# Patient Record
Sex: FEMALE | Race: WHITE | NOT HISPANIC OR LATINO | Employment: OTHER | URBAN - METROPOLITAN AREA
[De-identification: names, ages, dates, MRNs, and addresses within clinical notes are randomized per-mention and may not be internally consistent; named-entity substitution may affect disease eponyms.]

---

## 2017-06-14 ENCOUNTER — CONVERSION ENCOUNTER (OUTPATIENT)
Dept: MAMMOGRAPHY | Facility: HOSPITAL | Age: 72
End: 2017-06-14

## 2017-09-05 ENCOUNTER — CONVERSION ENCOUNTER (OUTPATIENT)
Dept: MAMMOGRAPHY | Facility: HOSPITAL | Age: 72
End: 2017-09-05

## 2018-01-10 ENCOUNTER — OFFICE VISIT CONVERTED (OUTPATIENT)
Dept: ONCOLOGY | Facility: HOSPITAL | Age: 73
End: 2018-01-10
Attending: INTERNAL MEDICINE

## 2018-03-05 ENCOUNTER — OFFICE VISIT CONVERTED (OUTPATIENT)
Dept: SURGERY | Facility: CLINIC | Age: 73
End: 2018-03-05
Attending: SURGERY

## 2018-03-16 ENCOUNTER — TELEPHONE CONVERTED (OUTPATIENT)
Dept: ONCOLOGY | Facility: HOSPITAL | Age: 73
End: 2018-03-16

## 2018-04-10 ENCOUNTER — OFFICE VISIT CONVERTED (OUTPATIENT)
Dept: ONCOLOGY | Facility: HOSPITAL | Age: 73
End: 2018-04-10
Attending: NURSE PRACTITIONER

## 2018-05-23 ENCOUNTER — OFFICE VISIT CONVERTED (OUTPATIENT)
Dept: SURGERY | Facility: CLINIC | Age: 73
End: 2018-05-23
Attending: SURGERY

## 2018-07-10 ENCOUNTER — OFFICE VISIT CONVERTED (OUTPATIENT)
Dept: ONCOLOGY | Facility: HOSPITAL | Age: 73
End: 2018-07-10
Attending: NURSE PRACTITIONER

## 2018-08-17 ENCOUNTER — OFFICE VISIT CONVERTED (OUTPATIENT)
Dept: GASTROENTEROLOGY | Facility: CLINIC | Age: 73
End: 2018-08-17
Attending: PHYSICIAN ASSISTANT

## 2018-09-07 ENCOUNTER — CONVERSION ENCOUNTER (OUTPATIENT)
Dept: OTHER | Facility: HOSPITAL | Age: 73
End: 2018-09-07

## 2018-10-22 ENCOUNTER — OFFICE VISIT CONVERTED (OUTPATIENT)
Dept: SURGERY | Facility: CLINIC | Age: 73
End: 2018-10-22
Attending: SURGERY

## 2018-10-30 ENCOUNTER — OFFICE VISIT CONVERTED (OUTPATIENT)
Dept: ONCOLOGY | Facility: HOSPITAL | Age: 73
End: 2018-10-30
Attending: INTERNAL MEDICINE

## 2018-11-28 ENCOUNTER — OFFICE VISIT CONVERTED (OUTPATIENT)
Dept: SURGERY | Facility: CLINIC | Age: 73
End: 2018-11-28
Attending: SURGERY

## 2019-01-22 ENCOUNTER — OFFICE VISIT CONVERTED (OUTPATIENT)
Dept: ONCOLOGY | Facility: HOSPITAL | Age: 74
End: 2019-01-22
Attending: INTERNAL MEDICINE

## 2019-01-22 ENCOUNTER — HOSPITAL ENCOUNTER (OUTPATIENT)
Dept: ONCOLOGY | Facility: HOSPITAL | Age: 74
Discharge: HOME | End: 2019-01-22
Attending: INTERNAL MEDICINE

## 2019-02-25 ENCOUNTER — OFFICE VISIT CONVERTED (OUTPATIENT)
Dept: ONCOLOGY | Facility: HOSPITAL | Age: 74
End: 2019-02-25
Attending: INTERNAL MEDICINE

## 2019-02-25 ENCOUNTER — HOSPITAL ENCOUNTER (OUTPATIENT)
Dept: ONCOLOGY | Facility: HOSPITAL | Age: 74
Discharge: HOME | End: 2019-02-25
Attending: INTERNAL MEDICINE

## 2019-04-04 ENCOUNTER — TRANSCRIBE ORDERS (OUTPATIENT)
Dept: ADMINISTRATIVE | Facility: HOSPITAL | Age: 74
End: 2019-04-04

## 2019-04-04 ENCOUNTER — HOSPITAL ENCOUNTER (OUTPATIENT)
Dept: CT IMAGING | Facility: HOSPITAL | Age: 74
Discharge: HOME | End: 2019-04-04
Admitting: ORTHOPAEDIC SURGERY

## 2019-04-04 DIAGNOSIS — S82.892A CLOSED LEFT ANKLE FRACTURE, INITIAL ENCOUNTER: Primary | ICD-10-CM

## 2019-04-04 DIAGNOSIS — S82.892A CLOSED LEFT ANKLE FRACTURE, INITIAL ENCOUNTER: ICD-10-CM

## 2019-04-04 PROCEDURE — 73700 CT LOWER EXTREMITY W/O DYE: CPT

## 2019-04-05 RX ORDER — LORATADINE 10 MG/1
10 CAPSULE, LIQUID FILLED ORAL DAILY
COMMUNITY

## 2019-04-05 RX ORDER — SIMVASTATIN 20 MG
20 TABLET ORAL EVERY MORNING
COMMUNITY
End: 2022-04-05

## 2019-04-05 RX ORDER — OMEPRAZOLE 20 MG/1
20 CAPSULE, DELAYED RELEASE ORAL DAILY
COMMUNITY

## 2019-04-05 RX ORDER — OXYBUTYNIN CHLORIDE 5 MG/1
5 TABLET ORAL 3 TIMES DAILY PRN
COMMUNITY

## 2019-04-05 RX ORDER — HYDROCHLOROTHIAZIDE 12.5 MG/1
12.5 TABLET ORAL DAILY
COMMUNITY

## 2019-04-05 RX ORDER — MELATONIN
1000 DAILY
COMMUNITY

## 2019-04-05 RX ORDER — METOPROLOL TARTRATE 50 MG/1
50 TABLET, FILM COATED ORAL EVERY MORNING
COMMUNITY
End: 2022-04-05

## 2019-04-05 RX ORDER — PERPHENAZINE 16 MG
1200 TABLET ORAL NIGHTLY
COMMUNITY

## 2019-04-05 RX ORDER — PHENOL 1.4 %
10 AEROSOL, SPRAY (ML) MUCOUS MEMBRANE NIGHTLY
COMMUNITY

## 2019-04-05 RX ORDER — ACETAMINOPHEN 500 MG
500 TABLET ORAL EVERY 6 HOURS PRN
COMMUNITY

## 2019-04-05 RX ORDER — ANASTROZOLE 1 MG/1
1 TABLET ORAL DAILY
COMMUNITY
End: 2022-04-05

## 2019-04-08 ENCOUNTER — APPOINTMENT (OUTPATIENT)
Dept: GENERAL RADIOLOGY | Facility: HOSPITAL | Age: 74
End: 2019-04-08

## 2019-04-08 ENCOUNTER — ANESTHESIA (OUTPATIENT)
Dept: PERIOP | Facility: HOSPITAL | Age: 74
End: 2019-04-08

## 2019-04-08 ENCOUNTER — HOSPITAL ENCOUNTER (OUTPATIENT)
Facility: HOSPITAL | Age: 74
Discharge: HOME | End: 2019-04-09
Attending: ORTHOPAEDIC SURGERY | Admitting: ORTHOPAEDIC SURGERY

## 2019-04-08 ENCOUNTER — ANESTHESIA EVENT (OUTPATIENT)
Dept: PERIOP | Facility: HOSPITAL | Age: 74
End: 2019-04-08

## 2019-04-08 DIAGNOSIS — Z74.09 IMPAIRED FUNCTIONAL MOBILITY AND ACTIVITY TOLERANCE: Primary | ICD-10-CM

## 2019-04-08 PROBLEM — S82.872A PILON FRACTURE OF LEFT TIBIA: Status: ACTIVE | Noted: 2019-04-08

## 2019-04-08 LAB
ALBUMIN SERPL-MCNC: 3.9 G/DL (ref 3.5–5.2)
ALBUMIN/GLOB SERPL: 1.3 G/DL
ALP SERPL-CCNC: 85 U/L (ref 39–117)
ALT SERPL W P-5'-P-CCNC: 15 U/L (ref 1–33)
ANION GAP SERPL CALCULATED.3IONS-SCNC: 17.1 MMOL/L
AST SERPL-CCNC: 19 U/L (ref 1–32)
BILIRUB SERPL-MCNC: 1 MG/DL (ref 0.2–1.2)
BUN BLD-MCNC: 17 MG/DL (ref 8–23)
BUN/CREAT SERPL: 15 (ref 7–25)
CALCIUM SPEC-SCNC: 10.1 MG/DL (ref 8.6–10.5)
CHLORIDE SERPL-SCNC: 93 MMOL/L (ref 98–107)
CO2 SERPL-SCNC: 24.9 MMOL/L (ref 22–29)
CREAT BLD-MCNC: 1.13 MG/DL (ref 0.57–1)
DEPRECATED RDW RBC AUTO: 43.5 FL (ref 37–54)
ERYTHROCYTE [DISTWIDTH] IN BLOOD BY AUTOMATED COUNT: 14.1 % (ref 12.3–15.4)
GFR SERPL CREATININE-BSD FRML MDRD: 47 ML/MIN/1.73
GLOBULIN UR ELPH-MCNC: 3.1 GM/DL
GLUCOSE BLD-MCNC: 95 MG/DL (ref 65–99)
HCT VFR BLD AUTO: 33.1 % (ref 34–46.6)
HGB BLD-MCNC: 11.4 G/DL (ref 12–15.9)
MCH RBC QN AUTO: 29.8 PG (ref 26.6–33)
MCHC RBC AUTO-ENTMCNC: 34.4 G/DL (ref 31.5–35.7)
MCV RBC AUTO: 86.6 FL (ref 79–97)
PLATELET # BLD AUTO: 291 10*3/MM3 (ref 140–450)
PMV BLD AUTO: 9.5 FL (ref 6–12)
POTASSIUM BLD-SCNC: 3.5 MMOL/L (ref 3.5–5.2)
PROT SERPL-MCNC: 7 G/DL (ref 6–8.5)
RBC # BLD AUTO: 3.82 10*6/MM3 (ref 3.77–5.28)
SODIUM BLD-SCNC: 135 MMOL/L (ref 136–145)
WBC NRBC COR # BLD: 8.34 10*3/MM3 (ref 3.4–10.8)

## 2019-04-08 PROCEDURE — 85027 COMPLETE CBC AUTOMATED: CPT | Performed by: ORTHOPAEDIC SURGERY

## 2019-04-08 PROCEDURE — C1713 ANCHOR/SCREW BN/BN,TIS/BN: HCPCS | Performed by: ORTHOPAEDIC SURGERY

## 2019-04-08 PROCEDURE — 63710000001 HYDROMORPHONE 2 MG TABLET: Performed by: ORTHOPAEDIC SURGERY

## 2019-04-08 PROCEDURE — 25010000002 ONDANSETRON PER 1 MG: Performed by: ANESTHESIOLOGY

## 2019-04-08 PROCEDURE — 93005 ELECTROCARDIOGRAM TRACING: CPT | Performed by: ORTHOPAEDIC SURGERY

## 2019-04-08 PROCEDURE — 80053 COMPREHEN METABOLIC PANEL: CPT | Performed by: ORTHOPAEDIC SURGERY

## 2019-04-08 PROCEDURE — 76000 FLUOROSCOPY <1 HR PHYS/QHP: CPT

## 2019-04-08 PROCEDURE — 25010000003 CEFAZOLIN IN DEXTROSE 2-4 GM/100ML-% SOLUTION: Performed by: ORTHOPAEDIC SURGERY

## 2019-04-08 PROCEDURE — 73610 X-RAY EXAM OF ANKLE: CPT

## 2019-04-08 PROCEDURE — 25010000002 PROPOFOL 10 MG/ML EMULSION: Performed by: ANESTHESIOLOGY

## 2019-04-08 PROCEDURE — 25010000003 MEPIVACAINE PER 10 ML: Performed by: ANESTHESIOLOGY

## 2019-04-08 PROCEDURE — 25010000002 MIDAZOLAM PER 1 MG: Performed by: ANESTHESIOLOGY

## 2019-04-08 PROCEDURE — 25010000002 VANCOMYCIN 1 G RECONSTITUTED SOLUTION: Performed by: ORTHOPAEDIC SURGERY

## 2019-04-08 PROCEDURE — 93010 ELECTROCARDIOGRAM REPORT: CPT | Performed by: INTERNAL MEDICINE

## 2019-04-08 PROCEDURE — 25010000002 ROPIVACAINE PER 1 MG: Performed by: ANESTHESIOLOGY

## 2019-04-08 PROCEDURE — A9270 NON-COVERED ITEM OR SERVICE: HCPCS | Performed by: ORTHOPAEDIC SURGERY

## 2019-04-08 PROCEDURE — 25010000002 FENTANYL CITRATE (PF) 100 MCG/2ML SOLUTION: Performed by: ANESTHESIOLOGY

## 2019-04-08 DEVICE — SCRW CORT S/TAP LP 3.5X28MM: Type: IMPLANTABLE DEVICE | Status: FUNCTIONAL

## 2019-04-08 DEVICE — SCRW LK ST STRDRV 2.7X14MM: Type: IMPLANTABLE DEVICE | Site: ANKLE | Status: FUNCTIONAL

## 2019-04-08 DEVICE — SCRW LK ST STRDRV 2.7X16MM: Type: IMPLANTABLE DEVICE | Site: ANKLE | Status: FUNCTIONAL

## 2019-04-08 DEVICE — SCRW CORT S/TAP 3.5X14MM: Type: IMPLANTABLE DEVICE | Site: ANKLE | Status: FUNCTIONAL

## 2019-04-08 DEVICE — SCRW LK VA/LCP S/TAP STRDRV 3.5X26MM: Type: IMPLANTABLE DEVICE | Status: FUNCTIONAL

## 2019-04-08 DEVICE — SCRW LK S/TAP T8STRDRV 2.7X38MM: Type: IMPLANTABLE DEVICE | Status: FUNCTIONAL

## 2019-04-08 DEVICE — SCRW LK S/TAP T8STRDRV 2.7X34MM: Type: IMPLANTABLE DEVICE | Status: FUNCTIONAL

## 2019-04-08 DEVICE — SCRW LK S/TAP T8STRDRV 2.7X42MM: Type: IMPLANTABLE DEVICE | Status: FUNCTIONAL

## 2019-04-08 DEVICE — SCRW CORT S/TAP 3.5X40MM: Type: IMPLANTABLE DEVICE | Site: ANKLE | Status: FUNCTIONAL

## 2019-04-08 DEVICE — IMPLANTABLE DEVICE: Type: IMPLANTABLE DEVICE | Status: FUNCTIONAL

## 2019-04-08 DEVICE — SCRW CORT S/TAP LP 3.5X26MM: Type: IMPLANTABLE DEVICE | Status: FUNCTIONAL

## 2019-04-08 DEVICE — ALLOGRFT BONE VIVIGEN CELLUAR MATRX FORMABLE 5CC: Type: IMPLANTABLE DEVICE | Status: FUNCTIONAL

## 2019-04-08 DEVICE — PLT FIB LCP L/D 5H 2.7/3.5X99LT: Type: IMPLANTABLE DEVICE | Site: ANKLE | Status: FUNCTIONAL

## 2019-04-08 DEVICE — SCRW LK S/TAP T8STRDRV 2.7X40MM: Type: IMPLANTABLE DEVICE | Status: FUNCTIONAL

## 2019-04-08 DEVICE — SCRW CORT S/TAP 3.5X42MM: Type: IMPLANTABLE DEVICE | Site: ANKLE | Status: FUNCTIONAL

## 2019-04-08 DEVICE — SCRW CORT S/TAP LP 3.5X30MM: Type: IMPLANTABLE DEVICE | Status: FUNCTIONAL

## 2019-04-08 RX ORDER — VANCOMYCIN HYDROCHLORIDE 1 G/20ML
INJECTION, POWDER, LYOPHILIZED, FOR SOLUTION INTRAVENOUS AS NEEDED
Status: DISCONTINUED | OUTPATIENT
Start: 2019-04-08 | End: 2019-04-08 | Stop reason: HOSPADM

## 2019-04-08 RX ORDER — PROPOFOL 10 MG/ML
VIAL (ML) INTRAVENOUS AS NEEDED
Status: DISCONTINUED | OUTPATIENT
Start: 2019-04-08 | End: 2019-04-08 | Stop reason: SURG

## 2019-04-08 RX ORDER — LIDOCAINE HYDROCHLORIDE 10 MG/ML
0.5 INJECTION, SOLUTION EPIDURAL; INFILTRATION; INTRACAUDAL; PERINEURAL ONCE AS NEEDED
Status: DISCONTINUED | OUTPATIENT
Start: 2019-04-08 | End: 2019-04-08 | Stop reason: HOSPADM

## 2019-04-08 RX ORDER — HYDRALAZINE HYDROCHLORIDE 20 MG/ML
5 INJECTION INTRAMUSCULAR; INTRAVENOUS
Status: DISCONTINUED | OUTPATIENT
Start: 2019-04-08 | End: 2019-04-08 | Stop reason: HOSPADM

## 2019-04-08 RX ORDER — PROMETHAZINE HYDROCHLORIDE 25 MG/1
25 SUPPOSITORY RECTAL ONCE AS NEEDED
Status: DISCONTINUED | OUTPATIENT
Start: 2019-04-08 | End: 2019-04-08 | Stop reason: HOSPADM

## 2019-04-08 RX ORDER — FAMOTIDINE 10 MG/ML
20 INJECTION, SOLUTION INTRAVENOUS ONCE
Status: COMPLETED | OUTPATIENT
Start: 2019-04-08 | End: 2019-04-08

## 2019-04-08 RX ORDER — SODIUM CHLORIDE 0.9 % (FLUSH) 0.9 %
3 SYRINGE (ML) INJECTION EVERY 12 HOURS SCHEDULED
Status: DISCONTINUED | OUTPATIENT
Start: 2019-04-08 | End: 2019-04-09 | Stop reason: HOSPADM

## 2019-04-08 RX ORDER — HYDROMORPHONE HYDROCHLORIDE 1 MG/ML
1 INJECTION, SOLUTION INTRAMUSCULAR; INTRAVENOUS; SUBCUTANEOUS EVERY 4 HOURS PRN
Status: DISCONTINUED | OUTPATIENT
Start: 2019-04-08 | End: 2019-04-09 | Stop reason: HOSPADM

## 2019-04-08 RX ORDER — PROMETHAZINE HYDROCHLORIDE 25 MG/ML
12.5 INJECTION, SOLUTION INTRAMUSCULAR; INTRAVENOUS ONCE AS NEEDED
Status: DISCONTINUED | OUTPATIENT
Start: 2019-04-08 | End: 2019-04-08 | Stop reason: HOSPADM

## 2019-04-08 RX ORDER — EPHEDRINE SULFATE 50 MG/ML
5 INJECTION, SOLUTION INTRAVENOUS ONCE AS NEEDED
Status: DISCONTINUED | OUTPATIENT
Start: 2019-04-08 | End: 2019-04-08 | Stop reason: HOSPADM

## 2019-04-08 RX ORDER — HYDROMORPHONE HYDROCHLORIDE 2 MG/1
4 TABLET ORAL EVERY 4 HOURS PRN
Status: DISCONTINUED | OUTPATIENT
Start: 2019-04-08 | End: 2019-04-09 | Stop reason: HOSPADM

## 2019-04-08 RX ORDER — SODIUM CHLORIDE, SODIUM LACTATE, POTASSIUM CHLORIDE, CALCIUM CHLORIDE 600; 310; 30; 20 MG/100ML; MG/100ML; MG/100ML; MG/100ML
9 INJECTION, SOLUTION INTRAVENOUS CONTINUOUS
Status: DISCONTINUED | OUTPATIENT
Start: 2019-04-08 | End: 2019-04-09 | Stop reason: HOSPADM

## 2019-04-08 RX ORDER — DIPHENHYDRAMINE HYDROCHLORIDE 50 MG/ML
12.5 INJECTION INTRAMUSCULAR; INTRAVENOUS
Status: DISCONTINUED | OUTPATIENT
Start: 2019-04-08 | End: 2019-04-08 | Stop reason: HOSPADM

## 2019-04-08 RX ORDER — PROMETHAZINE HYDROCHLORIDE 25 MG/1
25 TABLET ORAL ONCE AS NEEDED
Status: DISCONTINUED | OUTPATIENT
Start: 2019-04-08 | End: 2019-04-08 | Stop reason: HOSPADM

## 2019-04-08 RX ORDER — SENNA AND DOCUSATE SODIUM 50; 8.6 MG/1; MG/1
2 TABLET, FILM COATED ORAL 2 TIMES DAILY PRN
Status: DISCONTINUED | OUTPATIENT
Start: 2019-04-08 | End: 2019-04-09 | Stop reason: HOSPADM

## 2019-04-08 RX ORDER — ANASTROZOLE 1 MG/1
1 TABLET ORAL DAILY
Status: DISCONTINUED | OUTPATIENT
Start: 2019-04-09 | End: 2019-04-09 | Stop reason: HOSPADM

## 2019-04-08 RX ORDER — LIDOCAINE HYDROCHLORIDE 20 MG/ML
INJECTION, SOLUTION INFILTRATION; PERINEURAL AS NEEDED
Status: DISCONTINUED | OUTPATIENT
Start: 2019-04-08 | End: 2019-04-08 | Stop reason: SURG

## 2019-04-08 RX ORDER — MIDAZOLAM HYDROCHLORIDE 1 MG/ML
2 INJECTION INTRAMUSCULAR; INTRAVENOUS
Status: DISCONTINUED | OUTPATIENT
Start: 2019-04-08 | End: 2019-04-08 | Stop reason: HOSPADM

## 2019-04-08 RX ORDER — SODIUM CHLORIDE 0.9 % (FLUSH) 0.9 %
3-10 SYRINGE (ML) INJECTION AS NEEDED
Status: DISCONTINUED | OUTPATIENT
Start: 2019-04-08 | End: 2019-04-09 | Stop reason: HOSPADM

## 2019-04-08 RX ORDER — HYDROCHLOROTHIAZIDE 25 MG/1
12.5 TABLET ORAL DAILY
Status: DISCONTINUED | OUTPATIENT
Start: 2019-04-09 | End: 2019-04-09 | Stop reason: HOSPADM

## 2019-04-08 RX ORDER — ROPIVACAINE HYDROCHLORIDE 5 MG/ML
INJECTION, SOLUTION EPIDURAL; INFILTRATION; PERINEURAL
Status: COMPLETED | OUTPATIENT
Start: 2019-04-08 | End: 2019-04-08

## 2019-04-08 RX ORDER — FENTANYL CITRATE 50 UG/ML
50 INJECTION, SOLUTION INTRAMUSCULAR; INTRAVENOUS
Status: DISCONTINUED | OUTPATIENT
Start: 2019-04-08 | End: 2019-04-08 | Stop reason: HOSPADM

## 2019-04-08 RX ORDER — NALOXONE HCL 0.4 MG/ML
0.2 VIAL (ML) INJECTION AS NEEDED
Status: DISCONTINUED | OUTPATIENT
Start: 2019-04-08 | End: 2019-04-08 | Stop reason: HOSPADM

## 2019-04-08 RX ORDER — OXYBUTYNIN CHLORIDE 5 MG/1
5 TABLET ORAL 3 TIMES DAILY PRN
Status: DISCONTINUED | OUTPATIENT
Start: 2019-04-08 | End: 2019-04-09 | Stop reason: HOSPADM

## 2019-04-08 RX ORDER — FLUMAZENIL 0.1 MG/ML
0.2 INJECTION INTRAVENOUS AS NEEDED
Status: DISCONTINUED | OUTPATIENT
Start: 2019-04-08 | End: 2019-04-08 | Stop reason: HOSPADM

## 2019-04-08 RX ORDER — ROPIVACAINE HYDROCHLORIDE 2 MG/ML
INJECTION, SOLUTION EPIDURAL; INFILTRATION; PERINEURAL
Status: COMPLETED | OUTPATIENT
Start: 2019-04-08 | End: 2019-04-08

## 2019-04-08 RX ORDER — ONDANSETRON 2 MG/ML
4 INJECTION INTRAMUSCULAR; INTRAVENOUS ONCE AS NEEDED
Status: COMPLETED | OUTPATIENT
Start: 2019-04-08 | End: 2019-04-08

## 2019-04-08 RX ORDER — ONDANSETRON 2 MG/ML
INJECTION INTRAMUSCULAR; INTRAVENOUS AS NEEDED
Status: DISCONTINUED | OUTPATIENT
Start: 2019-04-08 | End: 2019-04-08 | Stop reason: SURG

## 2019-04-08 RX ORDER — HYDROCODONE BITARTRATE AND ACETAMINOPHEN 7.5; 325 MG/1; MG/1
1 TABLET ORAL ONCE AS NEEDED
Status: DISCONTINUED | OUTPATIENT
Start: 2019-04-08 | End: 2019-04-08 | Stop reason: HOSPADM

## 2019-04-08 RX ORDER — CEFAZOLIN SODIUM 2 G/100ML
2 INJECTION, SOLUTION INTRAVENOUS ONCE
Status: DISCONTINUED | OUTPATIENT
Start: 2019-04-08 | End: 2019-04-08 | Stop reason: HOSPADM

## 2019-04-08 RX ORDER — CETIRIZINE HYDROCHLORIDE 10 MG/1
10 TABLET ORAL DAILY
Status: DISCONTINUED | OUTPATIENT
Start: 2019-04-09 | End: 2019-04-09 | Stop reason: HOSPADM

## 2019-04-08 RX ORDER — DIPHENHYDRAMINE HCL 25 MG
25 CAPSULE ORAL
Status: DISCONTINUED | OUTPATIENT
Start: 2019-04-08 | End: 2019-04-08 | Stop reason: HOSPADM

## 2019-04-08 RX ORDER — HYDROMORPHONE HYDROCHLORIDE 1 MG/ML
0.5 INJECTION, SOLUTION INTRAMUSCULAR; INTRAVENOUS; SUBCUTANEOUS
Status: DISCONTINUED | OUTPATIENT
Start: 2019-04-08 | End: 2019-04-08 | Stop reason: HOSPADM

## 2019-04-08 RX ORDER — MIDAZOLAM HYDROCHLORIDE 1 MG/ML
1 INJECTION INTRAMUSCULAR; INTRAVENOUS
Status: DISCONTINUED | OUTPATIENT
Start: 2019-04-08 | End: 2019-04-08 | Stop reason: HOSPADM

## 2019-04-08 RX ORDER — CEFAZOLIN SODIUM 2 G/100ML
2 INJECTION, SOLUTION INTRAVENOUS EVERY 8 HOURS
Status: DISCONTINUED | OUTPATIENT
Start: 2019-04-08 | End: 2019-04-09 | Stop reason: HOSPADM

## 2019-04-08 RX ORDER — HYDROMORPHONE HYDROCHLORIDE 2 MG/1
2 TABLET ORAL EVERY 4 HOURS PRN
COMMUNITY
End: 2019-04-09 | Stop reason: HOSPADM

## 2019-04-08 RX ORDER — CHOLECALCIFEROL (VITAMIN D3) 125 MCG
10 CAPSULE ORAL NIGHTLY
Status: DISCONTINUED | OUTPATIENT
Start: 2019-04-08 | End: 2019-04-09 | Stop reason: HOSPADM

## 2019-04-08 RX ORDER — LABETALOL HYDROCHLORIDE 5 MG/ML
5 INJECTION, SOLUTION INTRAVENOUS
Status: DISCONTINUED | OUTPATIENT
Start: 2019-04-08 | End: 2019-04-08 | Stop reason: HOSPADM

## 2019-04-08 RX ORDER — SODIUM CHLORIDE 0.9 % (FLUSH) 0.9 %
1-10 SYRINGE (ML) INJECTION AS NEEDED
Status: DISCONTINUED | OUTPATIENT
Start: 2019-04-08 | End: 2019-04-08 | Stop reason: HOSPADM

## 2019-04-08 RX ORDER — METOPROLOL TARTRATE 50 MG/1
50 TABLET, FILM COATED ORAL EVERY MORNING
Status: DISCONTINUED | OUTPATIENT
Start: 2019-04-09 | End: 2019-04-09 | Stop reason: HOSPADM

## 2019-04-08 RX ORDER — HYDROMORPHONE HYDROCHLORIDE 2 MG/1
2 TABLET ORAL EVERY 4 HOURS PRN
Status: DISCONTINUED | OUTPATIENT
Start: 2019-04-08 | End: 2019-04-09 | Stop reason: HOSPADM

## 2019-04-08 RX ORDER — ACETAMINOPHEN 325 MG/1
650 TABLET ORAL ONCE AS NEEDED
Status: DISCONTINUED | OUTPATIENT
Start: 2019-04-08 | End: 2019-04-08 | Stop reason: HOSPADM

## 2019-04-08 RX ORDER — LOPERAMIDE HYDROCHLORIDE 2 MG/1
4 CAPSULE ORAL 4 TIMES DAILY PRN
Status: DISCONTINUED | OUTPATIENT
Start: 2019-04-08 | End: 2019-04-09 | Stop reason: HOSPADM

## 2019-04-08 RX ORDER — MAGNESIUM HYDROXIDE 1200 MG/15ML
LIQUID ORAL AS NEEDED
Status: DISCONTINUED | OUTPATIENT
Start: 2019-04-08 | End: 2019-04-08 | Stop reason: HOSPADM

## 2019-04-08 RX ORDER — OXYCODONE AND ACETAMINOPHEN 7.5; 325 MG/1; MG/1
1 TABLET ORAL ONCE AS NEEDED
Status: DISCONTINUED | OUTPATIENT
Start: 2019-04-08 | End: 2019-04-08 | Stop reason: HOSPADM

## 2019-04-08 RX ORDER — ONDANSETRON 2 MG/ML
4 INJECTION INTRAMUSCULAR; INTRAVENOUS EVERY 6 HOURS PRN
Status: DISCONTINUED | OUTPATIENT
Start: 2019-04-08 | End: 2019-04-09 | Stop reason: HOSPADM

## 2019-04-08 RX ORDER — NALOXONE HCL 0.4 MG/ML
0.4 VIAL (ML) INJECTION
Status: DISCONTINUED | OUTPATIENT
Start: 2019-04-08 | End: 2019-04-09 | Stop reason: HOSPADM

## 2019-04-08 RX ORDER — PANTOPRAZOLE SODIUM 40 MG/1
40 TABLET, DELAYED RELEASE ORAL EVERY MORNING
Status: DISCONTINUED | OUTPATIENT
Start: 2019-04-09 | End: 2019-04-09 | Stop reason: HOSPADM

## 2019-04-08 RX ADMIN — LIDOCAINE HYDROCHLORIDE 60 MG: 20 INJECTION, SOLUTION INFILTRATION; PERINEURAL at 16:02

## 2019-04-08 RX ADMIN — FENTANYL CITRATE 100 MCG: 50 INJECTION, SOLUTION INTRAMUSCULAR; INTRAVENOUS at 15:11

## 2019-04-08 RX ADMIN — MIDAZOLAM 2 MG: 1 INJECTION INTRAMUSCULAR; INTRAVENOUS at 15:10

## 2019-04-08 RX ADMIN — HYDROMORPHONE HYDROCHLORIDE 2 MG: 2 TABLET ORAL at 23:26

## 2019-04-08 RX ADMIN — ONDANSETRON 4 MG: 2 INJECTION INTRAMUSCULAR; INTRAVENOUS at 18:11

## 2019-04-08 RX ADMIN — SODIUM CHLORIDE, PRESERVATIVE FREE 3 ML: 5 INJECTION INTRAVENOUS at 23:21

## 2019-04-08 RX ADMIN — CEFAZOLIN SODIUM 2 G: 2 INJECTION, SOLUTION INTRAVENOUS at 23:21

## 2019-04-08 RX ADMIN — FAMOTIDINE 20 MG: 10 INJECTION INTRAVENOUS at 14:08

## 2019-04-08 RX ADMIN — MEPIVACAINE HYDROCHLORIDE 10 ML: 15 INJECTION, SOLUTION EPIDURAL; INFILTRATION at 15:10

## 2019-04-08 RX ADMIN — PROPOFOL 200 MG: 10 INJECTION, EMULSION INTRAVENOUS at 16:02

## 2019-04-08 RX ADMIN — ROPIVACAINE HYDROCHLORIDE 20 ML: 2 INJECTION, SOLUTION EPIDURAL; INFILTRATION at 15:10

## 2019-04-08 RX ADMIN — SODIUM CHLORIDE, POTASSIUM CHLORIDE, SODIUM LACTATE AND CALCIUM CHLORIDE 9 ML/HR: 600; 310; 30; 20 INJECTION, SOLUTION INTRAVENOUS at 14:09

## 2019-04-08 RX ADMIN — MIDAZOLAM 1 MG: 1 INJECTION INTRAMUSCULAR; INTRAVENOUS at 14:08

## 2019-04-08 RX ADMIN — ONDANSETRON 4 MG: 2 INJECTION INTRAMUSCULAR; INTRAVENOUS at 20:10

## 2019-04-08 RX ADMIN — ROPIVACAINE HYDROCHLORIDE 25 ML: 5 INJECTION, SOLUTION EPIDURAL; INFILTRATION; PERINEURAL at 15:10

## 2019-04-08 NOTE — ANESTHESIA PROCEDURE NOTES
Peripheral Block    Pre-sedation assessment completed: 4/8/2019 3:10 PM    Patient reassessed immediately prior to procedure    Patient location during procedure: holding area  Start time: 4/8/2019 3:10 PM  Stop time: 4/8/2019 3:25 PM  Reason for block: at surgeon's request and post-op pain management  Performed by  Anesthesiologist: Faustino Curran MD  Preanesthetic Checklist  Completed: patient identified, site marked, surgical consent, pre-op evaluation, timeout performed, IV checked, risks and benefits discussed and monitors and equipment checked  Prep:  Sterile barriers:cap, gloves, gown, mask and sterile barriers  Prep: ChloraPrep  Patient monitoring: blood pressure monitoring, continuous pulse oximetry and EKG  Procedure  Sedation:yes    Guidance:ultrasound guided  ULTRASOUND INTERPRETATION.  Using ultrasound guidance a 21 G gauge needle was placed in close proximity to the femoral and sciatic nerve, at which point, under ultrasound guidance anesthetic was injected in the area of the nerve and spread of the anesthesia was seen on ultrasound in close proximity thereto.  There were no abnormalities seen on ultrasound; a digital image was taken; and the patient tolerated the procedure with no complications. Images:still images obtained    Laterality:left  Block Type:femoral and popliteal  Injection Technique:single-shot  Needle Type:echogenic  Needle Gauge:21 G    Medications Used: ropivacaine (NAROPIN) 0.5 % injection, 25 mL  ropivacaine (NAROPIN) 0.2 % injection, 20 mL  mepivacaine (CARBOCAINE) 1.5 % injection, 10 mL  Post Assessment  Injection Assessment: negative aspiration for heme, no paresthesia on injection and incremental injection  Patient Tolerance:comfortable throughout block  Complications:no

## 2019-04-08 NOTE — ANESTHESIA PROCEDURE NOTES
Airway  Urgency: elective    Airway not difficult    General Information and Staff    Patient location during procedure: OR  Anesthesiologist: Sean Villeda MD    Indications and Patient Condition  Indications for airway management: airway protection    Preoxygenated: yes  Mask difficulty assessment: 1 - vent by mask    Final Airway Details  Final airway type: supraglottic airway      Successful airway: classic  Size 4    Number of attempts at approach: 1

## 2019-04-08 NOTE — ANESTHESIA PREPROCEDURE EVALUATION
Anesthesia Evaluation     Patient summary reviewed and Nursing notes reviewed   NPO Solid Status: > 8 hours  NPO Liquid Status: > 2 hours           Airway   Mallampati: II  TM distance: >3 FB  Neck ROM: full  no difficulty expected  Dental - normal exam     Pulmonary - normal exam   (+) shortness of breath,   Cardiovascular - normal exam    (+) hypertension, hyperlipidemia,       Neuro/Psych  GI/Hepatic/Renal/Endo      Musculoskeletal     Abdominal  - normal exam   Substance History      OB/GYN          Other                        Anesthesia Plan    ASA 3     general and regional   (Popliteal Femerol)  intravenous induction   Anesthetic plan, all risks, benefits, and alternatives have been provided, discussed and informed consent has been obtained with: patient.

## 2019-04-08 NOTE — OP NOTE
Procedure Note    Cindy Rosa  4/8/2019    Pre-op Diagnosis:   1.  Closed displaced left distal tibia pilon fracture, subacute  2.  Closed displaced left fibular fracture, subacute       Post-Op Diagnosis Codes:  Same    Procedure:  1.  Open reduction internal fixation, left distal tibia pilon fracture  2.  Open reduction internal fixation, left distal fibular fracture  3.  Open reduction internal fixation, left ankle syndesmosis    Surgeon(s):  Skinny Dupree Jr., MD    Anesthesia: Regional plus LMA    Estimated Blood Loss: minimal    Specimens:                None       Drains:    None    Complications:   None apparent    Disposition:  Stable to PACU for recovery    Note:  This case was significantly more technically difficult than a standard distal tibia pilon fracture and distal fibular fracture.  Each fracture was over 1 month old in a neuropathic patient with poor neuropathic bone quality.  Both fractures had severe deformity and required an osteotomy to recreate the fracture lines, augmented fixation techniques, bone grafting, and salvage reduction techniques; fixation of each fracture required twice as much operative time, respectively, compared to the time it routinely takes to treat acute distal tibia and acute distal fibular fractures.    Indications for procedure:  The patient is a pleasant 73-year-old female with severe peripheral neuropathy who presented with closed displaced distal tibia and fibular fractures.   At the time of presentation, these fractures were over one month old.  Operative stabilization of her unstable displaced fractures were recommended. The risks/benefits of surgery, including pain, infection, wound healing problems, need for future procedures, mal/nonunion, DVT/PE, cardiac event, and/or death were discussed, and the patient elected to proceed with surgery.    Procedure in detail:  The correct patient was identified in preoperative holding.  All risks and benefits of surgery  were again discussed in detail, and the patient agreed to proceed with surgery.  The operative extremity was confirmed and marked by myself.  Operative consent reviewed and confirmed to be signed by the patient and myself.    At this time, the patient was wheeled to the operative theatre and placed supine on the OR table.  PEG/SCD placed on nonoperative leg. Anesthesia was induced smoothly by our anesthesia colleagues.   Well padded nonsterile tourniquet placed on the upper thigh. The left lower extremity was prepped and draped in standard sterile fashion.  Appropriate presurgical timeout was performed, confirming correct patient, correct extremity, correct procedure, availability of sterile instruments/implants, and the administration of intravenous antibiotics in the form of Ancef within one hour of skin incision.    At this time, the extremity exsanguinated with an Esmarch and the tourniquet insufflated to 250mmHg.    A longitudinal incision was made over the anterior ankle joint.  Dissection carried down to the extensor retinaculum, taking care to avoid and protect any branches of the superficial peroneal nerve.  The extensor retinaculum was opened along the lateral border of the tibialis anterior tendon.  The neurovascular bundle under EHL was identified and protected/retracted for the remainder of the case.  Longitudinal arthrotomy was made over the ankle joint.  Subperiosteal dissection performed exposing the distal tibia.    The metaphyseal fracture line was identified.  Callus was sharply removed using a rongeur and a knife.  There was significant early bone formation at the fracture site.  Using fluoroscopy, an osteotome was used to carefully create an osteotomy, re-creating the fracture line.  Once the fracture line was re-established, I carefully used a ball spike to reduce the impacted valgus malaligment.  This was provisionally pinned in place with 2.0 K wires.     The bone quality was noted to be  exceedingly poor. Given the subacute nature of the fracture, there was some bone loss laterally as the fracture had partially healed in an impacted position.   Depuy Synthes Vivigen cellular allograft was carefully packed in the lateral metadiaphyseal defect.    The multiple fracture lines extending to the plafond were carefully reduced and provisionally pinned.  Anatomic restoration of the plafond/distal tibia articular surface was confirmed by direct inspection and fluoroscopy.      A Synthes 8 hole anterolateral distal tibia plate was selected and provisionally pinned in place.  Ideal placement confirmed on fluoroscopy.  This was then secured distally using 2.7mm locking screws and proximally using a combination of 3.5mm nonlocking cortical screws and locking screws.  The proximal two screws were placed through a small stab incision over the anterolateral leg using a perfect circles technique.    At this time, fluoroscopy confirmed anatomic reduction of the distal tibia mechanical axis as well as the articular surface of the plafond. The wounds were irrigated and closed with 00 Vicryl in the capsule/retinacular layer, 000 Vicryl in the subcutaneous layer, and 000 nylon and staples on the skin.    The distal fibula is palpated at the lateral ankle and a longitudinal incision is made centered over it with a #15 blade.  Careful subcutaneous dissection is carried down to the fibula, taking great care to look for and protect any branches of the superficial peroneal nerve.  A #15 blade is then used to gain appropriate subperiosteal dissection at the identified distal fibula fracture. There was significant callus formation/early bone healing.  The bone quality was again noted to be exceedingly poor.  I used a knife and a rongeur to remove early bone healing/callus.  An osteotome was used to carefully perform an osteotomy, carefully recreating the fracture line.    The fracture remained comminuted and very short.  I  could not gain appropriate length using standard reduction techniques/small reduction clamps.      An appropriately-sized Synthes distal fibular locking plate is then fashioned on the lateral portion of the distal fibula in appropriate position and provisionally held in place with two olive wires.   The plate was secured to the distal fibula using 2.7mm locking screws in unicortical fashion.  I then placed a 3.5mm cortical screw proximal to the proximal edge of the plate in the fibular shaft.  I then used a lamina  against the screw and the proximal aspect of the plate to carefully distract at the fracture site until fibular length was restored. Given the bone quality and distraction technique no lag screw was possible.  Vivigen allograft was placed in the fracture site.  The plate was then secured proximally to the fibula using 3.5mm cortical screws in bicortical fashion.  The screw in the fibular shaft that had been used for the distraction technique was removed.      At this time, direct inspection and manual stress tests (Cotton test on mortise/lateral views and ER stress test) were used along with intraoperative fluoroscopy to assess the stability of the syndesmosis and deltoid ligament.  Syndesmotic instability was evident radiographically, but also through direct inspection anterolaterally at the ankle.  The syndesmosis is reduced, with fibular position assessed under direct visualization, as well as by intraoperative fluoroscopy.  The ankle and distal syndesmosis remained well-reduced.  Using fluoroscopy as a guide, tetracortical drill holes for two 3.5 mm cortical screws were made in appropriate position and screws placed.      Final fluoroscopic AP, mortise, and lateral views of the distal tibia and ankle confirmed excellent reduction of the distal tibia, ankle mortise and syndesmosis.  All hardware was in appropriate position and of appropriate length.  Stress fluoroscopy confirms excellent  stability to the ankle.    The lateral wound was then closed in meticulous, layered fashion with 00 Vicryl, 000 Vicryl, and finally staples on the skin.  The skin was cleaned and dried and a sterile dressing applied, followed by a short leg splint (posterior slab and stirrup) with the ankle in neutral position.  The tourniquet was released and brisk capillary refill returns to all toes.                    Skinny Dupree Jr, MD     Date: 4/8/2019  Time: 7:33 PM

## 2019-04-09 VITALS
DIASTOLIC BLOOD PRESSURE: 52 MMHG | HEART RATE: 67 BPM | TEMPERATURE: 100.4 F | WEIGHT: 221.5 LBS | BODY MASS INDEX: 35.6 KG/M2 | OXYGEN SATURATION: 95 % | HEIGHT: 66 IN | SYSTOLIC BLOOD PRESSURE: 111 MMHG | RESPIRATION RATE: 20 BRPM

## 2019-04-09 PROCEDURE — A9270 NON-COVERED ITEM OR SERVICE: HCPCS | Performed by: ORTHOPAEDIC SURGERY

## 2019-04-09 PROCEDURE — 25010000002 ENOXAPARIN PER 10 MG: Performed by: ORTHOPAEDIC SURGERY

## 2019-04-09 PROCEDURE — 97110 THERAPEUTIC EXERCISES: CPT

## 2019-04-09 PROCEDURE — 63710000001 PANTOPRAZOLE 40 MG TABLET DELAYED-RELEASE: Performed by: ORTHOPAEDIC SURGERY

## 2019-04-09 PROCEDURE — 63710000001 HYDROMORPHONE 2 MG TABLET: Performed by: ORTHOPAEDIC SURGERY

## 2019-04-09 PROCEDURE — 25010000003 CEFAZOLIN IN DEXTROSE 2-4 GM/100ML-% SOLUTION: Performed by: ORTHOPAEDIC SURGERY

## 2019-04-09 PROCEDURE — 97162 PT EVAL MOD COMPLEX 30 MIN: CPT

## 2019-04-09 PROCEDURE — 63710000001 METOPROLOL TARTRATE 50 MG TABLET: Performed by: ORTHOPAEDIC SURGERY

## 2019-04-09 PROCEDURE — 63710000001 ANASTROZOLE 1 MG TABLET: Performed by: ORTHOPAEDIC SURGERY

## 2019-04-09 PROCEDURE — 63710000001 CETIRIZINE 10 MG TABLET: Performed by: ORTHOPAEDIC SURGERY

## 2019-04-09 PROCEDURE — 63710000001 HYDROCHLOROTHIAZIDE 25 MG TABLET: Performed by: ORTHOPAEDIC SURGERY

## 2019-04-09 RX ORDER — HYDROMORPHONE HYDROCHLORIDE 2 MG/1
2 TABLET ORAL EVERY 4 HOURS PRN
Qty: 60 TABLET | Refills: 0 | Status: SHIPPED | OUTPATIENT
Start: 2019-04-09 | End: 2020-04-08

## 2019-04-09 RX ADMIN — CEFAZOLIN SODIUM 2 G: 2 INJECTION, SOLUTION INTRAVENOUS at 06:16

## 2019-04-09 RX ADMIN — HYDROMORPHONE HYDROCHLORIDE 2 MG: 2 TABLET ORAL at 07:41

## 2019-04-09 RX ADMIN — HYDROMORPHONE HYDROCHLORIDE 2 MG: 2 TABLET ORAL at 03:39

## 2019-04-09 RX ADMIN — HYDROMORPHONE HYDROCHLORIDE 2 MG: 2 TABLET ORAL at 11:35

## 2019-04-09 RX ADMIN — HYDROCHLOROTHIAZIDE 12.5 MG: 25 TABLET ORAL at 11:35

## 2019-04-09 RX ADMIN — ANASTROZOLE 1 MG: 1 TABLET ORAL at 11:36

## 2019-04-09 RX ADMIN — SODIUM CHLORIDE, PRESERVATIVE FREE 3 ML: 5 INJECTION INTRAVENOUS at 11:36

## 2019-04-09 RX ADMIN — ENOXAPARIN SODIUM 40 MG: 40 INJECTION SUBCUTANEOUS at 11:36

## 2019-04-09 RX ADMIN — CETIRIZINE HYDROCHLORIDE 10 MG: 10 TABLET, FILM COATED ORAL at 11:36

## 2019-04-09 RX ADMIN — PANTOPRAZOLE SODIUM 40 MG: 40 TABLET, DELAYED RELEASE ORAL at 06:16

## 2019-04-09 NOTE — PROGRESS NOTES
"Orthopaedic Foot and Ankle Surgery  Daily Progress Note    /52 (BP Location: Right arm, Patient Position: Lying)   Pulse 67   Temp 100.4 °F (38 °C) (Oral)   Resp 20   Ht 166.4 cm (65.5\")   Wt 100 kg (221 lb 8 oz)   SpO2 95%   BMI 36.30 kg/m²       Imaging Results (last 24 hours)     Procedure Component Value Units Date/Time    XR Ankle 3+ View Left [[id number]] Collected:  04/08/19 1926     Updated:  04/08/19 1943    Narrative:       XR ANKLE 3+ VW LEFT-, FL C ARM DURING SURGERY-     INDICATIONS: ORIF left ankle     TECHNIQUE: FLUOROSCOPIC ASSISTANCE IN THE OPERATING ROOM.     FINDINGS:     8 intraoperative fluoroscopic spot views were obtained and recorded the  PACS for review, revealing plate and screws surgical hardware  transfixing the left distal tibia and fibula fractures.. Please see  operative report for full details.     Fluoroscopy time: 6 minutes, 11 seconds       Impression:          As described.     This report was finalized on 4/8/2019 7:27 PM by Dr. Carl Reid M.D.       FL C Arm During Surgery [[id number]] Collected:  04/08/19 1926     Updated:  04/08/19 1943    Narrative:       XR ANKLE 3+ VW LEFT-, FL C ARM DURING SURGERY-     INDICATIONS: ORIF left ankle     TECHNIQUE: FLUOROSCOPIC ASSISTANCE IN THE OPERATING ROOM.     FINDINGS:     8 intraoperative fluoroscopic spot views were obtained and recorded the  PACS for review, revealing plate and screws surgical hardware  transfixing the left distal tibia and fibula fractures.. Please see  operative report for full details.     Fluoroscopy time: 6 minutes, 11 seconds       Impression:          As described.     This report was finalized on 4/8/2019 7:27 PM by Dr. Carl Reid M.D.             Patient Care Team:  Gina Roy APRN as PCP - General (Family Medicine)    SUBJECTIVE  Pain controlled    PHYSICAL EXAM  Resting in NAD  Splint clean, dry, intact  Toes warm, perfused, brisk capillary refill  Minimal motor/sensory " 2/2 block  No pain with passive stretch       Pilon fracture of left tibia      PLAN / DISPOSITION:  POD 1 s/o ORIF left pilon/fibula, doing well  1. Pain control: Orals  2.  Antibiotics: Periop to complete today  3.  PT: NWB LLE  4. DVT: Lovenox 40mg daily plus mobilization  5. Dispo: home today, follow up 2-3 weeks with me at [facility] ([phone] to make appointment)    Skinny Dupree Jr, MD  04/09/19  7:37 AM

## 2019-04-09 NOTE — PLAN OF CARE
Problem: Patient Care Overview  Goal: Plan of Care Review   04/09/19 0946   Coping/Psychosocial   Plan of Care Reviewed With patient   OTHER   Outcome Summary pt presents with impaired functional mobility with NWB status after ORIF L LE, she was able to transfer bed to BSC. bed to chair using a squat pivot technique and maintain NWB status, she shows good judgement and understanding of NWB status, pt to go home today, has walker, needs BSC, pt going to rent a rolling knee scooter, and she is borrowing a w/c. plans are for pt to go home today

## 2019-04-09 NOTE — ANESTHESIA POSTPROCEDURE EVALUATION
"Patient: Cindy Rosa    Procedure Summary     Date:  04/08/19 Room / Location:  [facility] OR 06 / [facility] MAIN OR    Anesthesia Start:  1557 Anesthesia Stop:  1931    Procedure:  LEFT PILON AND FIBULA FRACTURE OPEN REDUCTION INTERNAL FIXATION (Left Ankle) Diagnosis:      Surgeon:  Skinny Dupree Jr., MD Provider:  Cyndee Mclean MD    Anesthesia Type:  general, regional ASA Status:  3          Anesthesia Type: general, regional  Last vitals  BP   174/70 (04/08/19 2005)   Temp   36.4 °C (97.6 °F) (04/08/19 1926)   Pulse   57 (04/08/19 2005)   Resp   18 (04/08/19 2005)     SpO2   98 % (04/08/19 2005)     Post Anesthesia Care and Evaluation    Patient location during evaluation: bedside  Patient participation: complete - patient participated  Level of consciousness: awake  Pain management: adequate  Airway patency: patent  Anesthetic complications: No anesthetic complications    Cardiovascular status: acceptable  Respiratory status: acceptable  Hydration status: acceptable    Comments: */70   Pulse 57   Temp 36.4 °C (97.6 °F) (Oral)   Resp 18   Ht 166.4 cm (65.5\")   Wt 100 kg (221 lb 8 oz)   SpO2 98%   BMI 36.30 kg/m²         "

## 2019-04-09 NOTE — DISCHARGE PLACEMENT REQUEST
"Cindy Rosa (73 y.o. Female)     Date of Birth Social Security Number Address [address] Phone MRN    1945  [id number] [address] [phone] [id number]    [place] Marital Status          Faith        Admission Date Admission Type Admitting Provider Attending Provider Department, Room/Bed    4/8/19 Elective Skinny Dupree Jr., MD Lewis, John S Jr., MD [address], [address]    Discharge Date Discharge Disposition Discharge Destination         Home or Self Care              Attending Provider:  Skinny Dupree Jr., MD    Allergies:  No Known Allergies    Isolation:  None   Infection:  None   Code Status:  CPR    Ht:  166.4 cm (65.5\")   Wt:  100 kg (221 lb 8 oz)    Admission Cmt:  None   Principal Problem:  None                Active Insurance as of 4/8/2019     Primary Coverage     Payor Plan Insurance Group Employer/Plan Group    MEDICARE MEDICARE A & B      Payor Plan Address Payor Plan Phone Number Payor Plan Fax Number Effective Dates    [address] [phone]  9/1/2012 - None Entered    [facility] [address]       Subscriber Name Subscriber Birth Date Member ID       CINDY ROSA 1945 [id number]           Secondary Coverage     Payor Plan Insurance Group Employer/Plan Group    AARP MED SUPP AARP HEALTH CARE OPTIONS      Payor Plan Address Payor Plan Phone Number Payor Plan Fax Number Effective Dates    [facility] [phone]  1/1/2019 - None Entered    [address]       [facility] [address]       Subscriber Name Subscriber Birth Date Member ID       CINDY ROSA 1945 [id number]                 Emergency Contacts      (Rel.) Home Phone Work Phone Mobile Phone    STEVE CALERO (Daughter) -- -- [phone]        "

## 2019-04-09 NOTE — PLAN OF CARE
Problem: Patient Care Overview  Goal: Plan of Care Review  Outcome: Ongoing (interventions implemented as appropriate)   04/09/19 0321   Coping/Psychosocial   Plan of Care Reviewed With patient   Plan of Care Review   Progress improving   OTHER   Outcome Summary Came in from PACU, post ORIF of left pilon and fibula fracture. W/ low grade fever, CPAP with O2 2L on going,  pain well controlled with tab Dilaudid, up with assist to BSC, Non weight bearing on  LLE maintained. Regular diet tolerated. Voided adequately. BM small in amount. Slept on and off.       Goal: Individualization and Mutuality  Outcome: Ongoing (interventions implemented as appropriate)    Goal: Discharge Needs Assessment  Outcome: Ongoing (interventions implemented as appropriate)    Goal: Interprofessional Rounds/Family Conf  Outcome: Ongoing (interventions implemented as appropriate)      Problem: Fall Risk (Adult)  Goal: Identify Related Risk Factors and Signs and Symptoms  Outcome: Ongoing (interventions implemented as appropriate)    Goal: Absence of Fall  Outcome: Ongoing (interventions implemented as appropriate)

## 2019-04-09 NOTE — PROGRESS NOTES
Discharge Planning Assessment  [facility]     Patient Name: Cindy Rosa  MRN: [id number]  Today's Date: 4/9/2019    Admit Date: 4/8/2019    Discharge Needs Assessment     Row Name 04/09/19 1221       Living Environment    Lives With  alone    Current Living Arrangements  home/apartment/condo    Primary Care Provided by  self    Provides Primary Care For  no one    Family Caregiver if Needed  grandchild(kenan), adult    Quality of Family Relationships  supportive    Able to Return to Prior Arrangements  yes       Resource/Environmental Concerns    Home Accessibility Concerns  stairs to enter home    Transportation Concerns  car, none       Transition Planning    Patient/Family Anticipates Transition to  home    Patient/Family Anticipated Services at Transition  home health care    Transportation Anticipated  family or friend will provide       Discharge Needs Assessment    Readmission Within the Last 30 Days  no previous admission in last 30 days    Concerns to be Addressed  adjustment to diagnosis/illness;home safety    Equipment Currently Used at Home  cane, quad;wheelchair;walker, rolling    Anticipated Changes Related to Illness  none    Equipment Needed After Discharge  commode;other (see comments) Knee walker / scooter    Discharge Facility/Level of Care Needs  home with home health        Discharge Plan     Row Name 04/09/19 1213       Plan    Plan  Home with [facility] and DME from [facility]    Plan Comments  I was updated by Brie PT that pt has all the DME at home she will need except for a BSC and pt is wanting to rent a Knee Walker from [facility] [facility], she recommended pt have physical therapy at home.  I met with pt, she confirmed the address< PCP and pharmacy are correct. pt said her dtr Katja Garcia is her POA, I request a copy of the paperwork be provided to the hospital.  Pt said she lives alone, only DME she uses is a CPAP, she said she has never had home health and selected [facility]  from the list (referral called to Dannielle's voice mail and she left me a return voice mail that she will follow an be by the hospital this afternoon).  I called DME referral to Darlene, she confirmed the DME can be delivered to pts room as pt requested but it may be later this afternoon.  I updated pt re DME, she said her grandson Ruslan will be picking her up and she will decide at that time if they want it delivered to her home or if they will pick it up on the way home ([facility] updated).    Niya Felton RN    Final Discharge Disposition Code  06 - home with home health care        Destination      No service coordination in this encounter.      Durable Medical Equipment      Service Provider Request Status Selected Services Address Phone Number Fax Number    [facility] Pending - Request Sent N/A [id number], [place] [id number] [phone]      Dialysis/Infusion      No service coordination in this encounter.      Home Medical Care      Service Provider Request Status Selected Services Address Phone Number Fax Number    [facility] Accepted N/A [id number], [place] [id number] [phone]        Expected Discharge Date and Time     Expected Discharge Date Expected Discharge Time    Apr 9, 2019         Demographic Summary     Row Name 04/09/19 1220       General Information    Admission Type  observation    Arrived From  home    Referral Source  admission list    Reason for Consult  discharge planning    Preferred Language  English     Used During This Interaction  no       Contact Information    Permission Granted to Share Info With  family/designee;facility         Functional Status     Row Name 04/09/19 1221       Functional Status, IADL    Medications  independent    Meal Preparation  independent    Housekeeping  independent    Laundry  independent    Shopping  independent       Patient Forms     Row  Name 04/09/19 1223       Patient Forms    Provider Choice List  Delivered    Delivered to  Patient    Method of delivery  In person            Niya Almonte RN

## 2019-04-09 NOTE — THERAPY EVALUATION
"Acute Care - Physical Therapy Initial Evaluation  [facility]     Patient Name: Cindy Rosa  : 1945  MRN: [id number]  Today's Date: 2019   Onset of Illness/Injury or Date of Surgery: 19  Date of Referral to PT: 19  Referring Physician: Joon      Admit Date: 2019    Visit Dx:     ICD-10-CM ICD-9-CM   1. Impaired functional mobility and activity tolerance Z74.09 V49.89     Patient Active Problem List   Diagnosis   • Pilon fracture of left tibia     Past Medical History:   Diagnosis Date   • Diarrhea    • Fibula fracture 3/209    LEFT   • History of breast cancer 2016   • History of chemotherapy    • History of recent fall 2019   • Hyperlipidemia    • Hypertension    • Neuropathy    • SOB (shortness of breath) on exertion    • Urinary frequency      Past Surgical History:   Procedure Laterality Date   • BREAST BIOPSY Left    • COLONOSCOPY     • DILATION AND CURETTAGE, DIAGNOSTIC / THERAPEUTIC     • MASTECTOMY Left 2016   • WISDOM TOOTH EXTRACTION     • WRIST FRACTURE SURGERY Right         PT ASSESSMENT (last 12 hours)      Physical Therapy Evaluation     Row Name 19 5245          PT Evaluation Time/Intention    Subjective Information  no complaints  -PC     Document Type  evaluation  -PC     Mode of Treatment  physical therapy  -PC     Patient Effort  good  -PC     Symptoms Noted During/After Treatment  none  -PC     Comment  pt c/o LLE is still \"heavy\" from block  -PC     Row Name 19 5462          General Information    Patient Profile Reviewed?  yes  -PC     Onset of Illness/Injury or Date of Surgery  19  -PC     Referring Physician  Joon  -PC     Patient Observations  alert;cooperative;agree to therapy  -PC     Patient/Family Observations  pt is in bed, no acute distress, LLE splinted  -PC     Prior Level of Function  independent:  -PC     Equipment Currently Used at Home  -- has rollator, going to borrow w/c, needs BSC  -PC     Pertinent History of Current " Functional Problem  L tibia pilon fx, s/p ORIF  -PC     Existing Precautions/Restrictions  fall;non-weight bearing  -PC     Row Name 04/09/19 0930          Relationship/Environment    Lives With  alone  -PC     Concerns About Impact on Relationships  pt states she has good support system  -     Row Name 04/09/19 0930          Resource/Environmental Concerns    Home Accessibility Concerns  -- pt with no stairs if she enters from back entrance  -PC     Row Name 04/09/19 0930          Cognitive Assessment/Intervention- PT/OT    Orientation Status (Cognition)  oriented x 4  -PC     Follows Commands (Cognition)  WNL  -PC     Cognitive Assessment/Intervention Comment  pt shows good judgement and good understanding of NWB, chele have assist to get into her house  -PC     Row Name 04/09/19 0930          Mobility Assessment/Treatment    Extremity Weight-bearing Status  left lower extremity  -PC     Left Lower Extremity (Weight-bearing Status)  non weight-bearing (NWB)  -PC     Row Name 04/09/19 0930          Bed Mobility Assessment/Treatment    Bed Mobility Assessment/Treatment  supine-sit;sit-supine  -PC     Supine-Sit [place] (Bed Mobility)  independent  -PC     Sit-Supine [place] (Bed Mobility)  independent  -PC     Row Name 04/09/19 0930          Transfer Assessment/Treatment    Transfer Assessment/Treatment  squat pivot transfer  -     Comment (Transfers)  [place] was able [place] [facility] [place] maintaining NWB status LLE safely, she transferred bed to BSC, back to bed, then bed to chair  -     Row Name 04/09/19 0930          Squat Pivot Transfer    Squat Pivot [place] (Transfers)  contact guard  -     Row Name 04/09/19 0930          General ROM    GENERAL ROM COMMENTS  WFL x L ankle  -PC     Row Name 04/09/19 0930          MMT (Manual Muscle Testing)    General MMT Comments  WFL x LLE, left leg is still a little weak from block, 3-/5  -PC     Row Name 04/09/19 0930          Pain Assessment     Additional Documentation  Pain Scale: Numbers Pre/Post-Treatment (Group);Pain Scale: Word Pre/Post-Treatment (Group)  -PC     Row Name 04/09/19 0930          Pain Scale: Numbers Pre/Post-Treatment    Pain Intervention(s)  Medication (See MAR);Repositioned  -PC     Row Name 04/09/19 0930          Pain Scale: Word Pre/Post-Treatment    Pain: Word Scale, Pretreatment  2 - mild pain  -PC     Pain: Word Scale, Post-Treatment  2 - mild pain  -PC     Row Name             Wound 04/08/19 1829 Left leg incision    Wound - Properties Group Date first assessed: 04/08/19  - Time first assessed: 1829  -AH Side: Left  -AH Location: leg  -AH Type: incision  -AH    Row Name 04/09/19 0930          Plan of Care Review    Plan of Care Reviewed With  patient  -PC     Row Name 04/09/19 0930          Physical Therapy Clinical Impression    Date of Referral to PT  04/09/19  -PC     Patient/Family Goals Statement (PT Clinical Impression)  go home  -PC     Criteria for Skilled Interventions Met (PT Clinical Impression)  yes;treatment indicated  -PC     Pathology/Pathophysiology Noted (Describe Specifically for Each System)  musculoskeletal  -PC     Impairments Found (describe specific impairments)  gait, locomotion, and balance  -PC     Rehab Potential (PT Clinical Summary)  good, to achieve stated therapy goals  -PC     Patient/Family Concerns, Anticipated Discharge Disposition (PT)  pt will have people checking on her, but lives alone  -PC     Row Name 04/09/19 0930          Positioning and Restraints    Pre-Treatment Position  in bed  -PC     Post Treatment Position  chair  -PC     In Chair  reclined;call light within reach;encouraged to call for assist;exit alarm on;with family/caregiver  -PC       User Key  (r) = Recorded By, (t) = Taken By, (c) = Cosigned By    Initials Name Provider Type    PC Brie Ching, PT Physical Therapist    Yesica Hendrix, RN Registered Nurse        Physical Therapy Education     Title: PT OT SLP Therapies  (Done)     Topic: Physical Therapy (Done)     Point: Mobility training (Done)     Learning Progress Summary           Patient Acceptance, E,D, DU by PC at 4/9/2019  9:46 AM                   Point: Precautions (Done)     Learning Progress Summary           Patient Acceptance, E,D, DU by PC at 4/9/2019  9:46 AM                               User Key     Initials Effective Dates Name Provider Type Discipline    PC 04/03/18 -  Brie Ching, PT Physical Therapist PT              PT Recommendation and Plan  Anticipated Discharge Disposition (PT): home with home health, home with assist  Planned Therapy Interventions (PT Eval): bed mobility training, transfer training  Therapy Frequency (PT Clinical Impression): daily  Outcome Summary/Treatment Plan (PT)  Anticipated Equipment Needs at Discharge (PT): bedside commode, rolling knee walker  Anticipated Discharge Disposition (PT): home with home health, home with assist  Patient/Family Concerns, Anticipated Discharge Disposition (PT): pt will have people checking on her, but lives alone  Plan of Care Reviewed With: patient  Outcome Summary: pt presents with impaired functional mobility with NWB status after ORIF L LE, she was able to transfer bed to BS. bed to chair using a squat pivot technique and maintain NWB status, she shows good judgement and understanding of NWB status, pt to go home today, has walker, needs BSC, pt going to rent a rolling knee scooter, and she is borrowing a w/c. plans are for pt to go home today  Outcome Measures     Row Name 04/09/19 0900             How much help from another person do you currently need...    Turning from your back to your side while in flat bed without using bedrails?  4  -PC      Moving from lying on back to sitting on the side of a flat bed without bedrails?  4  -PC      Moving to and from a bed to a chair (including a wheelchair)?  3  -PC      Standing up from a chair using your arms (e.g., wheelchair, bedside chair)?  3   -PC      Climbing 3-5 steps with a railing?  1  -PC      To walk in hospital room?  2  -PC      AM-PAC 6 Clicks Score  17  -PC         Functional Assessment    Outcome Measure Options  AM-PAC 6 Clicks Basic Mobility (PT)  -PC        User Key  (r) = Recorded By, (t) = Taken By, (c) = Cosigned By    Initials Name Provider Type    PC Brie Ching, PT Physical Therapist         Time Calculation:   PT Charges     Row Name 04/09/19 0950             Time Calculation    Start Time  0840  -PC      Stop Time  0911  -PC      Time Calculation (min)  31 min  -PC      PT Received On  04/09/19  -PC        User Key  (r) = Recorded By, (t) = Taken By, (c) = Cosigned By    Initials Name Provider Type    PC Brie Ching, PT Physical Therapist        Therapy Charges for Today     Code Description Service Date Service Provider Modifiers Qty    04966326542 HC PT EVAL MOD COMPLEXITY 2 4/9/2019 Brie Ching, PT GP 1    59329978966 HC PT THER PROC EA 15 MIN 4/9/2019 Brie Ching, PT GP 1    35642945043 HC PT THER SUPP EA 15 MIN 4/9/2019 Brie Ching, PT GP 1          PT G-Codes  Outcome Measure Options: AM-PAC 6 Clicks Basic Mobility (PT)  AM-PAC 6 Clicks Score: 17      Brie Ching, PT  4/9/2019

## 2019-04-10 NOTE — PROGRESS NOTES
Discharge Planning Assessment  [facility]     Patient Name: Cindy Rosa  MRN: [id number]  Today's Date: 4/10/2019    Admit Date: 4/8/2019    Discharge Needs Assessment    No documentation.       Discharge Plan     Row Name 04/10/19 1131       Plan    Plan Comments  Corrected Code: 01 Home w/o needs  Pt provided DME order for BSC, she said they would pick it up from [place] on their way home.  Dannielle with [facility] stopped in today to advise Dr Dupree declined to cosign order for home health, due to he did not feel pt needed it.  Dannielle said she called pt at home and pt said she feels okay w/o home health.    Niya Almonte RN    Final Discharge Disposition Code  01 - home or self-care        Destination      No service coordination in this encounter.      Durable Medical Equipment      Service Provider Request Status Selected Services Address Phone Number Fax Number    [facility] Pending - Request Sent N/A [address], [place] [id number] [phone]      Dialysis/Infusion      No service coordination in this encounter.      Home Medical Care      Service Provider Request Status Selected Services Address Phone Number Fax Number    [facility] Declined  per Dannielle with HH, attending did not want home health N/A [address], [place] [address] [phone]      Therapy      No service coordination in this encounter.      Community Resources      No service coordination in this encounter.        Expected Discharge Date and Time     Expected Discharge Date Expected Discharge Time    Apr 9, 2019          Niya Almonte, RN

## 2019-06-25 ENCOUNTER — HOSPITAL ENCOUNTER (OUTPATIENT)
Dept: OTHER | Facility: HOSPITAL | Age: 74
Discharge: HOME | End: 2019-06-25
Attending: NURSE PRACTITIONER

## 2019-08-27 ENCOUNTER — HOSPITAL ENCOUNTER (OUTPATIENT)
Dept: ONCOLOGY | Facility: HOSPITAL | Age: 74
Discharge: HOME | End: 2019-08-27
Attending: INTERNAL MEDICINE

## 2019-08-27 ENCOUNTER — OFFICE VISIT CONVERTED (OUTPATIENT)
Dept: ONCOLOGY | Facility: HOSPITAL | Age: 74
End: 2019-08-27
Attending: INTERNAL MEDICINE

## 2019-09-05 ENCOUNTER — HOSPITAL ENCOUNTER (OUTPATIENT)
Dept: SLEEP MEDICINE | Facility: HOSPITAL | Age: 74
Discharge: HOME | End: 2019-09-05
Attending: INTERNAL MEDICINE

## 2019-09-09 ENCOUNTER — HOSPITAL ENCOUNTER (OUTPATIENT)
Dept: OTHER | Facility: HOSPITAL | Age: 74
Discharge: HOME | End: 2019-09-09
Attending: NURSE PRACTITIONER

## 2020-02-25 ENCOUNTER — HOSPITAL ENCOUNTER (OUTPATIENT)
Dept: ONCOLOGY | Facility: HOSPITAL | Age: 75
Discharge: HOME | End: 2020-02-25
Attending: INTERNAL MEDICINE

## 2020-02-25 ENCOUNTER — OFFICE VISIT CONVERTED (OUTPATIENT)
Dept: ONCOLOGY | Facility: HOSPITAL | Age: 75
End: 2020-02-25
Attending: INTERNAL MEDICINE

## 2020-09-10 ENCOUNTER — HOSPITAL ENCOUNTER (OUTPATIENT)
Dept: OTHER | Facility: HOSPITAL | Age: 75
Discharge: HOME | End: 2020-09-10
Attending: INTERNAL MEDICINE

## 2020-09-14 ENCOUNTER — HOSPITAL ENCOUNTER (OUTPATIENT)
Dept: ONCOLOGY | Facility: HOSPITAL | Age: 75
Discharge: HOME | End: 2020-09-14
Attending: INTERNAL MEDICINE

## 2020-09-14 ENCOUNTER — OFFICE VISIT CONVERTED (OUTPATIENT)
Dept: ONCOLOGY | Facility: HOSPITAL | Age: 75
End: 2020-09-14
Attending: INTERNAL MEDICINE

## 2020-10-15 ENCOUNTER — HOSPITAL ENCOUNTER (OUTPATIENT)
Dept: SLEEP MEDICINE | Facility: HOSPITAL | Age: 75
Discharge: HOME | End: 2020-10-15
Attending: INTERNAL MEDICINE

## 2021-02-02 ENCOUNTER — HOSPITAL ENCOUNTER (OUTPATIENT)
Dept: VACCINE CLINIC | Facility: HOSPITAL | Age: 76
Discharge: HOME | End: 2021-02-02
Attending: INTERNAL MEDICINE

## 2021-02-26 ENCOUNTER — HOSPITAL ENCOUNTER (OUTPATIENT)
Dept: VACCINE CLINIC | Facility: HOSPITAL | Age: 76
Discharge: HOME | End: 2021-02-26
Attending: INTERNAL MEDICINE

## 2021-03-15 ENCOUNTER — OFFICE VISIT CONVERTED (OUTPATIENT)
Dept: ONCOLOGY | Facility: HOSPITAL | Age: 76
End: 2021-03-15
Attending: INTERNAL MEDICINE

## 2021-03-15 ENCOUNTER — HOSPITAL ENCOUNTER (OUTPATIENT)
Dept: ONCOLOGY | Facility: HOSPITAL | Age: 76
Discharge: HOME | End: 2021-03-15
Attending: INTERNAL MEDICINE

## 2021-05-16 VITALS — RESPIRATION RATE: 16 BRPM | BODY MASS INDEX: 39.27 KG/M2 | WEIGHT: 230 LBS | HEIGHT: 64 IN

## 2021-05-16 VITALS — RESPIRATION RATE: 16 BRPM | BODY MASS INDEX: 38.93 KG/M2 | HEIGHT: 64 IN | WEIGHT: 228 LBS

## 2021-05-16 VITALS — BODY MASS INDEX: 39.27 KG/M2 | WEIGHT: 230 LBS | HEIGHT: 64 IN | RESPIRATION RATE: 16 BRPM

## 2021-05-16 VITALS
HEIGHT: 64 IN | HEART RATE: 66 BPM | RESPIRATION RATE: 12 BRPM | OXYGEN SATURATION: 96 % | BODY MASS INDEX: 38.94 KG/M2 | WEIGHT: 228.06 LBS | DIASTOLIC BLOOD PRESSURE: 59 MMHG | SYSTOLIC BLOOD PRESSURE: 159 MMHG

## 2021-05-16 VITALS — RESPIRATION RATE: 16 BRPM | HEIGHT: 64 IN | BODY MASS INDEX: 38.93 KG/M2 | WEIGHT: 228 LBS

## 2021-05-22 ENCOUNTER — TRANSCRIBE ORDERS (OUTPATIENT)
Dept: MAMMOGRAPHY | Facility: HOSPITAL | Age: 76
End: 2021-05-22

## 2021-05-22 DIAGNOSIS — Z12.31 VISIT FOR SCREENING MAMMOGRAM: Primary | ICD-10-CM

## 2021-05-28 VITALS
OXYGEN SATURATION: 95 % | BODY MASS INDEX: 39.02 KG/M2 | HEART RATE: 68 BPM | RESPIRATION RATE: 18 BRPM | OXYGEN SATURATION: 97 % | HEIGHT: 65 IN | TEMPERATURE: 98.6 F | BODY MASS INDEX: 37.17 KG/M2 | SYSTOLIC BLOOD PRESSURE: 137 MMHG | RESPIRATION RATE: 18 BRPM | HEART RATE: 81 BPM | SYSTOLIC BLOOD PRESSURE: 152 MMHG | DIASTOLIC BLOOD PRESSURE: 74 MMHG | SYSTOLIC BLOOD PRESSURE: 157 MMHG | DIASTOLIC BLOOD PRESSURE: 75 MMHG | SYSTOLIC BLOOD PRESSURE: 149 MMHG | SYSTOLIC BLOOD PRESSURE: 156 MMHG | TEMPERATURE: 98.2 F | RESPIRATION RATE: 16 BRPM | BODY MASS INDEX: 38.24 KG/M2 | TEMPERATURE: 97.6 F | WEIGHT: 221.34 LBS | RESPIRATION RATE: 16 BRPM | WEIGHT: 229.5 LBS | RESPIRATION RATE: 16 BRPM | HEART RATE: 52 BPM | HEART RATE: 66 BPM | WEIGHT: 238.1 LBS | TEMPERATURE: 97.9 F | BODY MASS INDEX: 38.24 KG/M2 | OXYGEN SATURATION: 97 % | WEIGHT: 229.5 LBS | DIASTOLIC BLOOD PRESSURE: 68 MMHG | HEIGHT: 65 IN | TEMPERATURE: 97.7 F | OXYGEN SATURATION: 93 % | HEIGHT: 65 IN | HEART RATE: 71 BPM | OXYGEN SATURATION: 97 % | DIASTOLIC BLOOD PRESSURE: 60 MMHG | DIASTOLIC BLOOD PRESSURE: 67 MMHG | BODY MASS INDEX: 36.27 KG/M2 | WEIGHT: 223.11 LBS

## 2021-05-28 VITALS
WEIGHT: 227.96 LBS | BODY MASS INDEX: 38.6 KG/M2 | SYSTOLIC BLOOD PRESSURE: 154 MMHG | TEMPERATURE: 98 F | OXYGEN SATURATION: 97 % | DIASTOLIC BLOOD PRESSURE: 58 MMHG | OXYGEN SATURATION: 97 % | BODY MASS INDEX: 37.98 KG/M2 | DIASTOLIC BLOOD PRESSURE: 62 MMHG | HEIGHT: 65 IN | TEMPERATURE: 97.7 F | HEART RATE: 74 BPM | WEIGHT: 231.7 LBS | SYSTOLIC BLOOD PRESSURE: 143 MMHG | HEIGHT: 65 IN | HEART RATE: 71 BPM

## 2021-05-28 VITALS
DIASTOLIC BLOOD PRESSURE: 84 MMHG | SYSTOLIC BLOOD PRESSURE: 150 MMHG | WEIGHT: 227.29 LBS | OXYGEN SATURATION: 98 % | HEART RATE: 101 BPM | TEMPERATURE: 97.2 F | BODY MASS INDEX: 37.25 KG/M2 | RESPIRATION RATE: 18 BRPM

## 2021-05-28 VITALS
TEMPERATURE: 98.3 F | SYSTOLIC BLOOD PRESSURE: 167 MMHG | HEART RATE: 89 BPM | OXYGEN SATURATION: 98 % | DIASTOLIC BLOOD PRESSURE: 83 MMHG | WEIGHT: 233.47 LBS | HEIGHT: 65 IN | BODY MASS INDEX: 38.9 KG/M2

## 2021-05-28 VITALS
OXYGEN SATURATION: 95 % | WEIGHT: 237.66 LBS | SYSTOLIC BLOOD PRESSURE: 158 MMHG | BODY MASS INDEX: 38.95 KG/M2 | HEART RATE: 73 BPM | RESPIRATION RATE: 16 BRPM | DIASTOLIC BLOOD PRESSURE: 80 MMHG | TEMPERATURE: 98.7 F

## 2021-05-28 NOTE — PROGRESS NOTES
Patient: KIKA CHRISTIE     Acct: [id number]     Report: #[id number]  UNIT #: [id number]     : 1945    Encounter Date:2019  PRIMARY CARE: JHONATAN TAN  ***Signed***  --------------------------------------------------------------------------------------------------------------------  NURSE INTAKE      Visit Type      Established Patient Visit            Chief Complaint      CHECK UP      Intent of Therapy:  Curative            Referring Provider/Copies To      Referring Provider:  XU BURRIS      Primary Care Provider:  XU BURRIS            History and Present Illness      Past Oncology Illness History      Ms. Christie is a very pleasant 72-year-old  female with a recently     diagnosed left-sided breast cancer. Initial breast biopsy was performed on     10/10/2016 with biopsy of an area of concern of calcifications in the left     breast. 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 She then underwent a     needle lobe lumpectomy on 10/25/2016. The lumpectomy revealed an incidental     finding of grade 3 invasive lobular carcinoma the left breast. Estrogen receptor    +70%, progesterone receptor -0%, HER-2/young was equivocal 2+ IHC, Ki-67 10%. 5     sentinel lymph nodes were removed and one was found to contain isolated tumor     cells. Multiple margins were positive which required a reexcision. The     reexcision was performed on 2016 and was performed via total mastectomy.     [place] lymph node evaluation was also performed. The size of the largest     invasive carcinoma was 9 mm but multiple foci of invasive carcinoma were noted.     Overall grade was again grade 3. All margins were uninvolved. Total lymph nodes     removed were 16 and unfortunately one of these was involved with macro     metastatic disease. This was staged as a pT1b pN1a stage IIA breast cancer.            HPI - Oncology Interim      F/u left breast cancer--taking Arimidex w/o issues at this time.  She does      currently have a brace on her left ankle-she broke leg/ankle while on vacation.     Feeling better at this time.  She denies changes with either breast at this     time.  Denies nipple drng or skin changes.  No distress noted.            Cancer Details            Left breast with multifocal invasive lobular carcinoma intermediate grade      ER/MI+ HER2+ Ki 67 25%            Clinical Staging      Stage IIA (T1b N1aM0)            Treatments      Chemotherapy      12/28/16 thru 4/11/17 completed 6C TCHP chemo; 12/19/17 completed 1yr Herceptin     therapy;  5/23/17 Arimidex initiated;   3/1/18 Nerlynx 240mg prescribed      Radiation Therapy      5/2/17 declined radiation therapy            Clinical Trial Participant      No            ECOG Performance Status      0            Most Recent Imaging Findings      6/25/19            PROCEDURE:   BONE DENSITY SCAN GREATER THAN OR EQUAL TO ONE SITE             COMPARISON:   [facility], OS, BONE DENSITY SCAN => 1 SITE,     6/14/2017, 9:03.             TECHNIQUE:   Lumbar vertebral and proximal femoral dual-energy X-ray     absorptiometry (DXA) was       performed on a central Viewpoint Digital device.        INDICATIONS:   POSTMENOPAUSAL, AROMATASE INHIBITOR USE             FINDINGS:   The bone mineral density measured in the lumbar spine is 1.362     gm/cm2, corresponding to a       T-score of 1.4.                The bone mineral density measured at the hip is 0.907 gm/cm2, corresponding     to a T-score of -0.9.                   This patient is considered within normal limits according to World Health     Organization criteria.                         FRAX analysis suggested that the 10 year risk of a major osteoporotic     fracture to be 13% and a hip       fracture of 1.2%.                                                  CONCLUSION:         1. Lumbar Spine:   Within normal limits      2. Femoral Neck:    Within normal limits            PAST, FAMILY   Past Medical  History      Past Medical History:  Hepatitis, High Cholesterol, Hypertension      Other PMH:        NEUROPATHY      Hematology/Oncology (F):  Breast Cancer            Past Surgical History      Biopsy (left breast), Fracture Repair (wrist right/R ANKLE), Lumpectomy (left),     Mastectomy Left, VAD Placement (and removal)            Family History      Family History:  Leukemia (mother), Melanoma (mother), Neurologic Cancer     (father)            Social History      Marital Status:        Lives independently:  Yes      Number of Children:  4      Occupation:  Retired - Nurse            Tobacco Use      Tobacco status:  Former smoker      Smoking packs/day:  1/4      Quit status:  Quit date established            Alcohol Use      Alcohol intake:  0-2 drinks per day            Substance Use      Substance use:  Denies use            REVIEW OF SYSTEMS      General:  Admits: Weight Loss;          Denies: Fatigue      Eye:  Admits: Corrective Lenses      ENT:  Denies: Hearing Loss      Cardiovascular:  Admits: Edema Legs      Gastrointestinal:  Denies: Nausea      Musculoskeletal:  Admits: Muscle Pain      Integumentary:  Denies: Bruises Easily      Neurologic:  Admits: Dizziness      Psychiatric:  Denies: Anxiety      Endocrine:  Denies: Diabetes            VITAL SIGNS AND SCORES      Vitals      Weight 221 lbs 5.469 oz / 100.4 kg      Temperature 97.9 F / 36.61 C - Temporal      Pulse 52      Respirations 18      Blood Pressure 149/74 Sitting, Right Arm      Pulse Oximetry 93%, RM AIR            Pain Score      Pain Scale Used:  Numerical      Pain Intensity:  0            Fatigue Score      Fatigue (0-10 scale):  0 (none)            EXAM      General Appearance:  Positive for: Alert, Oriented x3, Cooperative;          Negative for: Acute Distress      Neck:  Positive for: Supple;          Negative for: JVD, Masses      Respiratory:  Positive for: CTAB, Normal Respiratory Effort      Abdomen/Gastro:  Positive  for: Normal Active Bowel Sounds, Soft;          Negative for: Distention, Hepatosplenomegaly, Tenderness      Cardiovascular:  Positive for: RRR;          Negative for: Gallop, Murmur, Peripheral Edema, Rub      Psychiatric:  Positive for: Appropriate Affect, Intact Judgement      Lymphatic:  Negative for: Cervical, Infraclavicular, Supraclavicular            PREVENTION      Hx Influenza Vaccination:  No      Influenza Vaccine Declined:  Yes      2 or More Falls Past Year?:  No      Fall Past Year with Injury?:  No      Encouraged to follow-up with:  PCP regarding preventative exams.      Chart initiated by      ANTONIO MAYORGA MA            ALLERGY/MEDS      Allergies      Coded Allergies:             NO KNOWN ALLERGIES (Unverified , 8/27/19)            Medications      Last Reconciled on 8/27/19 11:22 by BRYANT MENDOZA      Anastrozole (Anastrozole) 1 Mg Tablet      1 MG PO QDAY, #30 TAB 9 Refills         Prov: KIRSTIN GRIJALVA         7/31/19       Hydrocodone/Acetaminophen 5/325 MG (Norco 5/325 Mg) 1 Tab Tab      2 TAB PO Q4H PRN for pain, #30 TAB 0 Refills         Prov: JELENA CORTES MD         11/20/18       Cyanocobalamin (Vitamin B-12*) 2,500 Mcg Tab.subl      2500 MCG PO QDAY, #30 TAB.SL         Reported         11/15/18       Neratinib Maleate (Nerlynx) 40 Mg Tablet      240 MG PO QDAY for 30 Days, #180 TAB         Reported         4/10/18       Cholecalciferol (Vitamin D3) (Vitamin D3) 2,000 Unit Cap      2000 UNITS PO QDAY, #30 CAP 0 Refills         Reported         2/7/18       Calcium Carb/Vit D3 (CALCIUM 600-VIT D3 400 TABLET) 1 Each Tablet      1 EACH PO QDAY, #60 TAB 0 Refills         Reported         2/7/18       Metoprolol Succinate (Metoprolol Succinate) 50 Mg Tab.er.24h      50 MG PO QDAY, #30 TAB.SR.24H 0 Refills         Reported         2/7/18       Melatonin (Melatonin) 10 Mg Tablet      10 MG PO HS, TAB         Reported         9/5/17       Aspirin Chew (Aspirin Baby) 81 Mg  Tab.chew      81 MG PO QDAY, #30 TAB.CHEW 0 Refills         Reported         7/5/17       Multivitamins W-Minerals/Lut (Daily Multi-Vitamins with Minerals) 1 Each Tablet      1 TAB PO QDAY for 30 Days, #30 TAB         Reported         4/26/17       Omeprazole (Omeprazole*) 20 Mg Tablet.dr      20 MG PO QDAY, #30 CAP 0 Refills         Reported         4/26/17       Oxybutynin Chloride (Oxybutynin Chloride) 5 Mg Tablet      5 MG PO QDAY, TAB         Reported         4/26/17       Simvastatin (Simvastatin*) 20 Mg Tablet      20 MG PO QDAY, #30 TAB 0 Refills         Reported         4/26/17       Hydrochlorothiazide (Hydrochlorothiazide*) 12.5 Mg Capsule      12.5 MG PO QDAY, #30 CAP 0 Refills         Reported         4/26/17       Ondansetron Hcl (ONDANSETRON HCL) 8 Mg Tablet      8 MG PO Q6H PRN for NAUSEA, TAB 0 Refills         Reported         4/26/17       Diphenoxylate/Atropine (Lomotil) 1 Each Tablet      2.5 MG PO BID PRN for DIARRHEA, TAB         Reported         4/26/17       Loratadine (Claritin) 10 Mg Tablet      5 MG PO QDAY PRN for ALLERGIES, #15 TAB 0 Refills         Reported         4/26/17       Acetaminophen Es (Tylenol Extra Strength) 500 Mg Tablet      500 MG PO BID PRN for JOINT PAIN, #100 TAB 0 Refills         Reported         4/26/17      Medications Reviewed:  No Changes made to meds            IMPRESSION/PLAN      Diagnosis            Breast cancer metastasized to axillary lymph node         Carcinoma of left breast metastatic to axillary lymph node - C50.912, C77.3         Laterality: left      Status post treatment as outlined above.  Patient is currently on adjuvant     anastrozole 1 mg daily, now slightly more than 2 years into her regimen.      Tolerating well.  I see no evidence of disease recurrence by her history,     physical examination.  Recent DEXA scan shows normal bone density.  She is due     for mammogram which will be scheduled at her convenience.  I will see her back     in 6  months for ongoing treatment            Notes      New Diagnostics      * Screening Mammo, 2 Week         Dx: Breast cancer screening - Z12.39            Patient Education            Mammography      Patient Education Provided:  Yes            Topics Patient Counseled on      Possible s/sx r/t breast cancer recurrence//metastases                 Disclaimer: Converted document may not contain table formatting or lab diagrams. Please see [employer] for the authenticated document.

## 2021-05-28 NOTE — PROGRESS NOTES
Patient: KIKA ROSA     Acct: [id number]     Report: #[id number]  UNIT #: [id number]     : 1945    Encounter Date:2019  PRIMARY CARE: JHONATAN HURT  ***Signed***  --------------------------------------------------------------------------------------------------------------------  NURSE INTAKE      Visit Type      Established Patient Visit            Chief Complaint      Left breast ca      Intent of Therapy:  Curative            Referring Provider/Copies To      Referring Provider:  XU BURRIS      Provider Not Found in Lookup:  Dr. Jhonatan Hurt            History and Present Illness      Past Oncology Illness History      Ms. Rosa is a very pleasant 72-year-old  female with a recently     diagnosed left-sided breast cancer. Initial breast biopsy was performed on     10/10/2016 with biopsy of an area of concern of calcifications in the left     breast. 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 She then underwent a     needle lobe lumpectomy on 10/25/2016. The lumpectomy revealed an incidental     finding of grade 3 invasive lobular carcinoma the left breast. Estrogen receptor    +70%, progesterone receptor -0%, HER-2/young was equivocal 2+ IHC, Ki-67 10%. 5     sentinel lymph nodes were removed and one was found to contain isolated tumor     cells. Multiple margins were positive which required a reexcision. The     reexcision was performed on 2016 and was performed via total mastectomy.     [place] lymph node evaluation was also performed. The size of the largest     invasive carcinoma was 9 mm but multiple foci of invasive carcinoma were noted.     Overall grade was again grade 3. All margins were uninvolved. Total lymph nodes     removed were 16 and unfortunately one of these was involved with macro     metastatic disease. This was staged as a pT1b pN1a stage IIA breast cancer.            HPI - Oncology Interim      Pt presents to clinic for f/u left breast ca.  She is currently  "taking Nerlynx     (will complete in 3/2019) and Arimidex daily w/o issues.  Mammogram completed     9/7/18; however bone density will be due 6/2019.  She reports some discomfort at    left mastectomy/axillary area--she denies new \"lumps/bumps\" in the area.  The     area is without induration or inflammation and skin remains intact.  Pt will     traveling abroad in March to [facility] and is very excited.  No fever,     lymphadenopathy or distress noted.            Cancer Details            Left breast with multifocal invasive lobular carcinoma intermediate grade      ER/NE+ HER2+ Ki 67 25%            Clinical Staging      Stage IIA (T1b N1aM0)            Treatments      Chemotherapy      12/28/16 thru 4/11/17 completed 6C TCHP chemo; 12/19/17 completed 1yr Herceptin     therapy;  5/23/17 Arimidex initiated;   3/1/18 Nerlynx 240mg prescribed      Radiation Therapy      5/2/17 declined radiation therapy            Clinical Trial Participant      No            ECOG Performance Status      0            PAST, FAMILY   Past Medical History      Past Medical History:  High Cholesterol, Hypertension      Hematology/Oncology (F):  Breast Cancer            Past Surgical History      Biopsy (left breast), Fracture Repair (wrist right), Lumpectomy (left),     Mastectomy Left, VAD Placement (and removal)      dnc            Family History      Family History:  Leukemia (mother), Melanoma (mother), Neurologic Cancer     (father)            Social History      Marital Status:        Lives independently:  Yes      Number of Children:  4      Occupation:  Retired - Nurse            Tobacco Use      Tobacco status:  Former smoker      Smoking packs/day:  1/4      Quit status:  Quit date established            Alcohol Use      Alcohol intake:  0-2 drinks per day            Substance Use      Substance use:  Denies use            REVIEW OF SYSTEMS      General:  Denies: Appetite Change, Fatigue, Fever, Night Sweats, Weight Gain, "     Weight Loss      Eye:  Denies: Blurred Vision, Corrective Lenses, Diplopia, Eye Irritation, Eye     Pain, Eye Redness, Spots in Vision, Vision Loss      ENT:  Denies: Headache, Hearing Loss, Hoarseness, Seizures, Sinus Congestion,     Sore Throat      Cardiovascular:  Denies: Chest Pain, Edema Ankles, Edema Legs, Irregular     Heartbeat, Palpitations      Respiratory:  Denies: Coughing Blood, Productive Cough, Shortness of Air,     Wheezing      Gastrointestinal:  Denies: Bloody Stools, Constipation, Diarrhea, Frequent     Heartburn, Nausea, Problem Swallowing, Tarry Stools, Unable to Control Bowels,     Vomiting      Genitourinary (female):  Denies: Blood in Urine, Decrease Urine Stream, Frequent    Urination, Incontinence, Painful Urination      Musculoskeletal:  Denies: Back Pain, Leg Cramps, Muscle Pain, Muscle Weakness,     Painful Joints, Swollen Joints      Integumentary:  Denies: Bleeds Easily, Bruises Easily, Hair Changes, Jaundice,     Lesions, Mole Changes, Nail Changes, Pigment Changes, Rash, Skin Discoloration      Neurologic:  Denies: Dizziness, Fainting, Numbness\Tingling, Paralysis, Seizures      Psychiatric:  Denies: Anxiety, Confused, Depression, Disoriented, Memory Loss      Endocrine:  Denies: Cold Intolerance, Diabetes, Excessive Sweating, Excessive     Thirst, Excessive Urination, Heat Intolerance, Flushing, Hyperthyroidism,     Hypothyroidism      Hematologic/Lymphatic:  Denies: Bruising, Bleeding, Enlarged Lymph Nodes,     Recurrent Infections, Transfusions            VITAL SIGNS AND SCORES      Vitals      Height 5 ft 5.08 in / 165.3 cm      Weight 229 lbs 7.982 oz / 104.1 kg      BSA 2.10 m2      BMI 38.1 kg/m2      Temperature 98.6 F / 37 C - Temporal      Pulse 66      Respirations 16      Blood Pressure 152/60 Sitting, Right Arm      Pulse Oximetry 97%, rm air            Pain Score      Pain Scale Used:  Numerical      Pain Intensity:  5            Fatigue Score      Fatigue (0-10  scale):  0 (none)            EXAM      General Appearance:  Positive for: Alert, Oriented x3, Cooperative;          Negative for: Acute Distress      Neck:  Positive for: Supple;          Negative for: JVD, Masses      Respiratory:  Positive for: CTAB, Normal Respiratory Effort      Breast - Left:  Positive for: Appearance (Well-healed lumpectomy incision);          Negative for: Adenopathy, Discharge, Mass, Skin Changes      Breast - Right:  Positive for: Appearance (Normal-appearing female breast);          Negative for: Adenopathy, Discharge, Mass, Skin Changes      Abdomen/Gastro:  Positive for: Normal Active Bowel Sounds, Soft;          Negative for: Distention, Hepatosplenomegaly, Tenderness      Cardiovascular:  Positive for: RRR;          Negative for: Gallop, Murmur, Peripheral Edema, Rub      Psychiatric:  Positive for: Appropriate Affect, Intact Judgement      Lymphatic:  Negative for: Axillary, Cervical, Infraclavicular, Supraclavicular            PREVENTION      Hx Influenza Vaccination:  No      Influenza Vaccine Declined:  Yes      2 or More Falls Past Year?:  No      Fall Past Year with Injury?:  No      Hx Pneumococcal Vaccination:  No      Encouraged to follow-up with:  PCP regarding preventative exams.      Chart initiated by      walter cordova ma            ALLERGY/MEDS      Allergies      Coded Allergies:             NO KNOWN ALLERGIES (Unverified , 1/22/19)            Medications      Last Reconciled on 1/22/19 15:09 by BRYANT MENDOZA      Hydrocodone/Acetaminophen 5/325 MG (Norco 5/325 Mg) 1 Tab Tab      2 TAB PO Q4H PRN for pain, #30 TAB 0 Refills         Prov: JELENA CORTES MD         11/20/18       Cyanocobalamin (Vitamin B-12*) 2,500 Mcg Tab.subl      2500 MCG PO QDAY, #30 TAB.SL         Reported         11/15/18       Anastrozole (Arimidex) 1 Mg Tablet      1 MG PO QDAY, #30 TAB 5 Refills         Prov: KIRSTIN GRIJALVA         5/23/18       Neratinib Maleate (Nerlynx) 40 Mg  Tablet      240 MG PO QDAY for 30 Days, #180 TAB         Reported         4/10/18       Cholecalciferol (Vitamin D*) 2,000 Unit Cap      2000 UNITS PO QDAY, #30 CAP 0 Refills         Reported         2/7/18       Calcium Carb/Vit D3 (CALCIUM 600-VIT D3 400 TABLET) 1 Each Tablet      1 EACH PO QDAY, #60 TAB 0 Refills         Reported         2/7/18       Metoprolol Succinate (Metoprolol Succinate*) 50 Mg Tab.er.24h      50 MG PO QDAY, #30 TAB.SR.24H 0 Refills         Reported         2/7/18       Melatonin (Melatonin) 10 Mg Tablet      10 MG PO HS, TAB         Reported         9/5/17       Aspirin Chew (Aspirin Baby) 81 Mg Tab.chew      81 MG PO QDAY, #30 TAB.CHEW 0 Refills         Reported         7/5/17       Multivitamins W-Minerals/Lut (Daily Multi-Vitamins with Minerals) 1 Each Tablet      1 TAB PO QDAY for 30 Days, #30 TAB         Reported         4/26/17       Omeprazole (Omeprazole*) 20 Mg Tablet.dr      20 MG PO QDAY, #30 CAP 0 Refills         Reported         4/26/17       Oxybutynin Chloride (Oxybutynin Chloride) 5 Mg Tablet      5 MG PO QDAY, TAB         Reported         4/26/17       Simvastatin (Simvastatin*) 20 Mg Tablet      20 MG PO QDAY, #30 TAB 0 Refills         Reported         4/26/17       Hydrochlorothiazide (Hydrochlorothiazide*) 12.5 Mg Capsule      12.5 MG PO QDAY, #30 CAP 0 Refills         Reported         4/26/17       Ondansetron Hcl (Ondansetron*) 8 Mg Tablet      8 MG PO Q6H PRN for NAUSEA, TAB 0 Refills         Reported         4/26/17       Diphenoxylate/Atropine (Lomotil) 1 Each Tablet      2.5 MG PO BID PRN for DIARRHEA, TAB         Reported         4/26/17       Loratadine (Claritin) 10 Mg Tablet      5 MG PO QDAY PRN for ALLERGIES, #15 TAB 0 Refills         Reported         4/26/17       Acetaminophen Es (Tylenol Extra Strength) 500 Mg Tablet      500 MG PO BID PRN for JOINT PAIN, #100 TAB 0 Refills         Reported         4/26/17      Medications Reviewed:  No Changes made to  meds            IMPRESSION/PLAN      Impression      Left breast with multifocal invasive lobular carcinoma intermediate grade      ER/ID+ HER2+ Ki 67 25%            Diagnosis      Breast cancer metastasized to axillary lymph node         Carcinoma of left breast metastatic to axillary lymph node - C50.912, C77.3         Laterality: left      Cont arimidex and nerlynx as prescribed. RTC 1 mo for ov with labs prior      New Diagnostics      * CMP Comp Metabolic Panel, Month      * CBC With Auto Diff, Month            Patient Education      Patient Education Provided:  Yes                 Disclaimer: Converted document may not contain table formatting or lab diagrams. Please see [employer] for the authenticated document.

## 2021-05-28 NOTE — H&P
Patient: KIKA CHRISTIE     Acct: [id number]     Report: #[id number]  UNIT #: [id number]     : 1945    Encounter Date:01/10/2018  PRIMARY CARE: JHONATAN HURT  ***Signed***  --------------------------------------------------------------------------------------------------------------------  Chief Complaint      [place] 10, 2018      BREAST CANCER      BREAST CA , TRIPLE POSITIVE, LEFT            Vitals      Height 5 ft 5.08 in / 165.3 cm      Weight 233 lbs 7.474 oz / 105.9 kg      BSA 2.26 m2      BMI 38.8 kg/m2      Temperature 98.3 F / 36.83 C - Temporal      Pulse 89      Blood Pressure 167/83 Sitting, Right Arm      Pulse Oximetry 98%, ROOM AIR            General Information      Referring Provider:  XU BURRIS      Provider Not Found in Lookup:  Dr. Jhonatan Hurt      [facility] Provider 2:  Roxanne Keita      [facility] Provider 3:  Reji Wild            Allergies      Coded Allergies:             NO KNOWN DRUG ALLERGIES (Verified  Allergy, Unknown, 1/10/18)            Medications      Last Reconciled on 1/10/18 13:38 by BRYANT CURIEL      Neratinib Maleate (Nerlynx) 40 Mg Tablet      240 MG PO QDAY for 30 Days, #180 TAB 5 Refills         Prov: Elvis Cuevas         1/10/18       Neratinib Maleate (Nerlynx) 40 Mg Tablet      240 MG PO QDAY for 30 Days, #180 TAB 5 Refills         Reported         1/10/18       Melatonin (Melatonin) 10 Mg Tablet      10 MG PO HS, TAB         Reported         17       traZODone HCl (traZODone HCl*) 50 Mg Tablet      50 MG PO HS Y for SLEEP, #30 TAB 0 Refills         Reported         17       Metoprolol Succinate (Metoprolol Succinate *) 100 Mg Tab.er.24h      50 MG PO QDAY, #15 TAB.SR.24H         Reported         17       Aspirin (Aspirin Baby *) 81 Mg Tab.chew      81 MG PO QDAY, #30 TAB.CHEW 0 Refills         Reported         17       Anastrozole (Arimidex*) 1 Mg Tablet      1 MG PO QDAY, #30 TAB         Reported         17       (vitamin b12)   No Conflict Check                Reported         4/26/17       Multivitamins W-Minerals/Lut (Daily Multi-Vitamins with Minerals) 1 Each Tablet      1 TAB PO QDAY for 30 Days, #30 TAB         Reported         4/26/17       Omeprazole (Omeprazole*) 20 Mg Tablet.dr      20 MG PO QDAY, #30 CAP 0 Refills         Reported         4/26/17       Oxybutynin Chloride (Oxybutynin Chloride) 5 Mg Tablet      5 MG PO QDAY, TAB         Reported         4/26/17       Simvastatin (Simvastatin*) 20 Mg Tablet      20 MG PO HS, #30 TAB 0 Refills         Reported         4/26/17       Hydrochlorothiazide (Hydrochlorothiazide*) 12.5 Mg Capsule      12.5 MG PO QDAY, #30 CAP 0 Refills         Reported         4/26/17       Docusate Sodium (Colace) 100 Mg Capsule      100 MG PO BID Y for CONSTIPATION, #60 CAP 0 Refills         Reported         4/26/17       Prochlorperazine Maleate (Prochlorperazine Maleate) 10 Mg Tab      10 MG PO Q6H Y for NAUSEA AND/OR VOMITING, #60 TAB         Reported         4/26/17       Ondansetron Hcl (Ondansetron*) 8 Mg Tablet      8 MG PO Q6H Y for NAUSEA, TAB 0 Refills         Reported         4/26/17       Diphenoxylate/Atropine (Lomotil) 1 Each Tablet      2.5 MG PO BID Y for DIARRHEA, TAB         Reported         4/26/17       Loperamide (Loperamide) 2 Mg Capsule      2 MG PO ASDIR, TAB         Reported         4/26/17       Loratadine (Claritin) 10 Mg Tablet      5 MG PO QDAY Y for ALLERGIES, #15 TAB 0 Refills         Reported         4/26/17       Acetaminophen* (Tylenol Extra Strength*) 500 Mg Tablet      500 MG PO BID Y for JOINT PAIN, #100 TAB 0 Refills         Reported         4/26/17       Calcium Carb/Vit D3 (Calcium Carb 600 + D) 1 Each Tablet      2400 MG PO QDAY, #60 TAB 0 Refills         Reported         10/17/16      Current Medications      Current Medications Reviewed 1/10/18            Pain and Fatigue Scales      Pain Assessment:           Experiencing any pain?:  No      Fatigue:           Experiencing any  fatigue?:  No            Chemo Status      On Oral Chemotherapy?:  Yes      Comment:        ARIMIDEX X 5 YEARS 5/23/17      Neratinib x 1 year (1/2018)            Other      Clinical Trial Participant?:  No            Past Medical History      Yes Hypertension, Yes Sleep apnea      Hematology/oncology:  REPORTS HX OF: Breast cancer      Previous Blood Transfusion:  Prev Blood Transfusion,how many?            Past Surgical History      REPORTS HX OF: Mastectomy, left, Other Past Surgical Hx            Family History      REPORTS HX OF: Blood Cancer (MOTHER), Eye Cancer (MOTHER), Leukemia (MOTHER),     Other Cancer History (BRAIN TUMOR- FATHER)            Social History      Yes      Retired - Nurse            Tobacco Use      Tobacco status:  Former smoker      Smoking packs/day:  1/4      Quit status:  Quit date established            Alcohol Use      Alcohol intake:  0-2 drinks per day            Substance Use      Substance use:  Denies use      Counseling given:  None            Past Oncology Illness History      CHIEF COMPLAINT:      LEFT BR CA ER+ HER 2 YOUNG +            Ms. Cindy Rosa is a very pleasant 71-year-old  female with a recently     diagnosed left-sided breast cancer. Initial breast biopsy was performed on 10/10    /2016 with biopsy of an area of concern of calcifications in the left breast.     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 She then underwent a needle     lobe lumpectomy on 10/25/2016. The lumpectomy revealed an incidental finding of     grade 3 invasive lobular carcinoma the left breast. Estrogen receptor +70%,     progesterone receptor -0%, HER-2/young was equivocal 2+ IHC, Ki-67 10%. 5     sentinel lymph nodes were removed and one was found to contain isolated tumor     cells. Multiple margins were positive which required a reexcision. The     reexcision was performed on 11/16/2016 and was performed via total mastectomy.     [place] lymph node evaluation was also  "performed. The size of the largest     invasive carcinoma was 9 mm but multiple foci of invasive carcinoma were noted.     Overall grade was again grade 3. All margins were uninvolved. Total lymph nodes     removed were 16 and unfortunately one of these was involved with macro     metastatic disease. This was staged as a pT1b pN1a stage IIA breast cancer.            She was then referred to my clinic and I saw her in the multidisciplinary     breast clinic on 12/01/2016. At that time she stated she was at her normal     baseline state of health. She did report some erythema at the left chest wall     mastectomy site. She did report a low-grade fever due to possible infection in     this area. On the date of first first clinic appointment with me on 12/01/2016     she had her surgical drain removed.            INTERIM HISTORY:       DATE: 3/20/17      She presented for C6 TCH+P chemotherapy.             She states her superficial skin infection has almost completely resolved. She     denies any fever. No nausea. No vomiting. No rash. No diarrhea.      Echo completed 12/08/16 with normal EF.  Tolerated cycle 1 well.  Has lost her     hair.  No other complaints.             Returned 1/12/17 for toxicity evaluation.  No f/c, no nausea or vomiting.  Her     left postmastectomy wound is being packed.  According to patient wound bed is     clean, with no slough or purulent drainage.  Patient reports \"Dr. Keita told     me it is okay to get my next cycle\"  Will given Neulasta with next cycle. She     experienced diarrhea and a faint rash post cycle one.  Using immodium which is     working.  Rash is resolving.  Will monitor both.              Returns for toxicity evaluation.  Reports \"I think I am tolerating chemo well\".     Diarrhea has sl. worsened per patient, would like to try Lomotil. Has improved.                  Still with left breast wound with dry packings per Dr. Keita. These were     reported to be markedly " "improved by her surgeon per the patient.            Has seen PCP and noticed \"freckles in oral cavity\", had never been told this     before. She was sent to ENT (Dr. Yu or Dr. Bliss) for evaluation and they     felt this was benign.             Feels well, tolerating chemotherapy really well.  Is still active, maintaining     weight.  Has 2-3 episodes of diarrhea daily previously however with Lomotil     keeps it under control. No fever. No infection.  ECHO ordered. Refilled Lomotil     and Potassium.             Starting single agent Herceptin.  Has appointment with Dr. Wild today.             Presented for single agent Herceptin on 5/23/17.  Has decided not to proceed     with radiation.  Will start antiestrogen oral pill.             Presented for Herceptin on 7/5/17.  Taking Arimidex. No side effects.  Next     ECHO due in July, ordered.  Ordered right screening mammo.            Presented for Herceptin on 7/25/17.  More energy \"starting to feel like old self    \".   ECHO 7/5/17 60%.  Mammo scheduled for 9/5/17.  Osteopenia of hip noted on     bone densitometry.  This is her baseline since starting Arimidex.  Taking     Calcium and Vit D.             Presented for Herceptin on 9/5/17. Tolerating Arimidex without problems.  More     energy.  Has mammogram ordered for next month.  No headaches, dizziness,     hoarseness, cough, SOA or pain.             Presented for Herceptin on 9/28/17.  More energy.  Back to doing all of her on     ADLs.  No headaches, cough, pain.             Presented for Herceptin on 10/16/17.  No fever, chills, headaches, peripheral     edema, cough or pain.             Presented for Herceptin on 11/7/17.  ECHO done ejection fraction 55%.  No fever    , chills, peripheral edema, chest pain or cough.              Interim history: 11/28/17      Presents on 11/28/17 for Herceptin.  Energy level has returned.  Eating well.      No fever, chills, headaches, SOA, cough or pedal edema.     "         Interim history: 17      Presents on 17 for Herceptin.  This is her last dose.  No fever, SOA,     cough, pedal edema or orthopnea.              Interim history: 1/10/18      Presents on 1/10/18 for 2 week follow up after completion of Herceptin.  She is     back to pretreatment ADLs.  No fever, chills, SOA, pedal edema.             OTHER:            Patient:  KIKA CHRISTIE   Acct:  # [id number]      UNIT #:[id number]      Admit/Service Date: 16      LOCATION:   [facility]  05        ATTENDING PHYS:Roxanne Keita      SEX:F      :1945            ****  [id number]: ***                                                  S-16-    [id number]      PATIENT       KIKA CHRISTIE      NAME:      MRN:          [id number]            FINAL PATHOLOGIC DIAGNOSIS:      SURGICAL PATHOLOGY CANCER CASE SUMMARY       - COMBINED DATA (PREVIOUS STEREOTACTIC CORE-NEEDLE BIOPSY, S-- AND     PREVIOUS         NEEDLE LOCALIZED LUMPECTOMY, S- AND CURRENT MASTECTOMY SPECIMEN)      INVASIVE CARCINOMA OF THE BREAST:  MASTECTOMY (TOTAL, WITH AXILLARY CONTENTS;     MODIFIED      RADICAL)            PROCEDURE       - TOTAL MASTECTOMY (INCLUDING NIPPLE AND SKIN)            LYMPH NODE SAMPLING       - SENTINEL LYMPH NODES (S-)       - AXILLARY DISSECTION (PARTIAL DISSECTION)            SPECIMEN LATERALITY       - LEFT            TUMOR SIZE:  SIZE OF LARGEST INVASIVE CARCINOMA       - GREATEST DIMENSION OF LARGEST FOCUS OF INVASION GREATER THAN 1 MM:  9 MM        - INVASIVE LOBULAR CARCINOMA (S-16-)            HISTOLOGIC GRADE (KELLEY HISTOLOGIC SCORE)       - OVERALL GRADE:  GRADE 3, SCORE OF 8 (3, 3 AND 2), S-16-            TUMOR FOCALITY       - MULTIPLE FOCI OF INVASIVE CARCINOMA (S-16-)            DUCTAL CARCINOMA IN SITU (DCIS)       - DCIS IS PRESENT (S-16)            LOBULAR CARCINOMA IN SITU (LCIS)       - PRESENT, MULTIFOCAL            MARGINS       - INVASIVE CARCINOMA         - MARGINS UNINVOLVED BY INVASIVE CARCINOMA (MASTECTOMY SPECIMEN)         - DISTANCE FROM CLOSEST MARGIN:  APPROXIMATELY 16 MM          - SPECIFY MARGIN:  POSTERIOR (The mastectomy margin is difficult to     determinate due      to a defect            posteriorly at the previous biopsy site.  Please see the gross     description for      more information.)          - NOTE:  No invasive carcinoma is found within the mastectomy specimen     breast      tissue.       - DCIS        - DCIS NOT PRESENT (MASTECTOMY SPECIMEN)            LYMPH NODES       - TOTAL NUMBER OF NODES EXAMINED (SENTINEL AND NON-SENTINEL):  16       - NUMBER OF SENTINEL LYMPH NODES EXAMINED:  5 ([id number])       - LYMPH NODE INVOLVEMENT        - NUMBER OF LYMPH NODES WITH MACROMETASTASIS (GREATER THAN 2 MM):  1        - NUMBER OF LYMPH NODES WITH MICROMETASTASIS:  0        - NUMBER OF LYMPH NODES WITH ISOLATED TUMOR CELLS:  1            PATHOLOGIC STAGING       - PRIMARY TUMOR        - pT1b:  TUMOR GREATER THAN 5 MM BUT LESS THAN OR EQUAL TO 10 MM IN GREATEST     DIMENSION                ([id number])       - REGIONAL LYMPH NODES        - pN1a:  METASTASIS IN 1-3 AXILLARY LYMPH NODES, AT LEAST 1 METASTASIS     GREATER THAN 2      MM            ANCILLARY STUDIES       - PLEASE SEE THE PREVIOUS BIOPSY REPORTS FOR THE BREAST TUMOR     IMMUNOHISTOCHEMICAL         PROFILE RESULTS            ADDITIONAL PATHOLOGIC FINDINGS:      BREAST, LEFT, MODIFIED RADICAL MASTECTOMY       - LOBULAR CARCINOMA IN SITU, INVOLVING NIPPLE, CONSTANTINE PREVIOUS BIOPSY SITE     CAVITY AND      LOWER         OUTER QUADRANT       - ATYPICAL DUCTAL EPITHELIAL HYPERPLASIA       - FLORID DUCTAL EPITHELIAL HYPERPLASIA       - RADIAL SCAR WITH ASSOCIATED DUCTAL PAPILLOMATOSIS AND FLORID DUCTAL     EPITHELIAL         HYPERPLASIA       - DUCT ECTASIA       - APOCRINE METAPLASIA       - APOCRINE CYSTS       - MICROCALCIFICATIONS       - SCLEROSING ADENOSIS       - STROMAL FIBROSIS        - BIOPSY SITE CHANGES        - STATUS POST STEREOTACTIC CORE-NEEDLE BIOPSY, [id number] AND WIRE-GUIDED     LUMPECTOMY,          [id number]             - SKIN WITH        - SEBORRHEIC KERATOSES        - INTRADERMAL NEVUS            LYMPH NODES, AXILLARY, LEFT, MODIFIED RADIAL MASTECTOMY       - METASTATIC CARCINOMA (1/11)            SMV:LAD            MICROSCOPIC DESCRIPTION: Microscopic examination performed.            CLINICAL INFORMATION:      Pre-Op Dx:  Left breast cancer.            SPECIMEN(S) SUBMITTED:     MASTECTOMY, MODIFIED RADICAL, 16:00            GROSS DESCRIPTION:      Removed from body:  1527; Placed in Formalin:  1600      Formalin/Shelley, Cindy and LEFT BREAST/contents:  a 1,056 gram left modified     radical      mastectomy, 18.8 cm medial to lateral, 16.2 cm superior to inferior, and 7.5 cm     anterior      to posterior with a scant amount of pink muscle on the posterior aspect, a     short suture      designating superior and a long suture designating lateral.  Also present is an     attached      12.5 x 6.5 x 3.0 cm portion of axillary tail.  A 23.5 x 9.7 cm ellipse of tan-    pink skin      is present on the anterior aspect, with a 4.8 x 4.8 cm areola and a 1.6 x 1.6     cm everted      nipple.  The skin surface also displays a 4.0 x 0.1 cm moderately well healed     scar which      involves the inferior medial skin margin, and multiple tan-pink papules on the     medial      aspect, 0.3 x 0.2 cm to 0.5 x 0.4 cm, the nearest of which is 0.6 cm from the     inferior      medial skin margin.  The anterior aspect of the specimen is inked blue and the     posterior      aspect is inked black.  A 0.8 x 0.7 cm defect is present on the posterior aspect    ,      revealing the underlying lumpectomy cavity.  The specimen is serially sectioned     from      medial to lateral to reveal that the lumpectomy cavity is approximately 11.5 x     8.5 x 4.0      cm and involves the posterior margin.  The wall  is tan-pink, lobulated and     smooth to      hemorrhagic and shaggy with diffuse fat necrosis.  The remaining breast tissue     is 60%      tan-yellow, lobulated adipose tissue and 40% white fibrous tissue with focal     cystic      fibrous tissue and no obvious masses.  Multiple possible lymph nodes are found     in the      axillary tail, 0.3 x 0.3 x 0.2 cm to 1.8 x 1.2 x 1.0 cm.  Representative     sections are      submitted as follows:  1 - muscle, en face; 2 - nipple base, en face; 3 -     nipple apex,      quadrisected; 4 - nonmarginal skin, to include scar and papules; 5-7 - superior     margin      of lumpectomy cavity, two sections in each, submitted medial to lateral; 8-10 -     inferior      margin of previous lumpectomy cavity, two sections in each, submitted medial to     lateral;      11-12 - posterior medial margin of previous lumpectomy cavity; 13 - involved     posterior      margin with defect, three sections; 14-15 - anterior margin of previous     lumpectomy      cavity, two sections in each, submitted medial to lateral; 16 - far medial     margin of      lumpectomy bed, two sections; 17 - far lateral margin of previous lumpectomy bed    , two      sections; 18 - upper outer quadrant; 19 - lower outer quadrant; 20 - upper inner      quadrant; 21 - lower inner quadrant; 22-23 - multiple possible lymph nodes; 24     - one      possible lymph node, trisected; 25 - one possible lymph node, possibly     representing a      matted aggregate, bisected.     JLW:LAD                                                             <Sign Out Dr. Magaña>                                                       RICHARD MCLEOD M.D.,     PATHOLOGIST            FINAL SURGICAL PATHOLOGY REPORT           [facility]     Page 1     of 1            Accesioned Date and Time: 11/15/16 0655            OTHER:            Result Comment:            ERBB2 (HER2) by FISH                      See Note       ----------------------------------------------------------      ORIGINAL RESULT - FDA approved test:      Equivocal for HER2 (ERBB2) Amplification      FINAL RESULT - HER2/RAI1 Reflex test:      Positive for HER2 (ERBB2) Amplification Using the Reflex      FISH Assay      FDA-APPROVED PROBE SET:      HER2/CEP17 Ratio: 1.3      Average HER2 Signal Number per Cell: 4.4      Average CEP17 Signal Number per Cell: 3.4      Number of Cells Scored: 40      Number of Observers: 2      Scoring Method: Manual      REFLEX FISH PROBE SET:      HER2/RAI1 Ratio: 2.1      Average HER2 Signal Number per Cell: 4.3      Average RAI1 Signal Number per Cell: 2.0      Number of Cells Scored: 40      Number of Observers: 2      Scoring Method: Manual      ----------------------------------------------------------      COMMENT: The HER2/RAI1 reflex test result is provided in an      attempt to resolve HER2 status on this case. Clinical      treatment decisions can be based upon either the FDA      approved test or the reflex test as deemed appropriate.      Results obtained on a limited sampling of the tumor mass      (e.g. needle core biopsy) may be inadequate in some cases,      and ASCO/CAP updated (2013) guidelines recommend repeat      testing on the resection specimen for biopsies that are      equivocal by immunohistochemistry and FISH.      This result has been reviewed and approved by Giselle Roberts D.O. Controls were run and performed as      expected.      (electronic signature)      METHODOLOGY AND INTERPRETIVE DATA:      Fluorescence in situ hybridization (FISH) analysis for      ERBB2 (HER2) gene amplification was performed on a section      from a paraffin embedded tissue block using the [employer] HER2      IQFISH PharmDx DNA Probe kit. Based on the initial      equivocal result, additional FISH analysis was performed      using [employer] probes targeting ERBB2 and RAI1 (17p11.2).      Cells were evaluated from  regions of tumor identified on      histopathologic review of a matching hematoxylin and eosin      stained section. Controls performed appropriately.      The HER2 IQFISH test was interpreted according to the      American Society of Clinical Oncology / College of American      Pathologists (ASCO/CAP) 2013 Guideline Recommendations for      breast cancer fixed in formalin for 6-72 hours. According      to the 2013 ASCO/CAP Guidelines, a non-amplified result      indicates an ERBB2/CEP17 ratio less than 2.0 with an      average ERBB2 copy number less than 4.0; an amplified      result indicates either (1) an ERBB2/CEP17 ratio of 2.0 or      greater or (2) an average ERBB2 copy number of at least 6.0      with an ERBB2/CEP17 ratio less than 2.0; and an equivocal      result indicates an ERBB2/CEP17 ratio less than 2.0 with an      average ERBB2 copy number of at least 4.0 but less than      6.0. Genetic heterogeneity is reported when there is an      aggregate population of amplified cells comprising >10% of      the tumor cell population on a slide. Cases with      amplification of 50% or more of the tumor population are      considered non-heterogeneous.      The IQFISH PharmDx test is FDA approved for the evaluation      of ERBB2 (Her2) gene amplification in formalin fixed,      paraffin embedded breast and gastric tumors. This test has      been validated by [employer] for use in tumors other      than breast or gastric cancer as a laboratory developed      test utilizing analyte specific reagents (ASRs). Test      developed and characteristics determined by [employer].      Because the result of the initial FISH analysis with the      HER2 IQFISH test was equivocal, reflex testing was      performed using [employer] probes targeting HER2 (ERBB2) and a      different control locus on the short arm of chromosome 17,      RAI1 (17p11.2). The result of the reflex FISH assay is      interpreted using  the HER2/RAI1 ratio. According to the      preclinical validation, if the RAI1 average/cell is less      than 1.5, and 17 or more cells out of 40 have 1 green      signal, RAI1 is considered to be deleted and additional      testing is necessary to resolve the patients HER2 status.      Gain of RAI1 (RAI1 average/cell greater than 2.0, according      to the preclinical validation) may represent      co-amplification along with the chromosome 17 centromere      and the ERBB2 gene, but such gains encompassing regions of      17q, the centromere, and 17p are far more likely to      represent polysomy or gain of the majority of chromosome 17      rather than amplification of discrete genomic segments.      Therefore, no further testing is performed on cases with      gain of RAI1, and the resulting HER2/RAI1 ratio is reported      as the final result of reflex testing.      The [employer] HER2 (ERBB2) and RAI1 probe combination was      validated by [facility] as a laboratory developed      test utilizing analyte specific reagents (ASRs). Test      developed and characteristics determined by [facility]. See Compliance Statement A: [employer]/CS.      Performed by [facility],      [address] [place] [id number] [phone]      [email or web address], Sanjeev Mccurdy MD - Lab. Director            ROS      General:  Denies: Appetite change, Fatigue      Eyes:  Denies: Blurred vision, Corrective lenses      Ears, nose, mouth, throat:  Denies: Headache      Cardiovascular:  Denies: Chest pain      Respiratory:  Denies: Shortness of Air      Gastrointestinal:  Complains of: Diarrhea (OFF AND ON), Denies: Nausea,     Constipation      Kidney/Bladder:  Denies: Painful Urination, Change in urinary stream      Musculoskeletal:  Denies: New Back pain, Muscle weakness      Skin:  DENIES: Jaundice, Nail changes, Rash      Neurological:  Denies: Dizziness, Numbness\Tingling      Psychiatric:  Complains of: AAO X  3, Denies: Anxiety      Endocrine:  DiabetesThyroid Disorder      Hematologic/lymphatic:  Denies: Bruising, Bleeding, Enlarged Lymph Nodes      Reproductive:  Complains of Menopause, Denies Pregnant            Vitals            Vital Signs              Date Time  Temp Pulse Resp B/P (MAP) Pulse Ox O2 Delivery O2 Flow Rate FiO2             12/19/17 10:23 97.8 73 20 154/78 100 ROOM AIR              General Appearance:  Alert, Oriented X3, Cooperative, No acute distress      Eyes:  Anicteric Sclerae, Moist Conjunctiva, PERRLA      HEENT:  Orophraynx clear, No Erythema, No Exudates, No Pallor, No Thrush      Neck:  Supple, Full ROM, No Carotid Bruits      Respiratory:  CTAB, No Diminished Breath, No Crackels      Abdomen\Gastro:  Soft, No Masses, No Hepatosplenomegaly      Cardio:  RRR, No Murmur, No, Peripheral Edema, Normal PMI      Skin:  Normal Temperature, Normal Tone, No Rash, lesions, ulcers      Psychiatric:  Appropriate Affect, Intact Judgement, AAO x3      Neuro:  Cranial Nerve II-XII Inta, No Focal Sensory Deficit      Muscularskeletal:  Normal Gait and Station, Full ROM of extremeties      Extremities:  No Digital Cyanosis, No Digital Ischemia, Normal Gait and station    , No Weakness      Lymphatic:  No Cervical, No Supraclavicular, No Infraclavicular, No Axillary            Lab Results      Laboratory Tests      12/18/17 10:50            Radiology Results      n/a            Current Plan      I reviewed the results of her previous imaging studies which revealed a highly     concerning area of calcifications in the left breast. I reviewed her initial     lumpectomy and subsequent mastectomy pathology. I reviewed the natural history     of HER-2 positive breast cancer. I also reviewed the NCCN Invasive Breast     Cancer Version 2.2016 Guidelines.            I reviewed for invasive lobular carcinoma which had proven silvina involvement     the guideline recommendation is a category 1 recommendation for adjuvant      trastuzumab-based chemotherapy followed by endocrine therapy. I reviewed with     her that there are currently 2 guideline and recommended regimens for HER-2     positive disease. We discussed the various regimens and I recommended TCH plus     Pertuzumab in this setting. I reviewed the risks and potential complications of     chemotherapy including the increased risk of death during treatment and she     voiced understanding of this and was in agreement. In preparation for treatment     she will require an echocardiogram prior to Herceptin based treatment. She will     also require placement of a venous port cath for IV access.            As noted she underwent her mastectomy approximately 2 weeks prior to her     initial appointment on 11/16/2016. I gave her at least 4 weeks to heal     especially given the superficial skin infection. Healing.             She returns for C6 TCH+P chemotherapy and has had minimal side effects.      Toxicity monitoring. Labs and chemotherapy reviewed. Echocardiogram normal. She     will require endocrine therapy and possible radiation given her positive silvina     status after completion of 6 cycles.            She also was noted to have anemia and I performed an anemia workup. She states     this is due to bleeding and oozing post-op which is quite possible. Her anemia     appeared to have markedly improved. Stable.            The skin infection (postmastectomy left wound) was previously being packed.     Continues to follow with Dr. Keita who states this is much improved.             Diarrhea after cycle one controlled Lomotil. Will monitor. Improved.             Rash after cycle one. Resolved.            Noted with hypokalemia, will give 40 meq today and recheck at 7-10 day follow     up. Potassium improved today 3.3, will give 20 meq KCL. Potassium level 2.9 at     C4, will give 40 meq KCL today, and start daily 20 meq Kdur.   Repeat labs     pending.            C6 TCH-P  completed on 04/11/17.            Refer to ENT for evaluation of discolorations in oral palate. This was     performed and was benign per ENT.            Calcium low, albumin normal, will send ionized calcium.  Taking Calcium + D     daily.  Will monitor.             Will see her back in 2 weeks for toxicity evaluation.  Discussed will continue     Herceptin x 1 year.  ECHO 55%..  Will need endocrine therapy and possible     radiation.  Will refer to Dr. Wild.             She still has not seen Dr. Wild and we sent referral again.  I told her it     would be his discretion regarding radiation treatment.  She is doing well now,     said the last treatment was the hardest for her to recover.  If she does not     require radiation treatment she will be started on endocrine therapy. Otherwise     I will start endocrine therapy after XRT.            She is having quite a few dental issues after eating a lobster. I told her     after next week she can pursue any dental treatment she needs.            Potassium 2.9 increased Kdur 20 meq to twice daily.             Arimidex daily x 5 years (5/2022).  Side effects discussed.  Bone densitometry     ordered.  Herceptin x 1 full year.  Echo done 4/5/17.  Potassium 3.6 continue     KCL 20 meq daily defer further to PCP.             Herceptin today (8/15/17).  ECHO 7/5/17 60%.  Right screening mammo ordered for     9/5/17.  Osteopenia to hip on bone densitometry. Continue Vit D and Calcium.             Received Herceptin on 9/28/17.  Labs reviewed. Toxicity monitoring. Keep     appointment for mammogram.  Continue Arimidex.  Encouraged exercise.              Received Herceptin on 10/16/17.  Labs reviewed. Mammogram benign.  Toxicity     monitoring.  Repeat ECHO in 4 weeks.  Continue Arimidex.             Received Herceptin on 11/7/17.  Labs reviewed.  ECHO 55%.  No toxicities noted.             Current plan: 11/28/17       Proceed with Herceptin.  Will continue Arimidex.   Labs reviewed.  Toxicity     monitoring.              Current Plan: 12/19/17      Proceed with last dose of Herceptin.  Will discuss Neratinib at 3-4 week follow     up.  Will continue Arimidex for a total of 5 years.  Labs reviewed.              Current Plan: 1/10/18      Has returned to pretreatment ADLs.  No remaining toxicities noted. Discussed     Neratinib and rationale.  Nerlynx is an oral extended adjuvant therapy intended     for use after Herceptin.  It also targets Her2 cancer cells.  1 in 4 early     stage Her2+ breast cancer patients experience recurrence after Herceptin.      Nerlynx decrease this risk.  Side effects discussed.  Diarrhea seen in 30-40%     of patients that use Nerlynx.  Will prophylactically treat to prevent.  Imodium     works better for her and she would just rather use it versus Lomotil.  She will     take 2 imodium tablets three times per day and will increase or decrease     depending on side effect.  Referral for colonoscopy screening given.  Will     continue Arimidex.  Mammo and bone densitometry up to date. She will follow up     in one month to evaluate for toxicities.  She will follow up in 3 months for     her Survivorship appointment.               She was given time to ask questions and these questions were answered. She had     no additional questions or concerns and all questions were answered to her     satisfaction.            Available for immediate release. Please forward a copy of this dictation to Dr. Roxanne Keita and [facility] and Dr. Reji Wild.            TREATMENT SUMMARY:      1. DIAGNOSED       2. TCH+P chemotherapy; C6, COMPLETED 4/11/17      3. DECLINED RADIATION      4. ARIMIDEX 1 MG DAILY X 5 YEARS.       5. 9/5/17 - mammo right benig      6. 12/19/17 - Herceptin completed.      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            Loose stools - R19.5            Notes      New Medications      * Neratinib Maleate (Nerlynx) 40 MG TABLET: 240 MG PO QDAY 30 Days #180      New Referrals      * Gastroenterology, As Soon As Possible       Jeb Flores       COLONOSCOPY       Reason for Referral: COLONOSCOPY       Dx: Screening due      Follow-up:  1 Month      Next Visit Labs:  CBC, CMP      Intensive Monitor for Toxicity:  Labs Reviewed, Chemo Orders Reviewd, Meds\    Narcotics Reviewed      Labs Reviewed During Visit?:  Yes      Review/Other:  Received Lab Test, Reviewed Medicine Test, Ordered Medicine Test     (nerlyx), Other (referral for colonosocpy. )      ECOG Score:  0      Clinical Staging       pT1b pN1a cM0 Stage IIA      Attending      See the above note for details. I saw and evaluated the patient and agree with     the NP's findings and plan as documented. I reviewed the review of systems and     past histories. I reviewed the patient's case and plan with the NP as noted.     She presents after completion of one year of Herceptin chemotherapy. We had     discussed with her the possibility of adjuvant Nerlynx and she was agreeable.     This medication was written at this appointment and we discussed the risks and     potential side effects as well as benefits of this treatment. She voiced     understanding of this and was in agreement. We will see her back as noted above.            Hx Influenza Vaccination:  No      Influenza Vaccine Declined:  Yes      2 or More Falls Past Year?:  No      Fall Past Year with Injury?:  No      Hx Pneumococcal Vaccination:  No      Encouraged to follow-up with:  PCP regarding preventative exams.      Chart initiated by      ZAMZAM LEMONS                 Disclaimer:  Converted document may not contain table formatting or lab diagrams. Please see [employer] [facility] for the authenticated document.

## 2021-05-28 NOTE — PROGRESS NOTES
Patient: KIKA CHRISTIE     Acct: [id number]     Report: #[id number]  UNIT #: [id number]     : 1945    Encounter Date:2020  PRIMARY CARE: JHONATAN TAN  ***Signed***  --------------------------------------------------------------------------------------------------------------------  NURSE INTAKE      Visit Type      Established Patient Visit            Chief Complaint      MAMMO RESULTS            Referring Provider/Copies To      Referring Provider:  XU BURRIS      Primary Care Provider:  XU BURRIS      Copies To:   XU BURRIS            History and Present Illness      Past Oncology Illness History      Ms. Christie is a very pleasant 72-year-old  female with a recently     diagnosed left-sided breast cancer. Initial breast biopsy was performed on     10/10/2016 with biopsy of an area of concern of calcifications in the left     breast. 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 She then underwent a     needle lobe lumpectomy on 10/25/2016. The lumpectomy revealed an incidental     finding of grade 3 invasive lobular carcinoma the left breast. Estrogen receptor    +70%, progesterone receptor -0%, HER-2/young was equivocal 2+ IHC, Ki-67 10%. 5     sentinel lymph nodes were removed and one was found to contain isolated tumor     cells. Multiple margins were positive which required a reexcision. The     reexcision was performed on 2016 and was performed via total mastectomy.     [place] lymph node evaluation was also performed. The size of the largest     invasive carcinoma was 9 mm but multiple foci of invasive carcinoma were noted.     Overall grade was again grade 3. All margins were uninvolved. Total lymph nodes     removed were 16 and unfortunately one of these was involved with macro     metastatic disease. This was staged as a pT1b pN1a stage IIA breast cancer.            HPI - Oncology Interim      Patient returns for follow-up and ongoing treatment of her breast cancer.  She      is status post treatments as outlined including HER-2 directed therapy.  She is     now on adjuvant anastrozole for ER/NE positivity.  She began Arimidex 5/17.  She    is taking her medication every day.  She reports some occasional achiness in the    muscles and joints but nothing that is terribly bothersome to her.  She denies     any masses lymphadenopathy.  No unusual aches or pains.  She denies any issues     with her left mastectomy site or remaining right breast.  She is up-to-date on     mammogram.  She is eating drinking well, her weight is maintained.  She is not     exercising as much as she would like.            Cancer Details            Left breast with multifocal invasive lobular carcinoma intermediate grade      ER/NE+ HER2+ Ki 67 25%            Clinical Staging      Stage IIA (T1b N1aM0)            Treatments      Chemotherapy      12/28/16 thru 4/11/17 completed 6C TCHP chemo; 12/19/17 completed 1yr Herceptin     therapy;  5/23/17 Arimidex initiated;   3/1/18 Nerlynx 240mg prescribed      Radiation Therapy      5/2/17 declined radiation therapy      Surgeries      11/16/2016 Left Mastectomy            Clinical Trial Participant      No            ECOG Performance Status      0            PAST, FAMILY   Past Medical History      Past Medical History:  Hepatitis, High Cholesterol, Hypertension      Other PMH:        NEUROPATHY      Hematology/Oncology (F):  Breast Cancer            Past Surgical History      Biopsy (left breast), Fracture Repair (wrist right/R ANKLE), Lumpectomy (left),     Mastectomy Left, VAD Placement (and removal)            Family History      Family History:  Leukemia (mother), Melanoma (mother), Neurologic Cancer     (father)            Social History      Marital Status:        Lives independently:  Yes      Number of Children:  4      Occupation:  Retired - Nurse            Tobacco Use      Tobacco status:  Former smoker      Smoking packs/day:  1/4      Quit  status:  Quit date established            Alcohol Use      Alcohol intake:  0-2 drinks per day            Substance Use      Substance use:  Denies use            REVIEW OF SYSTEMS      General:  Admits: Fatigue, Weight Gain      Eye:  Admits Corrective Lenses; Denies Vision Changes      Respiratory:  Denies: Productive Cough, Shortness of Air      Musculoskeletal:  Denies Muscle Pain; Admits Painful Joints      Integumentary:  Denies Rash      Neurologic:  Denies Dizziness            VITAL SIGNS AND SCORES      Vitals      Weight 238 lbs 1.549 oz / 108 kg      Temperature 97.6 F / 36.44 C - Temporal      Pulse 81      Respirations 18      Blood Pressure 157/75 Sitting, Right Arm      Pulse Oximetry 95%, RM AIR            Pain Score      Pain Scale Used:  Numerical      Pain Intensity:  0            Fatigue Score      Fatigue (0-10 scale):  1            EXAM      General Appearance:  Positive for: Alert, Oriented x3, Cooperative;          Negative for: Acute Distress      Eye:  Positive for: Anicteric Sclerae, Moist Conjunctiva      Neck:  Positive for: Supple;          Negative for: JVD, Masses      Respiratory:  Positive for: CTAB, Normal Respiratory Effort      Breast - Left:  Positive for: Appearance (Well-healed mastectomy incision     without evidence of intra-scar recurrence, masses or axillary adenopathy);          Negative for: Adenopathy      Breast - Right:  Positive for: Appearance (Normal-appearing female breast);          Negative for: Adenopathy      Abdomen/Gastro:  Positive for: Normal Active Bowel Sounds, Soft;          Negative for: Distention, Hepatosplenomegaly, Tenderness      Cardiovascular:  Positive for: RRR;          Negative for: Gallop, Murmur, Peripheral Edema, Rub      Psychiatric:  Positive for: Appropriate Affect, Intact Judgement      Lymphatic:  Negative for: Axillary, Cervical, Infraclavicular, Supraclavicular            PREVENTION      Hx Influenza Vaccination:  No      Influenza  Vaccine Declined:  Yes      2 or More Falls Past Year?:  No      Fall Past Year with Injury?:  No      Encouraged to follow-up with:  PCP regarding preventative exams.      Chart initiated by      ANTONIO MAYORGA MA            ALLERGY/MEDS      Allergies      Coded Allergies:             NO KNOWN ALLERGIES (Unverified , 2/25/20)            Medications      Last Reconciled on 2/25/20 11:47 by ARIANA CHOWDARY      Hctz (hydroCHLOROthiazide) 12.5 Mg Capsule      25 MG PO QDAY, #60 CAP 0 Refills         Reported         2/25/20       Simvastatin (Simvastatin*) 20 Mg Tablet      40 MG PO HS, #60 TAB 0 Refills         Reported         2/25/20       Anastrozole (Anastrozole) 1 Mg Tablet      1 MG PO QDAY, #30 TAB 9 Refills         Prov: KIRSTIN GRIJALVA         7/31/19       HYDROcodone-Acetaminophen 5-325 Mg (Norco 5-325 Mg) 1 Tab Tab      2 TAB PO Q4H PRN for pain, #30 TAB 0 Refills         Prov: JELENA CORTES MD         11/20/18       Cyanocobalamin (Vitamin B-12*) 2,500 Mcg Tab.subl      2500 MCG PO QDAY, #30 TAB.SL         Reported         11/15/18       Neratinib Maleate (Nerlynx) 40 Mg Tablet      240 MG PO QDAY for 30 Days, #180 TAB         Reported         4/10/18       Cholecalciferol (Vitamin D3) (Vitamin D3) 2,000 Unit Cap      2000 UNITS PO QDAY, #30 CAP 0 Refills         Reported         2/7/18       Calcium Carb/Vit D3 (CALCIUM 600-VIT D3 400 TABLET) 1 Each Tablet      1 EACH PO QDAY, #60 TAB 0 Refills         Reported         2/7/18       Metoprolol Succinate (Metoprolol Succinate) 50 Mg Tab.er.24h      50 MG PO QDAY, #30 TAB.SR.24H 0 Refills         Reported         2/7/18       Melatonin (Melatonin) 10 Mg Tablet      10 MG PO HS, TAB         Reported         9/5/17       Aspirin Chew (Aspirin Baby) 81 Mg Tab.chew      81 MG PO QDAY, #30 TAB.CHEW 0 Refills         Reported         7/5/17       Multivitamins W-Minerals/Lut (Daily Multi-Vitamins with Minerals) 1 Each Tablet      1 TAB PO QDAY for 30 Days,  #30 TAB         Reported         4/26/17       Omeprazole (Omeprazole*) 20 Mg Tablet.dr      20 MG PO QDAY, #30 CAP 0 Refills         Reported         4/26/17       Oxybutynin Chloride (Oxybutynin Chloride) 5 Mg Tablet      5 MG PO QDAY, TAB         Reported         4/26/17       Hctz (hydroCHLOROthiazide) 12.5 Mg Capsule      12.5 MG PO QDAY, #30 CAP 0 Refills         Reported         4/26/17       Ondansetron Hcl (ONDANSETRON HCL) 8 Mg Tablet      8 MG PO Q6H PRN for NAUSEA, TAB 0 Refills         Reported         4/26/17       Loratadine (Claritin) 10 Mg Tablet      5 MG PO QDAY PRN for ALLERGIES, #15 TAB 0 Refills         Reported         4/26/17       Acetaminophen Es (Tylenol Extra Strength) 500 Mg Tablet      500 MG PO BID PRN for JOINT PAIN, #100 TAB 0 Refills         Reported         4/26/17      Medications Reviewed:  Changes made to meds            IMPRESSION/PLAN      Diagnosis      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  ER/MI positive, HER-2 positive.      Patient is now approaching 3 years into her adjuvant aromatase inhibitor therapy    with anastrozole 1 mg daily.  Tolerating well.  I see no evidence of disease     recurrence per history, physical examination.  She is up-to-date on mammogram     with a next being due in September of this year.  Order provided for her     mammogram.  We discussed low-fat low-cholesterol diet and routine aerobic     exercise as lifestyle modifications to decrease the risk of breast cancer     recurrence.  I will see her back in September with mammogram prior.            Notes      New Medications      * Simvastatin (Simvastatin*) 20 MG TABLET: 40 MG PO HS #60      * HCTZ (hydroCHLOROthiazide) 12.5 MG CAPSULE: 25 MG PO QDAY #60      New Diagnostics      * Screening Mammo, 09/10/20         Dx: HER2-positive carcinoma of left breast - C50.912            Patient Education            Aerobic Exercise       Breast Self-exam (BSE)      Patient Education Provided:  Yes            Electronically signed by ARIANA CHOWDARY  02/25/2020 11:48       Disclaimer: Converted document may not contain table formatting or lab diagrams. Please see [employer] [facility] for the authenticated document.

## 2021-05-28 NOTE — PROGRESS NOTES
Patient: KIKA CHRISTIE     Acct: [id number]     Report: #[id number]  UNIT #: [id number]     : 1945    Encounter Date:2019  PRIMARY CARE: JHONATAN TAN  ***Signed***  --------------------------------------------------------------------------------------------------------------------  NURSE INTAKE      Visit Type      Established Patient Visit            Chief Complaint      L BREAST CA      Intent of Therapy:  Curative            Referring Provider/Copies To      Referring Provider:  XU BURRIS      Primary Care Provider:  XU BURRIS            History and Present Illness      Past Oncology Illness History      Ms. Christie is a very pleasant 72-year-old  female with a recently     diagnosed left-sided breast cancer. Initial breast biopsy was performed on     10/10/2016 with biopsy of an area of concern of calcifications in the left     breast. 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 She then underwent a     needle lobe lumpectomy on 10/25/2016. The lumpectomy revealed an incidental     finding of grade 3 invasive lobular carcinoma the left breast. Estrogen receptor    +70%, progesterone receptor -0%, HER-2/young was equivocal 2+ IHC, Ki-67 10%. 5     sentinel lymph nodes were removed and one was found to contain isolated tumor     cells. Multiple margins were positive which required a reexcision. The     reexcision was performed on 2016 and was performed via total mastectomy.     Eunice lymph node evaluation was also performed. The size of the largest     invasive carcinoma was 9 mm but multiple foci of invasive carcinoma were noted.     Overall grade was again grade 3. All margins were uninvolved. Total lymph nodes     removed were 16 and unfortunately one of these was involved with macro     metastatic disease. This was staged as a pT1b pN1a stage IIA breast cancer.            HPI - Oncology Interim      F/u left breast cancer--reports has Nerlynx to last thru March--will finish      prescription. She is tolerating oral meds without issues.  She will be due for     bone density after 6/14/19--she is about to travel to [facility]--received Hep A     and Typhoid vaccinations. She denies chest skin changes or issues with right     breast/nipples.  Mammogram upd thru 9/2018.  Denies fever, lymphadenopathy or     distress at this time.            Cancer Details            Left breast with multifocal invasive lobular carcinoma intermediate grade      ER/MO+ HER2+ Ki 67 25%            Clinical Staging      Stage IIA (T1b N1aM0)            Treatments      Chemotherapy      12/28/16 thru 4/11/17 completed 6C TCHP chemo; 12/19/17 completed 1yr Herceptin     therapy;  5/23/17 Arimidex initiated;   3/1/18 Nerlynx 240mg prescribed      Radiation Therapy      5/2/17 declined radiation therapy            Clinical Trial Participant      No            ECOG Performance Status      0            PAST, FAMILY   Past Medical History      Past Medical History:  Hepatitis, High Cholesterol, Hypertension      Hematology/Oncology (F):  Breast Cancer            Past Surgical History      Biopsy (left breast), Fracture Repair (wrist right), Lumpectomy (left),     Mastectomy Left, VAD Placement (and removal)            Family History      Family History:  Leukemia (mother), Melanoma (mother), Neurologic Cancer     (father)            Social History      Marital Status:        Lives independently:  Yes      Number of Children:  4      Occupation:  Retired - Nurse            Tobacco Use      Tobacco status:  Former smoker      Smoking packs/day:  1/4      Quit status:  Quit date established            Alcohol Use      Alcohol intake:  0-2 drinks per day            Substance Use      Substance use:  Denies use            REVIEW OF SYSTEMS      General:  Denies: Appetite Change, Fatigue, Fever, Night Sweats, Weight Gain,     Weight Loss      Eye:  Denies: Blurred Vision, Corrective Lenses, Diplopia, Eye Irritation, Eye      Pain, Eye Redness, Spots in Vision, Vision Loss      ENT:  Denies: Headache, Hearing Loss, Hoarseness, Seizures, Sinus Congestion,     Sore Throat      Cardiovascular:  Denies: Chest Pain, Edema Ankles, Edema Legs, Irregular     Heartbeat, Palpitations      Respiratory:  Denies: Coughing Blood, Productive Cough, Shortness of Air,     Wheezing      Gastrointestinal:  Denies: Bloody Stools, Constipation, Diarrhea, Frequent     Heartburn, Nausea, Problem Swallowing, Tarry Stools, Unable to Control Bowels,     Vomiting      Genitourinary (female):  Denies: Blood in Urine, Decrease Urine Stream, Frequent    Urination, Incontinence, Painful Urination      Musculoskeletal:  Denies: Back Pain, Leg Cramps, Muscle Pain, Muscle Weakness,     Painful Joints, Swollen Joints      Integumentary:  Denies: Bleeds Easily, Bruises Easily, Hair Changes, Jaundice,     Lesions, Mole Changes, Nail Changes, Pigment Changes, Rash, Skin Discoloration      Neurologic:  Denies: Dizziness, Fainting, Numbness\Tingling, Paralysis, Seizures      Psychiatric:  Denies: Anxiety, Confused, Depression, Disoriented, Memory Loss      Endocrine:  Denies: Cold Intolerance, Diabetes, Excessive Sweating, Excessive     Thirst, Excessive Urination, Heat Intolerance, Flushing, Hyperthyroidism,     Hypothyroidism      Hematologic/Lymphatic:  Denies: Bruising, Bleeding, Enlarged Lymph Nodes,     Recurrent Infections, Transfusions            VITAL SIGNS AND SCORES      Vitals      Height 5 ft 5.08 in / 165.3 cm      Weight 229 lbs 7.982 oz / 104.1 kg      BSA 2.10 m2      BMI 38.1 kg/m2      Temperature 98.2 F / 36.78 C - Temporal      Pulse 68      Respirations 16      Blood Pressure 137/68 Sitting, Left Arm      Pulse Oximetry 97%, RM AIR            Pain Score      Pain Scale Used:  Numerical      Pain Intensity:  0            Fatigue Score      Fatigue (0-10 scale):  0 (none)            EXAM      General Appearance:  Positive for: Alert, Oriented x3,  Cooperative;          Negative for: Acute Distress      Neck:  Positive for: Supple;          Negative for: JVD, Masses      Respiratory:  Positive for: CTAB, Normal Respiratory Effort      Breast - Left:  Positive for: Appearance (Well-healed mastectomy incision);          Negative for: Adenopathy      Breast - Right:  Positive for: Appearance (Normal-appearing female breast);          Negative for: Adenopathy, Discharge, Mass, Skin Changes      Abdomen/Gastro:  Positive for: Normal Active Bowel Sounds, Soft;          Negative for: Distention, Hepatosplenomegaly, Tenderness      Cardiovascular:  Positive for: RRR;          Negative for: Gallop, Murmur, Peripheral Edema, Rub      Psychiatric:  Positive for: Appropriate Affect, Intact Judgement      Lymphatic:  Negative for: Axillary, Cervical, Infraclavicular, Supraclavicular            PREVENTION      Hx Influenza Vaccination:  No      Influenza Vaccine Declined:  Yes      2 or More Falls Past Year?:  No      Fall Past Year with Injury?:  No      Encouraged to follow-up with:  PCP regarding preventative exams.      Chart initiated by      ANTONIO MAYORGA MA            ALLERGY/MEDS      Allergies      Coded Allergies:             NO KNOWN ALLERGIES (Unverified , 2/25/19)            Medications      Last Reconciled on 2/25/19 15:56 by BRYANT MENDOZA      Hydrocodone/Acetaminophen 5/325 MG (Norco 5/325 Mg) 1 Tab Tab      2 TAB PO Q4H PRN for pain, #30 TAB 0 Refills         Prov: JELENA CORTES MD         11/20/18       Cyanocobalamin (Vitamin B-12*) 2,500 Mcg Tab.subl      2500 MCG PO QDAY, #30 TAB.SL         Reported         11/15/18       Anastrozole (Arimidex) 1 Mg Tablet      1 MG PO QDAY, #30 TAB 5 Refills         Prov: KIRSTIN GRIJALVA         5/23/18       Neratinib Maleate (Nerlynx) 40 Mg Tablet      240 MG PO QDAY for 30 Days, #180 TAB         Reported         4/10/18       Cholecalciferol (Vitamin D3*) 2,000 Unit Cap      2000 UNITS PO QDAY, #30 CAP  0 Refills         Reported         2/7/18       Calcium Carb/Vit D3 (CALCIUM 600-VIT D3 400 TABLET) 1 Each Tablet      1 EACH PO QDAY, #60 TAB 0 Refills         Reported         2/7/18       Metoprolol Succinate (Metoprolol Succinate*) 50 Mg Tab.er.24h      50 MG PO QDAY, #30 TAB.SR.24H 0 Refills         Reported         2/7/18       Melatonin (Melatonin) 10 Mg Tablet      10 MG PO HS, TAB         Reported         9/5/17       Aspirin Chew (Aspirin Baby) 81 Mg Tab.chew      81 MG PO QDAY, #30 TAB.CHEW 0 Refills         Reported         7/5/17       Multivitamins W-Minerals/Lut (Daily Multi-Vitamins with Minerals) 1 Each Tablet      1 TAB PO QDAY for 30 Days, #30 TAB         Reported         4/26/17       Omeprazole (Omeprazole*) 20 Mg Tablet.dr      20 MG PO QDAY, #30 CAP 0 Refills         Reported         4/26/17       Oxybutynin Chloride (Oxybutynin Chloride) 5 Mg Tablet      5 MG PO QDAY, TAB         Reported         4/26/17       Simvastatin (Simvastatin*) 20 Mg Tablet      20 MG PO QDAY, #30 TAB 0 Refills         Reported         4/26/17       Hydrochlorothiazide (Hydrochlorothiazide*) 12.5 Mg Capsule      12.5 MG PO QDAY, #30 CAP 0 Refills         Reported         4/26/17       Ondansetron Hcl (Ondansetron*) 8 Mg Tablet      8 MG PO Q6H PRN for NAUSEA, TAB 0 Refills         Reported         4/26/17       Diphenoxylate/Atropine (Lomotil) 1 Each Tablet      2.5 MG PO BID PRN for DIARRHEA, TAB         Reported         4/26/17       Loratadine (Claritin) 10 Mg Tablet      5 MG PO QDAY PRN for ALLERGIES, #15 TAB 0 Refills         Reported         4/26/17       Acetaminophen Es (Tylenol Extra Strength) 500 Mg Tablet      500 MG PO BID PRN for JOINT PAIN, #100 TAB 0 Refills         Reported         4/26/17      Medications Reviewed:  No Changes made to meds            IMPRESSION/PLAN      Impression      Breast cancer, triple positive.            Diagnosis      Hormone receptor positive cancer of left breast -  C50.912      Patient is on nerlynx and Arimidex.  Tolerating both well.  She is on her last     bottl of nerlynx which will complete her therapy.  She will continue Arimidex     for a minimum of 5 years.  I see no evidence of disease recurrence by her     history of physical examination.  She reports recent lab work at an outside     facility which will be requested.  I will plan to see her back in 4 months for     ongoing cancer surveillance with bone density prior.      New Diagnostics      * CBC With Auto Diff, Routine      * CMP Comp Metabolic Panel, Routine            Postmenopausal bone loss - M81.0      Bone density before next visit      New Diagnostics      * Bone Density, 4 Months            Patient Education            Practical Prevention -- Who Needs Bone Mineral Density Testing?      Patient Education Provided:  Yes                 Disclaimer: Converted document may not contain table formatting or lab diagrams. Please see [employer] for the authenticated document.

## 2021-05-28 NOTE — PROGRESS NOTES
Patient: KIKA ROSA     Acct: [id number]     Report: #[id number]  UNIT #: [id number]     : 1945    Encounter Date:10/30/2018  PRIMARY CARE: JHONATAN HURT  ***Signed***  --------------------------------------------------------------------------------------------------------------------  NURSE INTAKE      Visit Type      Established Patient Visit            Chief Complaint      breast ca      Intent of Therapy:  Curative            Referring Provider/Copies To      Referring Provider:  XU BURRIS      Provider Not Found in Lookup:  Dr. Jhonatan Hurt            History and Present Illness      Past Oncology Illness History      Ms. Rosa is a very pleasant 72-year-old  female with a recently di    agnosed left-sided breast cancer. Initial breast biopsy was performed on     10/10/2016 with biopsy of an area of concern of calcifications in the left     breast. 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 She then underwent a     needle lobe lumpectomy on 10/25/2016. The lumpectomy revealed an incidental     finding of grade 3 invasive lobular carcinoma the left breast. Estrogen receptor    +70%, progesterone receptor -0%, HER-2/young was equivocal 2+ IHC, Ki-67 10%. 5     sentinel lymph nodes were removed and one was found to contain isolated tumor     cells. Multiple margins were positive which required a reexcision. The     reexcision was performed on 2016 and was performed via total mastectomy.     Sebring lymph node evaluation was also performed. The size of the largest invas    lydia carcinoma was 9 mm but multiple foci of invasive carcinoma were noted.     Overall grade was again grade 3. All margins were uninvolved. Total lymph nodes     removed were 16 and unfortunately one of these was involved with macro     metastatic disease. This was staged as a pT1b pN1a stage IIA breast cancer.            HPI - Oncology Interim      Pt returns to clinic for 3mo f/u.  Doing well, reports experiencing  diarrhea     with Nerlynx however using Imodium and balancing.  Mammogram 9/2018 benign right    breast.  Left mastectomy site remains intact without changes or breakdown noted.     Last bone density 6/2017; currently utilizing Ca+/Vitamin D.  Exercising     routinely.  Occasionally experiences achiness; however, otherwise tolerating     Arimidex without issues.  Weight stable; appetite good.  Denies fever,     lymphadenopathy or SOA at this time.            Cancer Details            Left breast with multifocal invasive lobular carcinoma intermediate grade      ER/PA+ HER2+ Ki 67 25%            Clinical Staging      Stage IIA (T1b N1aM0)            Treatments      Chemotherapy      12/28/16 thru 4/11/17 completed 6C TCHP chemo; 12/19/17 completed 1yr Herceptin     therapy;  5/23/17 Arimidex initiated;   3/1/18 Nerlynx 240mg prescribed      Radiation Therapy      5/2/17 Declined radiation therapy            Clinical Trial Participant      No            ECOG Performance Status      0            PAST, FAMILY   Past Medical History      Hematology/Oncology (F):  Breast Cancer            Social History      Lives independently:  Yes      Occupation:  Retired - Nurse            Tobacco Use      Tobacco status:  Former smoker      Smoking packs/day:  1/4      Quit status:  Quit date established            Alcohol Use      Alcohol intake:  0-2 drinks per day            Substance Use      Substance use:  Denies use            REVIEW OF SYSTEMS      General:  Denies: Appetite Change, Fatigue, Fever, Night Sweats, Weight Gain,     Weight Loss      Eye:  Denies: Blurred Vision, Corrective Lenses, Diplopia, Eye Irritation, Eye     Pain, Eye Redness, Spots in Vision, Vision Loss      ENT:  Denies: Headache, Hearing Loss, Hoarseness, Seizures, Sinus Congestion,     Sore Throat      Cardiovascular:  Denies: Chest Pain, Edema Ankles, Edema Legs, Irregular     Heartbeat, Palpitations      Respiratory:  Denies: Coughing Blood,  Productive Cough, Shortness of Air,     Wheezing      Gastrointestinal:  Admits: Diarrhea;          Denies: Bloody Stools, Constipation, Frequent Heartburn, Nausea, Problem     Swallowing, Tarry Stools, Unable to Control Bowels, Vomiting      Genitourinary (female):  Denies: Blood in Urine, Decrease Urine Stream, Frequent    Urination, Incontinence, Painful Urination      Musculoskeletal:  Denies: Back Pain, Leg Cramps, Muscle Pain, Muscle Weakness,     Painful Joints, Swollen Joints      Integumentary:  Denies: Bleeds Easily, Bruises Easily, Hair Changes, Jaundice,     Lesions, Mole Changes, Nail Changes, Pigment Changes, Rash, Skin Discoloration      Neurologic:  Denies: Dizziness, Fainting, Numbness\Tingling, Paralysis, Seizures      Psychiatric:  Denies: Anxiety, Confused, Depression, Disoriented, Memory Loss      Endocrine:  Denies: Cold Intolerance, Diabetes, Excessive Sweating, Excessive     Thirst, Excessive Urination, Heat Intolerance, Flushing, Hyperthyroidism,     Hypothyroidism      Hematologic/Lymphatic:  Denies: Bruising, Bleeding, Enlarged Lymph Nodes,     Recurrent Infections, Transfusions      Reproductive:  Denies: Menopause, Heavy Periods, Pregnant, Still Menstruating            VITAL SIGNS AND SCORES      Vitals      Height 5 ft 5.08 in / 165.3 cm      Weight 223 lbs 1.688 oz / 101.2 kg      BSA 2.07 m2      BMI 37.0 kg/m2      Temperature 97.7 F / 36.5 C - Temporal      Pulse 71      Respirations 16      Blood Pressure 156/67 Sitting, Right Arm      Pulse Oximetry 97%, rm air            Pain Score      Pain Scale Used:  Numerical      Pain Intensity:  0            Fatigue Score      Fatigue (0-10 scale):  0 (none)            EXAM      General Appearance:  Positive for: Alert, Oriented x3, Cooperative;          Negative for: Acute Distress      Eye:  Positive for: Anicteric Sclerae, Moist Conjunctiva      Neck:  Positive for: Supple;          Negative for: JVD, Masses      Respiratory:  Positive  for: CTAB, Normal Respiratory Effort      Breast - Left:  Positive for: Appearance (Well-healed mastectomy site);          Negative for: Adenopathy, Discharge, Mass, Skin Changes      Breast - Right:  Positive for: Appearance (Normal-appearing female breast     without skin changes, masses, nipple retraction, discharge or axillary     adenopathy);          Negative for: Adenopathy, Discharge, Mass, Skin Changes      Chest:  Port in Place      Abdomen/Gastro:  Positive for: Normal Active Bowel Sounds, Soft;          Negative for: Distention, Hepatosplenomegaly, Tenderness      Cardiovascular:  Positive for: RRR;          Negative for: Gallop, Murmur, Peripheral Edema, Rub      Psychiatric:  Positive for: Appropriate Affect, Intact Judgement      Upper Extremities:  Negative for: Clubbing, Digital Cyanosis, Digital Ischemia      Lymphatic:  Negative for: Axillary, Cervical, Infraclavicular            PREVENTION      Hx Influenza Vaccination:  No      Influenza Vaccine Declined:  Yes      Hx Pneumococcal Vaccination:  No      Encouraged to follow-up with:  PCP regarding preventative exams.            ALLERGY/MEDS      Allergies      Coded Allergies:             NO KNOWN DRUG ALLERGIES (Verified  Allergy, Unknown, 10/22/18)            Medications      Last Reconciled on 7/11/18 15:44 by BRYANT CURIEL      Anastrozole (Arimidex) 1 Mg Tablet      1 MG PO QDAY, #30 TAB 5 Refills         Prov: KIRSTIN GRIJALVA         5/23/18       Neratinib Maleate (Nerlynx) 40 Mg Tablet      240 MG PO QDAY for 30 Days, #180 TAB         Reported         4/10/18       Cholecalciferol (Vitamin D*) 2,000 Unit Cap      2000 UNITS PO QDAY, #30 CAP 0 Refills         Reported         2/7/18       Ergocalciferol (Ergocalciferol*) 50,000 Unit Capsule      92904 UNITS PO Q7DAY, #4 CAP 0 Refills         Reported         2/7/18       Calcium Carb/Vit D3 (CALCIUM 600-VIT D3 400 TABLET) 1 Each Tablet      1 EACH PO QDAY, #60 TAB 0 Refills          Reported         2/7/18       Metoprolol Succinate (Metoprolol Succinate*) 50 Mg Tab.er.24h      50 MG PO QDAY, #30 TAB.SR.24H 0 Refills         Reported         2/7/18       Melatonin (Melatonin) 10 Mg Tablet      10 MG PO HS, TAB         Reported         9/5/17       traZODone HCl (traZODone HCl*) 50 Mg Tablet      50 MG PO HS PRN for SLEEP, #30 TAB 0 Refills         Reported         9/5/17       Aspirin Chew (Aspirin Baby) 81 Mg Tab.chew      81 MG PO QDAY, #30 TAB.CHEW 0 Refills         Reported         7/5/17       (vitamin b12)   No Conflict Check               Reported         4/26/17       Multivitamins W-Minerals/Lut (Daily Multi-Vitamins with Minerals) 1 Each Tablet      1 TAB PO QDAY for 30 Days, #30 TAB         Reported         4/26/17       Omeprazole (Omeprazole*) 20 Mg Tablet.dr      20 MG PO QDAY, #30 CAP 0 Refills         Reported         4/26/17       Oxybutynin Chloride (Oxybutynin Chloride) 5 Mg Tablet      5 MG PO QDAY, TAB         Reported         4/26/17       Simvastatin (Simvastatin*) 20 Mg Tablet      20 MG PO HS, #30 TAB 0 Refills         Reported         4/26/17       Hydrochlorothiazide (Hydrochlorothiazide*) 12.5 Mg Capsule      12.5 MG PO QDAY, #30 CAP 0 Refills         Reported         4/26/17       Ondansetron Hcl (Ondansetron*) 8 Mg Tablet      8 MG PO Q6H PRN for NAUSEA, TAB 0 Refills         Reported         4/26/17       Diphenoxylate/Atropine (Lomotil) 1 Each Tablet      2.5 MG PO BID PRN for DIARRHEA, TAB         Reported         4/26/17       Loratadine (Claritin) 10 Mg Tablet      5 MG PO QDAY PRN for ALLERGIES, #15 TAB 0 Refills         Reported         4/26/17       Acetaminophen Es (Tylenol Extra Strength) 500 Mg Tablet      500 MG PO BID PRN for JOINT PAIN, #100 TAB 0 Refills         Reported         4/26/17      Medications Reviewed:  No Changes made to meds            IMPRESSION/PLAN      Impression      Breast cancer, Stage IIA (T1b N1aM0) ER positive, AK positive,  HER-2/young     amplified            Diagnosis      Hormone receptor positive cancer of left breast - C50.912      Patient is doing well.  She is on adjuvant Arimidex which will be continued for     a minimum of 5 years, along with adjuvant nerlynx for the HER-2 positivity.      Tolerating both well.  She will have lab work today.  Continue the nerlynx for 1    year.  RTC 3 months for ongoing follow-up with lab work prior.      New Diagnostics      * CBC With Auto Diff, Routine      * CMP Comp Metabolic Panel, Routine      * CBC With Auto Diff, 3 Months      * CMP Comp Metabolic Panel, 3 Months                 Disclaimer: Converted document may not contain table formatting or lab diagrams. Please see [employer] for the authenticated document.

## 2021-05-28 NOTE — TELEPHONE ENCOUNTER
Patient: KIKA CHRISTIE     Acct: [id number]     Report: #[id number]  UNIT #: [id number]     : 1945    Encounter Date:01/10/2018  PRIMARY CARE: JHONATAN TAN  ***Signed***  --------------------------------------------------------------------------------------------------------------------  MD      Provider:  Dr. Cuevas            Allergies      Coded Allergies:             NO KNOWN DRUG ALLERGIES (Verified  Allergy, Unknown, 1/10/18)            Medications      Last Reconciled on 17 13:01 by BRYANT CURIEL      Melatonin (Melatonin) 10 Mg Tablet      10 MG PO HS, TAB         Reported         17       traZODone HCl (traZODone HCl*) 50 Mg Tablet      50 MG PO HS Y for SLEEP, #30 TAB 0 Refills         Reported         17       Metoprolol Succinate (Metoprolol Succinate *) 100 Mg Tab.er.24h      50 MG PO QDAY, #15 TAB.SR.24H         Reported         17       Aspirin (Aspirin Baby *) 81 Mg Tab.chew      81 MG PO QDAY, #30 TAB.CHEW 0 Refills         Reported         17       Anastrozole (Arimidex*) 1 Mg Tablet      1 MG PO QDAY, #30 TAB         Reported         17       (vitamin b12)   No Conflict Check               Reported         17       Multivitamins W-Minerals/Lut (Daily Multi-Vitamins with Minerals) 1 Each Tablet      1 TAB PO QDAY for 30 Days, #30 TAB         Reported         17       Omeprazole (Omeprazole*) 20 Mg Tablet.dr      20 MG PO QDAY, #30 CAP 0 Refills         Reported         17       Oxybutynin Chloride (Oxybutynin Chloride) 5 Mg Tablet      5 MG PO QDAY, TAB         Reported         17       Simvastatin (Simvastatin*) 20 Mg Tablet      20 MG PO HS, #30 TAB 0 Refills         Reported         17       Hydrochlorothiazide (Hydrochlorothiazide*) 12.5 Mg Capsule      12.5 MG PO QDAY, #30 CAP 0 Refills         Reported         17       Docusate Sodium (Colace) 100 Mg Capsule      100 MG PO BID Y for CONSTIPATION, #60 CAP 0 Refills          Reported         4/26/17       Prochlorperazine Maleate (Prochlorperazine Maleate) 10 Mg Tab      10 MG PO Q6H Y for NAUSEA AND/OR VOMITING, #60 TAB         Reported         4/26/17       Ondansetron Hcl (Ondansetron*) 8 Mg Tablet      8 MG PO Q6H Y for NAUSEA, TAB 0 Refills         Reported         4/26/17       Diphenoxylate/Atropine (Lomotil) 1 Each Tablet      2.5 MG PO BID Y for DIARRHEA, TAB         Reported         4/26/17       Loperamide (Loperamide) 2 Mg Capsule      2 MG PO ASDIR, TAB         Reported         4/26/17       Loratadine (Claritin) 10 Mg Tablet      5 MG PO QDAY Y for ALLERGIES, #15 TAB 0 Refills         Reported         4/26/17       Acetaminophen* (Tylenol Extra Strength*) 500 Mg Tablet      500 MG PO BID Y for JOINT PAIN, #100 TAB 0 Refills         Reported         4/26/17       Calcium Carb/Vit D3 (Calcium Carb 600 + D) 1 Each Tablet      2400 MG PO QDAY, #60 TAB 0 Refills         Reported         10/17/16      Current Medications      Current Medications Reviewed 1/10/18            Time of Call      12:14      Source of Call:  Patient            Reason for Call            Nerlynx 40 mg tab, take 6 tabs daily ordered on 1-10-18. Unknow length of      treatment. Pt to call office when medication arrives. Medication      information given to pt. All questions answered. Pt did not want lomotil      Rx, she wants to take imodium. Provider aware. JS            Guideline(s) Referenced      Yes            Verbalized Understanding      Caller verbalized, understanding of instructions given                 Disclaimer: Converted document may not contain table formatting or lab diagrams. Please see [employer] for the authenticated document.

## 2021-05-28 NOTE — PROGRESS NOTES
Patient: KIKA CHRISTIE     Acct: [id number]     Report: #[id number]  UNIT #: [id number]     : 1945    Encounter Date:2020  PRIMARY CARE: JHONATAN TAN  ***Signed***  --------------------------------------------------------------------------------------------------------------------  NURSE INTAKE      Visit Type      Established Patient Visit            Chief Complaint      BREAST CA      Intent of Therapy:  Curative            Referring Provider/Copies To      Primary Care Provider:  RENUKA ESTES      Copies To:   RENUKA ESTES            History and Present Illness      Past Oncology Illness History      Ms. Christie is a very pleasant 74-year-old  female with a recently     diagnosed left-sided breast cancer. Initial breast biopsy was performed on     10/10/2016 with biopsy of an area of concern of calcifications in the left     breast. 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 She then underwent a     needle lobe lumpectomy on 10/25/2016. The lumpectomy revealed an incidental     finding of grade 3 invasive lobular carcinoma the left breast. Estrogen receptor    +70%, progesterone receptor -0%, HER-2/young was equivocal 2+ IHC, Ki-67 10%. 5     sentinel lymph nodes were removed and one was found to contain isolated tumor     cells. Multiple margins were positive which required a reexcision. The     reexcision was performed on 2016 and was performed via total mastectomy.     Egnar lymph node evaluation was also performed. The size of the largest     invasive carcinoma was 9 mm but multiple foci of invasive carcinoma were noted.     Overall grade was again grade 3. All margins were uninvolved. Total lymph nodes     removed were 16 and unfortunately one of these was involved with macro     metastatic disease. This was staged as a pT1b pN1a stage IIA breast cancer.            HPI - Oncology Interim      Patient returns for ongoing surveillance of her left breast cancer.  She is      status post treatments as outlined.  She is currently on adjuvant hormone     therapy with anastrozole 1 mg daily started .  She is compliant with her     regimen, taking it every day.  She denies new masses lymphadenopathy.  No     unusual aches or pains.  She notes good energy level and is trying to exercise     with walking.  She notes good appetite and her weight is maintained.  She has no    other complaints today.            Cancer Details            Left breast with multifocal invasive lobular carcinoma intermediate grade      ER/IN+ HER2+ Ki 67 25%            Clinical Staging      Stage IIA (T1b N1aM0)            Treatments      Chemotherapy      16 thru 17 completed 6C TCHP chemo; 17 completed 1yr Herceptin     therapy;  17 Arimidex initiated;   3/1/18 Nerlynx 240mg prescribed      Radiation Therapy      17 declined radiation therapy      Surgeries      2016 Left Mastectomy            Clinical Trial Participant      No            ECOG Performance Status      0            Most Recent Imaging Findings      Patient: KIKA CHRISTIE   Acct: #[id number]   Report: #[id number]            UNIT #: [id number]    DOS: 09/10/20 1122      INSURANCE:MEDICARE PART A   LOCATION:[facility]     : 1945            PROVIDERS      ADMITTING:     ATTENDING: ARIANA CHOWDARY      FAMILY:  RENUKA ESTES   ORDERING:  ARIANA CHOWDARY         OTHER:    DICTATING:  VICKI DUMAS MD            REQ #:[id number]   EXAM:[id number] - DIG SCREEN w CAD RIGHT      REASON FOR EXAM:  SCREEN      REASON FOR VISIT:  SCREEN            *******Signed******         PROCEDURE:   RIGHT DIGITAL SCREENING MAMMOGRAM UTILIZING R2 CAD SOFTWARE             COMPARISON:   [facility], , DIGITAL SCREEN W/CAD RT,     2018, 10:22.        [facility], , DIGITAL SCREEN W/CAD RT, 2019, 11:19.             INDICATIONS:   SCREENING             FINDINGS:                RIGHT BREAST:   No significant suspicious finding.               IMPRESSION:      Stable right mammogram             RECOMMENDATION(S):          ROUTINE MAMMOGRAM AND CLINICAL EVALUATION IN 12 MONTHS.               BIRADS:          DIAGNOSTIC CATEGORY 1--NEGATIVE.               BREAST COMPOSITION:   There are scattered areas of fibroglandular density.             PLEASE NOTE:  A NORMAL MAMMOGRAM DOES NOT EXCLUDE THE POSSIBILITY OF BREAST     CANCER.       ANY CLINICALLY SUSPICIOUS PALPABLE LUMP SHOULD BE BIOPSIED.                VICKI DUMAS MD             Electronically Signed and Approved By: VICKI DUMAS MD on 9/10/2020 at 16:34                               Until signed, this is an unconfirmed preliminary report that may contain      errors and is subject to change.                                              TAWNYKE:      D:09/10/20 1634            PAST, FAMILY   Past Medical History      Past Medical History:  Hepatitis, High Cholesterol, Hypertension      Other PMH:        NEUROPATHY      Hematology/Oncology (F):  Breast Cancer            Past Surgical History      Biopsy, Fracture Repair, Lumpectomy, Mastectomy Left, VAD Placement            Family History      Family History:  Leukemia, Melanoma, Neurologic Cancer            Social History      Lives independently:  Yes      Number of Children:  4      Occupation:  Retired - Nurse            Tobacco Use      Tobacco status:  Former smoker      Smoking packs/day:  1/4            Substance Use      Substance use:  Denies use            REVIEW OF SYSTEMS      General:  Admits: Weight Loss;          Denies: Fatigue      Eye:  Admits Corrective Lenses      ENT:  Denies Headache      Cardiovascular:  Denies Chest Pain      Gastrointestinal:  Denies Diarrhea, Denies Nausea/Vomiting      Musculoskeletal:  Admits Muscle Pain; Denies Painful Joints      Neurologic:  Denies Dizziness, Denies Numbness\Tingling            VITAL SIGNS AND SCORES      Vitals      Weight 237 lbs 10.494 oz  / 107.8 kg      Temperature 98.7 F / 37.06 C - Temporal      Pulse 73      Respirations 16      Blood Pressure 158/80 Sitting, Right Arm      Pulse Oximetry 95%, RM AIR            Pain Score      Pain Scale Used:  Numerical      Pain Intensity:  2            Fatigue Score      Fatigue (0-10 scale):  0 (none)            EXAM      General Appearance:  Positive for: Alert, Cooperative;          Negative for: Acute Distress      Eye:  Positive for: Anicteric Sclerae, Moist Conjunctiva      Neck:  Positive for: Supple;          Negative for: JVD, Masses      Respiratory:  Positive for: CTAB, Normal Respiratory Effort      Breast - Left:  Positive for: Appearance (Well-healed mastectomy site without     evidence of intra-scar recurrence, masses or axillary adenopathy);          Negative for: Adenopathy      Breast - Right:  Positive for: Appearance (Normal-appearing female breast);          Negative for: Adenopathy      Abdomen/Gastro:  Positive for: Normal Active Bowel Sounds, Soft;          Negative for: Distention, Hepatosplenomegaly, Tenderness      Cardiovascular:  Positive for: RRR;          Negative for: Gallop, Murmur, Peripheral Edema, Rub      Psychiatric:  Positive for: Appropriate Affect, Intact Judgement      Lymphatic:  Negative for: Axillary, Cervical, Infraclavicular, Supraclavicular            PREVENTION      Influenza Vaccine Declined:  Yes      2 or More Falls in Past Year?:  No      Fall Past Year with Injury?:  No      Encouraged to follow-up with:  PCP regarding preventative exams.      Chart initiated by      ANTONIO MAYORGA MA            ALLERGY/MEDS      Allergies      Coded Allergies:             NO KNOWN ALLERGIES (Unverified , 9/14/20)            Medications      Last Reconciled on 9/14/20 11:54 by ARIANA CHOWDARY      Rosuvastatin Calcium (Crestor*) 10 Mg Tablet      20 MG PO HS, #60 TAB 0 Refills         Reported         9/14/20       amLODIPine (amLODIPine) 2.5 Mg Tablet      2.5 MG PO QDAY,  #30 TAB 0 Refills         Reported         9/14/20       Anastrozole (Anastrozole) 1 Mg Tablet      1 MG PO QDAY for 90 Days, #90 TAB 3 Refills         Prov: ARIANA CHOWDARY         6/4/20       Hctz (hydroCHLOROthiazide) 12.5 Mg Capsule      25 MG PO QDAY, #60 CAP 0 Refills         Reported         2/25/20       HYDROcodone-Acetaminophen 5-325 Mg (Norco 5-325 Mg) 1 Tab Tab      2 TAB PO Q4H PRN for pain, #30 TAB 0 Refills         Prov: JELENA CORTES MD         11/20/18       Cyanocobalamin (Vitamin B-12*) 2,500 Mcg Tab.subl      2500 MCG PO QDAY, #30 TAB.SL         Reported         11/15/18       Cholecalciferol (Vitamin D3) (Vitamin D3) 2,000 Unit Cap      2000 UNITS PO QDAY, #30 CAP 0 Refills         Reported         2/7/18       Calcium Carb/Vit D3 (CALCIUM 600-VIT D3 400 TABLET) 1 Each Tablet      1 EACH PO QDAY, #60 TAB 0 Refills         Reported         2/7/18       Metoprolol Succinate (Metoprolol Succinate) 50 Mg Tab.er.24h      50 MG PO QDAY, #30 TAB.SR.24H 0 Refills         Reported         2/7/18       Melatonin (Melatonin) 10 Mg Tablet      10 MG PO HS, TAB         Reported         9/5/17       Aspirin Chew (Aspirin Baby) 81 Mg Tab.chew      81 MG PO QDAY, #30 TAB.CHEW 0 Refills         Reported         7/5/17       Multivitamins W-Minerals/Lut (Daily Multi-Vitamins with Minerals) 1 Each Tablet      1 TAB PO QDAY for 30 Days, #30 TAB         Reported         4/26/17       Omeprazole (Omeprazole*) 20 Mg Tablet.dr      20 MG PO QDAY, #30 CAP 0 Refills         Reported         4/26/17       Oxybutynin Chloride (Oxybutynin Chloride) 5 Mg Tablet      5 MG PO QDAY, TAB         Reported         4/26/17       Hctz (hydroCHLOROthiazide) 12.5 Mg Capsule      12.5 MG PO QDAY, #30 CAP 0 Refills         Reported         4/26/17       Ondansetron Hcl (ONDANSETRON HCL) 8 Mg Tablet      8 MG PO Q6H PRN for NAUSEA, TAB 0 Refills         Reported         4/26/17       Loratadine (Claritin) 10 Mg Tablet      5 MG PO  QDAY PRN for ALLERGIES, #15 TAB 0 Refills         Reported         4/26/17       Acetaminophen Es (Tylenol Extra Strength) 500 Mg Tablet      500 MG PO BID PRN for JOINT PAIN, #100 TAB 0 Refills         Reported         4/26/17      Medications Reviewed:  Changes made to meds            IMPRESSION/PLAN      Diagnosis      Breast cancer metastasized to axillary lymph node         Carcinoma of left breast metastatic to axillary lymph node         Laterality: left      Triple positive.  Status post treatment as outlined.  She is currently on     adjuvant hormone therapy with anastrozole 1 mg daily.  I see no evidence of     disease recurrence by history, physical examination or recent mammogram.      Continue anastrozole for a minimum of 5 years.  We discussed low-fat low-    cholesterol diet and routine aerobic exercise as lifestyle modifications to     decrease the risk of breast cancer recurrence.  RTC 6 months for OV            Notes      New Medications      * amLODIPine 2.5 MG TABLET: 2.5 MG PO QDAY #30      * Rosuvastatin Calcium (Crestor*) 10 MG TABLET: 20 MG PO HS #60            Patient Education            Aerobic Exercise      Breast Self-exam (BSE)      Patient Education Provided:  Yes            Electronically signed by ARIANA CHOWDARY  09/14/2020 11:54       Disclaimer: Converted document may not contain table formatting or lab diagrams. Please see [employer] for the authenticated document.

## 2021-05-28 NOTE — PROGRESS NOTES
Patient: KIKA CHRISTIE     Acct: [id number]     Report: #[id number]  UNIT #: [id number]     : 1945    Encounter Date:03/15/2021  PRIMARY CARE: JHONATAN TAN  ***Signed***  --------------------------------------------------------------------------------------------------------------------  NURSE INTAKE      Visit Type      Established Patient Visit            Chief Complaint      BREAST CA F/U      Intent of Therapy:  Curative            Referring Provider/Copies To      Referring Provider:  RENUKA ESTES      Primary Care Provider:  RENUKA ESTES      Copies To:   RENUKA ESTES            History and Present Illness      Past Oncology Illness History      Ms. Christie is a very pleasant 74-year-old  female with a recently     diagnosed left-sided breast cancer. Initial breast biopsy was performed on     10/10/2016 with biopsy of an area of concern of calcifications in the left     breast. 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 She then underwent a     needle lobe lumpectomy on 10/25/2016. The lumpectomy revealed an incidental     finding of grade 3 invasive lobular carcinoma the left breast. Estrogen receptor    +70%, progesterone receptor -0%, HER-2/young was equivocal 2+ IHC, Ki-67 10%. 5     sentinel lymph nodes were removed and one was found to contain isolated tumor     cells. Multiple margins were positive which required a reexcision. The     reexcision was performed on 2016 and was performed via total mastectomy.     [place] lymph node evaluation was also performed. The size of the largest     invasive carcinoma was 9 mm but multiple foci of invasive carcinoma were noted.     Overall grade was again grade 3. All margins were uninvolved. Total lymph nodes     removed were 16 and unfortunately one of these was involved with macro     metastatic disease. This was staged as a pT1b pN1a stage IIA breast cancer.            HPI - Oncology Interim      This is a very pleasant  75-year-old white female who was diagnosed with left-    sided breast cancer in 2016, triple positive, stage IIA, [id number].  She     underwent left mastectomy, adjuvant chemotherapy, TCHP, Herceptin x 1y, Nerlynx     and has been on Arimidex since 5/2017.  She is tolerating anastrozole well.  She    is not aware of any significant side effects from that.  She does complain of     mild chronic feet paresthesias but is not interested in any treatment for that.     Today she also complains of recurrent episodes of dizzy spells since last year.     The last episode was about 2 weeks ago which happened after she went to a     restaurant and after coming out of the bathroom from the restaurant and after     having had dinner.  She described the episode as feeling that the room was     spinning around her, lasting for 2 minutes and spontaneously subsiding.  The new    other the other prior episodes were similar in nature but not as intense. I     discussed with her that the symptom that she is describing is consistent with     vertigo.  We discussed the possibility of being evaluated by neurology.  She was    not interested in that at this time. However, she agreed that if the symptoms     continue to progress, we may consider a brain MRI but at this point she might be    better off pursuing neurological evaluation for vertigo.            Cancer Details            Left breast with multifocal invasive lobular carcinoma intermediate grade      ER/NV+ HER2+ Ki 67 25%            Clinical Staging      Stage IIA (T1b N1aM0)            Treatments      Chemotherapy      12/28/16 thru 4/11/17 completed 6C TCHP chemo; 12/19/17 completed 1yr Herceptin     therapy;  5/23/17 Arimidex initiated;   3/1/18 Nerlynx 240mg prescribed      Radiation Therapy      5/2/17 declined radiation therapy      Surgeries      11/16/2016 Left Mastectomy            Clinical Trial Participant      No            ECOG Performance Status      0             PAST, FAMILY   Past Medical History      Past Medical History:  Hepatitis, High Cholesterol, Hypertension      Other PMH:        NEUROPATHY      Hematology/Oncology (F):  Breast Cancer            Past Surgical History      Biopsy, Fracture Repair, Lumpectomy, Mastectomy Left, VAD Placement            Family History      Family History:  Leukemia, Melanoma, Neurologic Cancer            Social History      Lives independently:  Yes      Number of Children:  4      Occupation:  Retired - Nurse            Tobacco Use      Tobacco status:  Former smoker      Smoking packs/day:  1/4            Substance Use      Substance use:  Denies use            REVIEW OF SYSTEMS      General:  Denies: Appetite Change, Fatigue, Fever, Night Sweats, Weight Gain, W    eight Loss      Eye:  Denies Blurred Vision, Denies Corrective Lenses, Denies Diplopia, Denies     Vision Changes      ENT:  Denies Headache, Denies Hearing Loss, Denies Hoarseness, Denies Sore     Throat      Cardiovascular:  Denies Chest Pain, Denies Palpitations      Respiratory:  Denies: Cough, Coughing Blood, Productive Cough, Shortness of Air,    Wheezing      Gastrointestinal:  Denies Bloody Stools, Denies Constipation, Denies Diarrhea,     Denies Nausea/Vomiting, Denies Problem Swallowing, Denies Unable to Control     Bowels      Genitourinary:  Denies Blood in Urine, Denies Incontinence, Denies Painful     Urination      Musculoskeletal:  Denies Back Pain, Denies Muscle Pain, Denies Painful Joints      Integumentary:  Denies Itching, Denies Lesions, Denies Rash      Neurologic:  Admits Dizziness (DIZZY SPELLS- A FEW TIMES A MONTH); Denies     Numbness\Tingling, Denies Seizures      Psychiatric:  Denies Anxiety, Denies Depression      Endocrine:  Denies Cold Intolerance, Denies Heat Intolerance      Hematologic/Lymphatic:  Denies Bruising, Denies Bleeding, Denies Enlarged Lymph     Nodes      Reproductive:  Denies: Menopause, Heavy Periods, Pregnant, Still  Menstruating            VITAL SIGNS AND SCORES      Vitals      Weight 227 lbs 4.708 oz / 103.1 kg      Temperature 97.2 F / 36.22 C - Temporal      Pulse 101      Respirations 18      Blood Pressure 150/84 Sitting, Right Arm      Pulse Oximetry 98%, RM AIR            Pain Score      Experiencing any pain?:  No      Pain Scale Used:  Numerical      Pain Intensity:  0            Fatigue Score      Experiencing any fatigue?:  No      Fatigue (0-10 scale):  0 (none)            EXAM      Other      Alert, oriented x3, cooperative, not in distress      HEENT: normocephalic, wearing a facemask      Neck: no lymphadenopathy      Lungs: clear bilaterally.      Heart: regular rate and rhythm.      Abdomen: soft, nontender, no palpable organomegaly      Extremities: no ankle edema      Neurologic: moves all extremities            PREVENTION      Influenza Vaccine Declined:  Yes      2 or More Falls in Past Year?:  No      Fall Past Year with Injury?:  No      Hx Pneumococcal Vaccination:  No      Encouraged to follow-up with:  PCP regarding preventative exams. (COVID VACCIN    ATION 2/26/21)      Chart initiated by      JOSE CARLOS CASTRO MA            ALLERGY/MEDS      Allergies      Coded Allergies:             NO KNOWN ALLERGIES (Unverified , 9/14/20)            Medications      Last Reconciled on 9/14/20 11:54 by ARIANA CHOWDARY      Alpha Lipoic Acid (Alpha Lipoic Acid) 200 Mg Tablet      200 MG PO TID, #90 TAB         Reported         3/15/21       Rosuvastatin Calcium (Crestor*) 10 Mg Tablet      20 MG PO HS, #60 TAB 0 Refills         Reported         9/14/20       amLODIPine (amLODIPine) 2.5 Mg Tablet      2.5 MG PO QDAY, #30 TAB 0 Refills         Reported         9/14/20       Anastrozole (Anastrozole) 1 Mg Tablet      1 MG PO QDAY for 90 Days, #90 TAB 3 Refills         Prov: ARIANA CHOWDARY         6/4/20       Hctz (hydroCHLOROthiazide) 12.5 Mg Capsule      25 MG PO QDAY, #60 CAP 0 Refills         Reported         2/25/20        Cyanocobalamin (Vitamin B-12*) 2,500 Mcg Tab.subl      2500 MCG PO QDAY, #30 TAB.SL         Reported         11/15/18       Cholecalciferol (Vitamin D3) (Vitamin D3) 2,000 Unit Cap      2000 UNITS PO QDAY, #30 CAP 0 Refills         Reported         2/7/18       Calcium Carbonate/Vitamin D3 (Calcium 600-Vit D3 400 Tablet) 1 Each Tablet      1 EACH PO QDAY, #60 TAB 0 Refills         Reported         2/7/18       Metoprolol Succinate (Metoprolol Succinate) 50 Mg Tab.er.24h      50 MG PO QDAY, #30 TAB.SR.24H 0 Refills         Reported         2/7/18       Melatonin (Melatonin) 10 Mg Tablet      10 MG PO HS, TAB         Reported         9/5/17       Aspirin Chew (Aspirin Baby) 81 Mg Tab.chew      81 MG PO QDAY, #30 TAB.CHEW 0 Refills         Reported         7/5/17       Multivitamins W-Minerals/Lut (Daily Multi-Vitamins with Minerals) 1 Each Tablet      1 TAB PO QDAY for 30 Days, #30 TAB         Reported         4/26/17       Omeprazole (Omeprazole*) 20 Mg Tablet.dr      20 MG PO QDAY, #30 CAP 0 Refills         Reported         4/26/17       Oxybutynin Chloride (Oxybutynin Chloride) 5 Mg Tablet      5 MG PO QDAY, TAB         Reported         4/26/17       Hctz (hydroCHLOROthiazide) 12.5 Mg Capsule      12.5 MG PO QDAY, #30 CAP 0 Refills         Reported         4/26/17       Ondansetron Hcl (ONDANSETRON HCL) 8 Mg Tablet      8 MG PO Q6H PRN for NAUSEA, TAB 0 Refills         Reported         4/26/17       Loratadine (Claritin) 10 Mg Tablet      5 MG PO QDAY PRN for ALLERGIES, #15 TAB 0 Refills         Reported         4/26/17       Acetaminophen Es (Tylenol Extra Strength) 500 Mg Tablet      500 MG PO BID PRN for JOINT PAIN, #100 TAB 0 Refills         Reported         4/26/17      Medications Reviewed:  Changes made to meds            IMPRESSION/PLAN      Impression      ASSESSMENT      History of stage IIA, triple positive left-sided breast cancer without evidence     of recurrence.  The latest mammogram in 9/2020  was negative.  She is tolerating     adjuvant anastrozole well.            Complains of dizziness consistent with vertigo, suspected benign positional     vertigo, symptoms of brain mets are less likely considering the chronicity and     the clinical description of the symptoms.            PLAN/RECOMMENDATIONS      Continue anastrozole.  She was anticipating discontinuing anastrozole in 5     years.  However, we should address in the next visits whether continuing the     adjuvant anastrozole beyond 5 years may be appropriate for her considering the     original stage of her malignancy.              She has labs every 6 months by primary care and was told all the results are nor    mal so I did not request additional labs.  Repeat mammogram in 9/2021 prior to     the next visit.            If the neurological symptoms progress, the patient was advised to call us to     consider brain MRI.            Diagnosis      Breast cancer screening - Z12.39            S/P mastectomy - Z90.10            Notes      New Medications      * ALPHA LIPOIC ACID (Alpha Lipoic Acid) 200 MG TABLET: 200 MG PO TID #90      Discontinued Medications      * HYDROcodone-Acetaminophen 5-325 Mg (Norco 5-325 Mg) 1 TAB TAB: 2 TAB PO Q4H       PRN pain #30      New Diagnostics      * DIG DIAG RIGHT DRE w 3D SUNSHINE, As Soon As Possible         Dx: Breast cancer screening - Z12.39      * Screening Mammo, 6 Months         Dx: S/P mastectomy - Z90.10            Patient Education      Patient Education Provided:  Yes            Electronically signed by Zak Schaffer  03/15/2021 16:24       Disclaimer: Converted document may not contain table formatting or lab diagrams. Please see [employer] for the authenticated document.

## 2021-05-28 NOTE — PROGRESS NOTES
Patient: KIKA CHRISTIE     Acct: [id number]     Report: #[id number]  UNIT #: [id number]     : 1945    Encounter Date:04/10/2018  PRIMARY CARE: JHONATAN HURT  ***Signed***  --------------------------------------------------------------------------------------------------------------------  Chief Complaint      Apr 10, 2018      LEFT BREAST CANCER      BREAST CA , TRIPLE POSITIVE, LEFT      Intent of Therapy:  Curative            Vitals      Height 5 ft 5.08 in / 165.3 cm      Weight 231 lbs 11.255 oz / 105.1 kg      BSA 2.25 m2      BMI 38.5 kg/m2      Temperature 98.0 F / 36.67 C - Temporal      Pulse 71      Blood Pressure 143/58 Sitting, Right Arm      Pulse Oximetry 97%, ROOM AIR            General Information      Referring Provider:  XU BURRIS      Provider Not Found in Lookup:  Dr. Jhonatan Hurt      [facility] Provider 2:  Roxanne Keita      [facility] Provider 3:  Reji Wild            Allergies      Coded Allergies:             NO KNOWN DRUG ALLERGIES (Verified  Allergy, Unknown, 4/10/18)            Medications      Last Reconciled on 4/10/18 17:15 by BRYANT CURIEL      Neratinib Maleate (Nerlynx) 40 Mg Tablet      240 MG PO QDAY for 30 Days, #180 TAB         Reported         4/10/18       Cholecalciferol (Vitamin D*) 2,000 Unit Cap      2000 UNITS PO QDAY, #30 CAP 0 Refills         Reported         18       Ergocalciferol (Ergocalciferol*) 50,000 Unit Capsule      48103 UNITS PO Q7DAY, #4 CAP 0 Refills         Reported         18       Calcium Carb/Vit D3 (Calcium Carb 600 + D) 1 Each Tablet      1 EACH PO QDAY, #60 TAB 0 Refills         Reported         18       Metoprolol Succinate (Metoprolol Succinate*) 50 Mg Tab.er.24h      50 MG PO QDAY, #30 TAB.SR.24H 0 Refills         Reported         18       Melatonin (Melatonin) 10 Mg Tablet      10 MG PO HS, TAB         Reported         17       traZODone HCl (traZODone HCl*) 50 Mg Tablet      50 MG PO HS Y for SLEEP, #30 TAB 0 Refills          Reported         9/5/17       Aspirin (Aspirin Baby *) 81 Mg Tab.chew      81 MG PO QDAY, #30 TAB.CHEW 0 Refills         Reported         7/5/17       Anastrozole (Arimidex*) 1 Mg Tablet      1 MG PO QDAY, #30 TAB         Reported         5/23/17       (vitamin b12)   No Conflict Check               Reported         4/26/17       Multivitamins W-Minerals/Lut (Daily Multi-Vitamins with Minerals) 1 Each Tablet      1 TAB PO QDAY for 30 Days, #30 TAB         Reported         4/26/17       Omeprazole (Omeprazole*) 20 Mg Tablet.dr      20 MG PO QDAY, #30 CAP 0 Refills         Reported         4/26/17       Oxybutynin Chloride (Oxybutynin Chloride) 5 Mg Tablet      5 MG PO QDAY, TAB         Reported         4/26/17       Simvastatin (Simvastatin*) 20 Mg Tablet      20 MG PO HS, #30 TAB 0 Refills         Reported         4/26/17       Hydrochlorothiazide (Hydrochlorothiazide*) 12.5 Mg Capsule      12.5 MG PO QDAY, #30 CAP 0 Refills         Reported         4/26/17       Ondansetron Hcl (Ondansetron*) 8 Mg Tablet      8 MG PO Q6H Y for NAUSEA, TAB 0 Refills         Reported         4/26/17       Diphenoxylate/Atropine (Lomotil) 1 Each Tablet      2.5 MG PO BID Y for DIARRHEA, TAB         Reported         4/26/17       Loratadine (Claritin) 10 Mg Tablet      5 MG PO QDAY Y for ALLERGIES, #15 TAB 0 Refills         Reported         4/26/17       Acetaminophen* (Tylenol Extra Strength*) 500 Mg Tablet      500 MG PO BID Y for JOINT PAIN, #100 TAB 0 Refills         Reported         4/26/17      Current Medications      Current Medications Reviewed 4/10/18            Pain and Fatigue Scales      Pain Assessment:           Experiencing any pain?:  No      Fatigue:           Experiencing any fatigue?:  Yes         Fatigue (0-10 scale):  2            Chemo Status      On Oral Chemotherapy?:  Yes      Comment:        ARIMIDEX X 5 YEARS 5/23/17      Neratinib x 1 year (1/2018)            Other      Clinical Trial Participant?:  No             PAST, FAMILY   Past Medical History      Yes Hypertension, Yes Sleep apnea      Hematology/oncology:  REPORTS HX OF: Breast cancer      Previous Blood Transfusion:  Prev Blood Transfusion,how many?            Past Surgical History      REPORTS HX OF: Mastectomy, left, Other Past Surgical Hx            Family History      REPORTS HX OF: Blood Cancer (MOTHER), Eye Cancer (MOTHER), Leukemia (MOTHER),     Other Cancer History (BRAIN TUMOR- FATHER)            Social History      Yes      Retired - Nurse            Tobacco Use      Tobacco status:  Former smoker      Smoking packs/day:  1/4      Quit status:  Quit date established            Alcohol Use      Alcohol intake:  0-2 drinks per day            Substance Use      Substance use:  Denies use      Counseling given:  None            Past Oncology Illness History      CHIEF COMPLAINT:      LEFT BR CA ER+ HER 2 YOUNG +            Ms. Cindy Rosa is a very pleasant 72-year-old  female with a recently     diagnosed left-sided breast cancer. Initial breast biopsy was performed on 10/10    /2016 with biopsy of an area of concern of calcifications in the left breast.     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 She then underwent a needle     lobe lumpectomy on 10/25/2016. The lumpectomy revealed an incidental finding of     grade 3 invasive lobular carcinoma the left breast. Estrogen receptor +70%,     progesterone receptor -0%, HER-2/young was equivocal 2+ IHC, Ki-67 10%. 5     sentinel lymph nodes were removed and one was found to contain isolated tumor     cells. Multiple margins were positive which required a reexcision. The     reexcision was performed on 11/16/2016 and was performed via total mastectomy.     [place] lymph node evaluation was also performed. The size of the largest     invasive carcinoma was 9 mm but multiple foci of invasive carcinoma were noted.     Overall grade was again grade 3. All margins were uninvolved. Total lymph  "nodes     removed were 16 and unfortunately one of these was involved with macro     metastatic disease. This was staged as a pT1b pN1a stage IIA breast cancer.            She was then referred to my clinic and I saw her in the multidisciplinary     breast clinic on 12/01/2016. At that time she stated she was at her normal     baseline state of health. She did report some erythema at the left chest wall     mastectomy site. She did report a low-grade fever due to possible infection in     this area. On the date of first first clinic appointment with me on 12/01/2016     she had her surgical drain removed.            INTERIM HISTORY:       DATE: 3/20/17      She presented for C6 TCH+P chemotherapy.             She states her superficial skin infection has almost completely resolved. She     denies any fever. No nausea. No vomiting. No rash. No diarrhea.      Echo completed 12/08/16 with normal EF.  Tolerated cycle 1 well.  Has lost her     hair.  No other complaints.             Returned 1/12/17 for toxicity evaluation.  No f/c, no nausea or vomiting.  Her     left postmastectomy wound is being packed.  According to patient wound bed is     clean, with no slough or purulent drainage.  Patient reports \"Dr. Keita told     me it is okay to get my next cycle\"  Will given Neulasta with next cycle. She     experienced diarrhea and a faint rash post cycle one.  Using immodium which is     working.  Rash is resolving.  Will monitor both.              Returns for toxicity evaluation.  Reports \"I think I am tolerating chemo well\".     Diarrhea has sl. worsened per patient, would like to try Lomotil. Has improved.                  Still with left breast wound with dry packings per Dr. Keita. These were     reported to be markedly improved by her surgeon per the patient.            Has seen PCP and noticed \"freckles in oral cavity\", had never been told this     before. She was sent to ENT (Dr. Yu or Dr. Bliss) for " "evaluation and they     felt this was benign.             Feels well, tolerating chemotherapy really well.  Is still active, maintaining     weight.  Has 2-3 episodes of diarrhea daily previously however with Lomotil     keeps it under control. No fever. No infection.  ECHO ordered. Refilled Lomotil     and Potassium.             Starting single agent Herceptin.  Has appointment with Dr. Wild today.             Presented for single agent Herceptin on 5/23/17.  Has decided not to proceed     with radiation.  Will start antiestrogen oral pill.             Presented for Herceptin on 7/5/17.  Taking Arimidex. No side effects.  Next     ECHO due in July, ordered.  Ordered right screening mammo.            Presented for Herceptin on 7/25/17.  More energy \"starting to feel like old self    \".   ECHO 7/5/17 60%.  Mammo scheduled for 9/5/17.  Osteopenia of hip noted on     bone densitometry.  This is her baseline since starting Arimidex.  Taking     Calcium and Vit D.             Presented for Herceptin on 9/5/17. Tolerating Arimidex without problems.  More     energy.  Has mammogram ordered for next month.  No headaches, dizziness,     hoarseness, cough, SOA or pain.             Presented for Herceptin on 9/28/17.  More energy.  Back to doing all of her on     ADLs.  No headaches, cough, pain.             Presented for Herceptin on 10/16/17.  No fever, chills, headaches, peripheral     edema, cough or pain.             Presented for Herceptin on 11/7/17.  ECHO done ejection fraction 55%.  No fever    , chills, peripheral edema, chest pain or cough.              Interim history: 11/28/17      Presents on 11/28/17 for Herceptin.  Energy level has returned.  Eating well.      No fever, chills, headaches, SOA, cough or pedal edema.             Interim history: 12/19/17      Presents on 12/19/17 for Herceptin.  This is her last dose.  No fever, SOA,     cough, pedal edema or orthopnea.              Interim history: 1/10/18    "   Presents on 1/10/18 for 2 week follow up after completion of Herceptin.  She is     back to pretreatment ADLs.  No fever, chills, SOA, pedal edema.             SURVIVORSHIP APPOINTMENT: 4/10/18      [place] for Survivorship appointment on 4/10/18.  Patient was a T1b N1 M0 left     infiltrating lobular carcinoma.  She underwent mastectomy 11/16/16.  1/16 lymph     nodes positive.  Estrogen, progesterone and Her2 young positive.  She received     adjuvant therapy utilizing 6 cycles of Taxotere, Carboplatin, Herceptin and     Perjeta.  She continued with single agent Herceptin for one year.  She     completed her therapy on 12/19/17.  She did decline radiation therapy.  She now     continues endocrine therapy (Arimidex) and Her 2 young targeted therapy (Nerlyx).      She will stay on Arimidex for 5 years.  She will utilized Nerlyx for one full     year.  During treatment she experienced prolonged wound healing of left     mastectomy which required wound care.  This did completely heal.  She completed     chemotherapy with minimal side effects.  She has no long lasting side effects     from her therapy.  She is back doing all of her own ADLs and is traveling.      Health maintenance and prevention discussed with patient.              OTHER:      MOST RECENT MAMMOGRAM:  9/5/17 (BIRADS 2 Benign)            REVIEW OF SYSTEMS      General:  Denies: Appetite change, Fatigue, Weight loss      Eyes:  Denies: Blurred vision, Diplopia      Ears, nose, mouth, throat:  Denies: Headache, Visual Changes      Cardiovascular:  Denies: Irregular heartbeat, Palpitations, Swollen ankles/legs      Respiratory:  Denies: Shortness of Air, Productive cough      Gastrointestinal:  Denies: Nausea, Vomiting, Problem swallowing, Tarry stools,     Bloody stools      Kidney/Bladder:  Denies: Painful Urination, Change in urinary stream      Musculoskeletal:  Denies: New Back pain, Leg Cramps, Muscle weakness      Skin:  DENIES: Easy Bleeding, Nail  changes, Rash      Neurological:  Denies: Dizziness, Numbness\Tingling      Psychiatric:  Complains of: AAO X 3, Denies: Anxiety, Panic attacks      Endocrine:  DiabetesThyroid Disorder      Hematologic/lymphatic:  Denies: Bruising, Bleeding      Reproductive:  Complains of Menopause, Denies Pregnant            EXAM      General Appearance:  Alert, Oriented X3, No acute distress      Eyes:  Anicteric Sclerae, Moist Conjunctiva      HEENT:  Orophraynx clear, No Erythema      Neck:  Supple, Full ROM      Respiratory:  CTAB, No Crackels      Abdomen\Gastro:  Soft, No Masses      Cardio:  RRR, No Murmur, No, Peripheral Edema      Skin:  Normal Temperature, Normal Tone, No Rash, lesions, ulcers      Psychiatric:  Appropriate Affect, AAO x3      Neuro:  Cranial Nerve II-XII Inta, No Focal Sensory Deficit      Muscularskeletal:  Normal Gait and Station, Full ROM of extremeties      Extremities:  No Digital Cyanosis, No Digital Ischemia      Lymphatic:  No Cervical, No Supraclavicular, No Axillary            Radiology Results      n/a            Current Plan      I reviewed the results of her previous imaging studies which revealed a highly     concerning area of calcifications in the left breast. I reviewed her initial     lumpectomy and subsequent mastectomy pathology. I reviewed the natural history     of HER-2 positive breast cancer. I also reviewed the NCCN Invasive Breast     Cancer Version 2.2016 Guidelines.            I reviewed for invasive lobular carcinoma which had proven silvina involvement     the guideline recommendation is a category 1 recommendation for adjuvant     trastuzumab-based chemotherapy followed by endocrine therapy. I reviewed with     her that there are currently 2 guideline and recommended regimens for HER-2     positive disease. We discussed the various regimens and I recommended TCH plus     Pertuzumab in this setting. I reviewed the risks and potential complications of     chemotherapy  including the increased risk of death during treatment and she     voiced understanding of this and was in agreement. In preparation for treatment     she will require an echocardiogram prior to Herceptin based treatment. She will     also require placement of a venous port cath for IV access.            As noted she underwent her mastectomy approximately 2 weeks prior to her     initial appointment on 11/16/2016. I gave her at least 4 weeks to heal     especially given the superficial skin infection. Healing.             She returns for C6 TCH+P chemotherapy and has had minimal side effects.      Toxicity monitoring. Labs and chemotherapy reviewed. Echocardiogram normal. She     will require endocrine therapy and possible radiation given her positive silvina     status after completion of 6 cycles.            She also was noted to have anemia and I performed an anemia workup. She states     this is due to bleeding and oozing post-op which is quite possible. Her anemia     appeared to have markedly improved. Stable.            The skin infection (postmastectomy left wound) was previously being packed.     Continues to follow with Dr. Keita who states this is much improved.             Diarrhea after cycle one controlled Lomotil. Will monitor. Improved.             Rash after cycle one. Resolved.            Noted with hypokalemia, will give 40 meq today and recheck at 7-10 day follow     up. Potassium improved today 3.3, will give 20 meq KCL. Potassium level 2.9 at     C4, will give 40 meq KCL today, and start daily 20 meq Kdur.   Repeat labs     pending.            C6 TCH-P completed on 04/11/17.            Refer to ENT for evaluation of discolorations in oral palate. This was     performed and was benign per ENT.            Calcium low, albumin normal, will send ionized calcium.  Taking Calcium + D     daily.  Will monitor.             Will see her back in 2 weeks for toxicity evaluation.  Discussed will continue      Herceptin x 1 year.  ECHO 55%..  Will need endocrine therapy and possible     radiation.  Will refer to Dr. Wild.             She still has not seen Dr. Wild and we sent referral again.  I told her it     would be his discretion regarding radiation treatment.  She is doing well now,     said the last treatment was the hardest for her to recover.  If she does not     require radiation treatment she will be started on endocrine therapy. Otherwise     I will start endocrine therapy after XRT.            She is having quite a few dental issues after eating a lobster. I told her     after next week she can pursue any dental treatment she needs.            Potassium 2.9 increased Kdur 20 meq to twice daily.             Arimidex daily x 5 years (5/2022).  Side effects discussed.  Bone densitometry     ordered.  Herceptin x 1 full year.  Echo done 4/5/17.  Potassium 3.6 continue     KCL 20 meq daily defer further to PCP.             Herceptin today (8/15/17).  ECHO 7/5/17 60%.  Right screening mammo ordered for     9/5/17.  Osteopenia to hip on bone densitometry. Continue Vit D and Calcium.             Received Herceptin on 9/28/17.  Labs reviewed. Toxicity monitoring. Keep     appointment for mammogram.  Continue Arimidex.  Encouraged exercise.              Received Herceptin on 10/16/17.  Labs reviewed. Mammogram benign.  Toxicity     monitoring.  Repeat ECHO in 4 weeks.  Continue Arimidex.             Received Herceptin on 11/7/17.  Labs reviewed.  ECHO 55%.  No toxicities noted.             plan: 11/28/17       Proceed with Herceptin.  Will continue Arimidex.  Labs reviewed.  Toxicity     monitoring.              Plan: 12/19/17      Proceed with last dose of Herceptin.  Will discuss Neratinib at 3-4 week follow     up.  Will continue Arimidex for a total of 5 years.  Labs reviewed.              Plan: 1/10/18      Has returned to pretreatment ADLs.  No remaining toxicities noted. Discussed     Neratinib and  rationale.  Nerlynx is an oral extended adjuvant therapy intended     for use after Herceptin.  It also targets Her2 cancer cells.  1 in 4 early     stage Her2+ breast cancer patients experience recurrence after Herceptin.      Nerlynx decrease this risk.  Side effects discussed.  Diarrhea seen in 30-40%     of patients that use Nerlynx.  Will prophylactically treat to prevent.  Imodium     works better for her and she would just rather use it versus Lomotil.  She will     take 2 imodium tablets three times per day and will increase or decrease     depending on side effect.  Referral for colonoscopy screening given.  Will     continue Arimidex.  Mammo and bone densitometry up to date. She will follow up     in one month to evaluate for toxicities.  She will follow up in 3 months for     her Survivorship appointment.             SURVIVORSHIP PLAN: 4/10/18      Will continue with active surveillance.  Reviewed goal of cancer survivorship     including preventing secondary cancers and recurrence of cancer when possible.      Promoting appropriate management following diagnosis and treatment.  Minimizing     preventable pain, disability and psychosocial distress beyond the cancer     diagnosis.  Guiding access to community support if needed.        She feels that she is 90% of her precancer status abilities.  Discussed     exercise and rationale.  Strongly encouraged this for many reasons.  Exercise     has been shown to prevent or ameliorate many treatment related effects     including fatigue, muscle weakness, decline in cardiovascular function and     overall functional ability, neuropathy and improve quality of life.  Bone     densitometry done on 6/14/17 and will repeat in 2019.  She is taking Calcium     and Vit D.  Her mammogram on the right breast is due September 2018.      Colonoscopy performed 2/12/2018 per Dr. Flores.  She will continue Arimidex     for 5 full years.  She will continue Neratinib/Nerlyx for  1 year.        Vaccines discussed and will defer to PCP as she has had a few and I do not have     record of them.  A copy of her Cancer Survivorship Care plan will be sent to     her PCP.  She will continue active surveillance with follow up appointments.                      She was given time to ask questions and these questions were answered. She had     no additional questions or concerns and all questions were answered to her     satisfaction.            Available for immediate release. Please forward a copy of this dictation to Dr. Roxanne Keita and [facility] BRYANT Solis and Dr. Reji Wild.            TREATMENT SUMMARY:      1. DIAGNOSED       2. TCH+P chemotherapy; C6, COMPLETED 4/11/17      3. DECLINED RADIATION      4. ARIMIDEX 1 MG DAILY X 5 YEARS.       5. 9/5/17 - mammo right benig      6. 12/19/17 - Herceptin completed.       7. 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     detected by sentinel lymph node dissection but not clinically apparent - C79.89    , C50.919            HER2-positive carcinoma of left breast - C50.912            Lobular carcinoma in situ of left breast - D05.02            Notes      New Medications      * Neratinib Maleate (Nerlynx) 40 MG TABLET: 240 MG PO QDAY 30 Days #180      Follow-up:  3 Months      Next Visit Labs:  CBC, CMP      Intensive Monitor for Toxicity:  Labs Reviewed, Chemo Orders Reviewd, Meds\    Narcotics Reviewed      Labs Reviewed During Visit?:  Yes      Review/Other:  Reviewed Medicine Test, Other (referral for colonosocpy. )      ECOG Score:  0      Clinical Staging       pT1b pN1a cM0 Stage IIA            PREVENTION      Hx Influenza Vaccination:  No      Influenza Vaccine Declined:  Yes      2 or More Falls Past Year?:  No      Fall Past Year with Injury?:  No      Hx Pneumococcal Vaccination:  No      Encouraged to follow-up with:  PCP regarding preventative  exams.      Chart initiated by      JOSE MARTIN CMA                 Disclaimer: Converted document may not contain table formatting or lab diagrams. Please see [employer] [facility] for the authenticated document.

## 2021-05-28 NOTE — PROGRESS NOTES
Patient: KIKA CHRISTIE     Acct: [id number]     Report: #[id number]  UNIT #: [id number]     : 1945    Encounter Date:07/10/2018  PRIMARY CARE: JHONATAN HURT  ***Signed***  --------------------------------------------------------------------------------------------------------------------  Chief Complaint      Jul 10, 2018      (L) Breast cancer      BREAST CA , TRIPLE POSITIVE, LEFT      Intent of Therapy:  Curative            Vitals      Height 5 ft 5.08 in / 165.3 cm      Weight 227 lbs 15.290 oz / 103.4 kg      BSA 2.23 m2      BMI 37.8 kg/m2      Temperature 97.7 F / 36.5 C - Temporal      Pulse 74      Blood Pressure 154/62 Sitting, Right Arm      Pulse Oximetry 97%, ROOM AIR            General Information      Referring Provider:  XU BURRIS      Provider Not Found in Lookup:  Dr. Jhonatan Hurt      [facility] Provider 2:  Roxanne Keita      [facility] Provider 3:  Reji Wild            Allergies      Coded Allergies:             NO KNOWN DRUG ALLERGIES (Verified  Allergy, Unknown, 18)            Medications      Last Reconciled on 18 15:44 by BRYANT CURIEL      Anastrozole (Arimidex*) 1 Mg Tablet      1 MG PO QDAY, #30 TAB 5 Refills         Prov: KIRSTIN GRIJALVA         18       Neratinib Maleate (Nerlynx) 40 Mg Tablet      240 MG PO QDAY for 30 Days, #180 TAB         Reported         4/10/18       Cholecalciferol (Vitamin D*) 2,000 Unit Cap      2000 UNITS PO QDAY, #30 CAP 0 Refills         Reported         18       Ergocalciferol (Ergocalciferol*) 50,000 Unit Capsule      43644 UNITS PO Q7DAY, #4 CAP 0 Refills         Reported         18       Calcium Carb/Vit D3 (Calcium Carb 600 + D) 1 Each Tablet      1 EACH PO QDAY, #60 TAB 0 Refills         Reported         18       Metoprolol Succinate (Metoprolol Succinate*) 50 Mg Tab.er.24h      50 MG PO QDAY, #30 TAB.SR.24H 0 Refills         Reported         18       Melatonin (Melatonin) 10 Mg Tablet      10 MG PO HS, TAB          Reported         9/5/17       traZODone HCl (traZODone HCl*) 50 Mg Tablet      50 MG PO HS Y for SLEEP, #30 TAB 0 Refills         Reported         9/5/17       Aspirin (Aspirin Baby *) 81 Mg Tab.chew      81 MG PO QDAY, #30 TAB.CHEW 0 Refills         Reported         7/5/17       (vitamin b12)   No Conflict Check               Reported         4/26/17       Multivitamins W-Minerals/Lut (Daily Multi-Vitamins with Minerals) 1 Each Tablet      1 TAB PO QDAY for 30 Days, #30 TAB         Reported         4/26/17       Omeprazole (Omeprazole*) 20 Mg Tablet.dr      20 MG PO QDAY, #30 CAP 0 Refills         Reported         4/26/17       Oxybutynin Chloride (Oxybutynin Chloride) 5 Mg Tablet      5 MG PO QDAY, TAB         Reported         4/26/17       Simvastatin (Simvastatin*) 20 Mg Tablet      20 MG PO HS, #30 TAB 0 Refills         Reported         4/26/17       Hydrochlorothiazide (Hydrochlorothiazide*) 12.5 Mg Capsule      12.5 MG PO QDAY, #30 CAP 0 Refills         Reported         4/26/17       Ondansetron Hcl (Ondansetron*) 8 Mg Tablet      8 MG PO Q6H Y for NAUSEA, TAB 0 Refills         Reported         4/26/17       Diphenoxylate/Atropine (Lomotil) 1 Each Tablet      2.5 MG PO BID Y for DIARRHEA, TAB         Reported         4/26/17       Loratadine (Claritin) 10 Mg Tablet      5 MG PO QDAY Y for ALLERGIES, #15 TAB 0 Refills         Reported         4/26/17       Acetaminophen* (Tylenol Extra Strength*) 500 Mg Tablet      500 MG PO BID Y for JOINT PAIN, #100 TAB 0 Refills         Reported         4/26/17      Current Medications      Current Medications Reviewed 7/10/18            Pain and Fatigue Scales      Pain Assessment:           Experiencing any pain?:  No         Pain Scale Used:  Numerical         Pain Intensity:  0      Fatigue:           Experiencing any fatigue?:  No         Fatigue (0-10 scale):  0 (none)            Chemo Status      On Oral Chemotherapy?:  Yes      Comment:        ARIMIDEX X 5 YEARS  5/23/17      Neratinib x 1 year (1/2018)            Other      Clinical Trial Participant?:  No            PAST, FAMILY   Past Medical History      Past Medical History:  Hypertension, Sleep apnea      Hematology/oncology:  REPORTS HX OF: Breast cancer            Past Surgical History      REPORTS HX OF: Mastectomy, left, Other Past Surgical Hx            Family History      REPORTS HX OF: Blood Cancer (MOTHER), Eye Cancer (MOTHER), Leukemia (MOTHER),     Other Cancer History (BRAIN TUMOR- FATHER)            Social History      Lives independently:  Yes      Occupation:  Retired - Nurse            Tobacco Use      Tobacco status:  Former smoker      Smoking packs/day:  1/4      Quit status:  Quit date established            Alcohol Use      Alcohol intake:  0-2 drinks per day            Substance Use      Substance use:  Denies use      Counseling given:  None            Past Oncology Illness History      Ms. Cindy Rosa is a very pleasant 72-year-old  female with a recently     diagnosed left-sided breast cancer. Initial breast biopsy was performed on 10/10    /2016 with biopsy of an area of concern of calcifications in the left breast.     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 She then underwent a needle     lobe lumpectomy on 10/25/2016. The lumpectomy revealed an incidental finding of     grade 3 invasive lobular carcinoma the left breast. Estrogen receptor +70%,     progesterone receptor -0%, HER-2/young was equivocal 2+ IHC, Ki-67 10%. 5     sentinel lymph nodes were removed and one was found to contain isolated tumor     cells. Multiple margins were positive which required a reexcision. The     reexcision was performed on 11/16/2016 and was performed via total mastectomy.     [place] lymph node evaluation was also performed. The size of the largest     invasive carcinoma was 9 mm but multiple foci of invasive carcinoma were noted.     Overall grade was again grade 3. All margins were  uninvolved. Total lymph nodes     removed were 16 and unfortunately one of these was involved with macro     metastatic disease. This was staged as a pT1b pN1a stage IIA breast cancer.            She was then referred to my clinic and I saw her in the multidisciplinary     breast clinic on 12/01/2016. At that time she stated she was at her normal     baseline state of health. She did report some erythema at the left chest wall     mastectomy site. She did report a low-grade fever due to possible infection in     this area. On the date of first first clinic appointment with me on 12/01/2016     she had her surgical drain removed.            ONCOLOGICAL AND TREATMENT SUMMARY:            1. 10/10/16 - Mammogram with left breast biopsy - ([facility] path [id number]) - DCIS     11 mm, intermediate grade, triple positive, Ki-67 (25%)      2. 10/25/16 - Left breast mass and posterior medial margin excised - ([facility] path [id number]    [id number]) multifocal ILC, + margins, 1 LN +, (m)pT1b, (SN)pN0(i+)PM0      3. 11/15/16 - Total mastectomy ([facility] path [id number])      4. 12/28/17 - TCH+P chemotherapy; C6, COMPLETED 4/11/17, Herceptin x 1 year      5. 05/02/17 - DECLINED RADIATION      6. 05/23/17 - ARIMIDEX 1 MG DAILY X 5 YEARS.      7. 06/14/17 - Bone Densitometry - Spine normal, hip with osteopenia       8. 09/05/17 - Mammo right breast benign      9. 12/19/17 - Herceptin completed.      10.02/12/18 - Colonoscopy - tubular adenoma       11. 03/01/18 - Nerlynx maintenance x 1 year      12. 04/10/18 - Survivorship Appointment done            Interim History      Date:  Jul 11, 2018      Presents in scheduled follow up on her left Stage IIA triple positive breast     cancer.  She continues on Nerlynx which will continue for one full year and     will be completed in March 2018. Using Lomotil for Nerlynx associated diarrhea.      Is also on Arimidex which was started May 23, 2017.  Reports back to cancer     state of health.  Denies any issues.             REVIEW OF SYSTEMS      General:  Denies: Appetite change, Fatigue, Weight loss      Eyes:  Denies: Blurred vision, Corrective lenses      Ears, nose, mouth, throat:  Denies: Headache, Seizures, Visual Changes      Cardiovascular:  Denies: Chest pain, Irregular heartbeat, Palpitations      Respiratory:  Denies: Shortness of Air, Productive cough, Coughing blood      Gastrointestinal:  Denies: Nausea, Vomiting, Constipation, Diarrhea      Kidney/Bladder:  Denies: Painful Urination, Blood in urine      Musculoskeletal:  Denies: New Back pain, Muscle weakness      Skin:  DENIES: Easy Bleeding, Nail changes, Rash      Neurological:  Denies: Dizziness, Numbness\Tingling      Psychiatric:  Complains of: AAO X 3      Endocrine:  DiabetesThyroid Disorder      Hematologic/lymphatic:  Denies: Bruising, Bleeding, Enlarged Lymph Nodes      Reproductive:  Complains of Menopause, Denies Pregnant            EXAM      General Appearance:  Alert, Oriented X3, Cooperative, No acute distress      Eyes:  Anicteric Sclerae, Moist Conjunctiva, PERRLA      HEENT:  Orophraynx clear, No Erythema, No Exudates, No Pallor, No Thrush      Neck:  Supple, Full ROM, No Carotid Bruits      Respiratory:  CTAB, No Diminished Breath, No Crackels      Abdomen\Gastro:  Soft, No Masses      Cardio:  RRR, No Murmur, No, Peripheral Edema, Normal PMI      Skin:  Normal Temperature, Normal Tone, No Rash, lesions, ulcers      Psychiatric:  Appropriate Affect, Intact Judgement, AAO x3      Neuro:  Cranial Nerve II-XII Inta, No Focal Sensory Deficit      Muscularskeletal:  Normal Gait and Station, Full ROM of extremeties      Extremities:  No Digital Cyanosis, No Digital Ischemia, Normal Gait and station    , No Weakness      Lymphatic:  No Cervical, No Supraclavicular, No Infraclavicular, No Axillary            Radiology Results      n/a            Current Plan      I reviewed the results of her previous imaging studies which revealed a highly      concerning area of calcifications in the left breast. I reviewed her initial     lumpectomy and subsequent mastectomy pathology. I reviewed the natural history     of HER-2 positive breast cancer. I also reviewed the NCCN Invasive Breast     Cancer Version 2.2016 Guidelines.            I reviewed for invasive lobular carcinoma which had proven silvina involvement     the guideline recommendation is a category 1 recommendation for adjuvant     trastuzumab-based chemotherapy followed by endocrine therapy. I reviewed with     her that there are currently 2 guideline and recommended regimens for HER-2     positive disease. We discussed the various regimens and I recommended TCH plus     Pertuzumab in this setting. I reviewed the risks and potential complications of     chemotherapy including the increased risk of death during treatment and she     voiced understanding of this and was in agreement. In preparation for treatment     she will require an echocardiogram prior to Herceptin based treatment. She will     also require placement of a venous port cath for IV access.            Current Plan: 7/11/18      Will continue Nerlynx and Arimidex.  Is taking calcium and Vit D.  Lomotil     script refilled.  will follow up in 3 months.                She was given time to ask questions and these questions were answered. She had     no additional questions or concerns and all questions were answered to her     satisfaction.            Available for immediate release. Please forward a copy of this dictation to Dr. Roxanne Keita and [facility] BRYANT Solis and Dr. Reji Wild.            ONCOLOGICAL AND TREATMENT SUMMARY:            1. 10/10/16 - Mammogram with left breast biopsy - ([facility] path [id number]) - DCIS     11 mm, intermediate grade, triple positive, Ki-67 (25%)      2. 10/25/16 - Left breast mass and posterior medial margin excised - ([facility] path [id number]    [id number]) multifocal ILC, + margins,  1 LN +, (m)pT1b, (SN)pN0(i+)PM0      3. 11/15/16 - Total mastectomy ([facility] path [id number])      4. 12/28/17 - TCH+P chemotherapy; C6, COMPLETED 4/11/17, Herceptin x 1 year      5. 05/02/17 - DECLINED RADIATION      6. 05/23/17 - ARIMIDEX 1 MG DAILY X 5 YEARS.      7. 06/14/17 - Bone Densitometry - Spine normal, hip with osteopenia       8. 09/05/17 - Mammo right breast benign      9. 12/19/17 - Herceptin completed.      10.02/12/18 - Colonoscopy - tubular adenoma       11. 03/01/18 - Nerlynx maintenance x 1 year      12. 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     detected by sentinel lymph node dissection but not clinically apparent - C79.89    , C50.919            Hormone receptor positive cancer of left breast - C50.912            HER2-positive carcinoma of left breast - C50.912            Lobular carcinoma in situ of left breast - D05.02            Use of anastrozole (Arimidex) - Z79.811      Follow-up:  3 Months      Next Visit Labs:  CBC, CMP      Intensive Monitor for Toxicity:  Labs Reviewed, Chemo Orders Reviewd, Meds\    Narcotics Reviewed      Labs Reviewed During Visit?:  Yes      Review/Other:  Reviewed Medicine Test, Ordered Medicine Test, Other (referral     for colonosocpy. )      ECOG Score:  0      Clinical Staging       pT1b pN1a cM0 Stage IIA            PREVENTION      Hx Influenza Vaccination:  No      Influenza Vaccine Declined:  Yes      2 or More Falls Past Year?:  No      Fall Past Year with Injury?:  No      Hx Pneumococcal Vaccination:  No      Encouraged to follow-up with:  PCP regarding preventative exams.      Chart initiated by      TORI SUN CMA                 Disclaimer: Converted document may not contain table formatting or lab diagrams. Please see [facility] for the authenticated document.

## 2021-09-20 ENCOUNTER — HOSPITAL ENCOUNTER (OUTPATIENT)
Dept: MAMMOGRAPHY | Facility: HOSPITAL | Age: 76
Discharge: HOME | End: 2021-09-20
Admitting: INTERNAL MEDICINE

## 2021-09-20 DIAGNOSIS — Z12.31 VISIT FOR SCREENING MAMMOGRAM: ICD-10-CM

## 2021-09-20 PROCEDURE — 77067 SCR MAMMO BI INCL CAD: CPT

## 2021-09-22 ENCOUNTER — OFFICE VISIT (OUTPATIENT)
Dept: ONCOLOGY | Facility: HOSPITAL | Age: 76
End: 2021-09-22

## 2021-09-22 DIAGNOSIS — C50.912 PRIMARY MALIGNANT NEOPLASM OF LEFT BREAST WITH STAGE 2 NODAL METASTASIS PER AMERICAN JOINT COMMITTEE ON CANCER 7TH EDITION (N2) (HCC): Primary | ICD-10-CM

## 2021-09-22 DIAGNOSIS — C77.9 PRIMARY MALIGNANT NEOPLASM OF LEFT BREAST WITH STAGE 2 NODAL METASTASIS PER AMERICAN JOINT COMMITTEE ON CANCER 7TH EDITION (N2) (HCC): Primary | ICD-10-CM

## 2021-09-22 PROBLEM — R06.09 DYSPNEA ON EXERTION: Status: ACTIVE | Noted: 2021-09-22

## 2021-09-22 PROBLEM — K21.9 GERD (GASTROESOPHAGEAL REFLUX DISEASE): Status: ACTIVE | Noted: 2021-09-22

## 2021-09-22 PROBLEM — M19.90 ARTHRITIS: Status: ACTIVE | Noted: 2021-09-22

## 2021-09-22 PROBLEM — I10 HYPERTENSION: Status: ACTIVE | Noted: 2021-09-22

## 2021-09-22 PROBLEM — D64.9 ANEMIA: Status: ACTIVE | Noted: 2021-09-22

## 2021-09-22 PROBLEM — C50.919 BREAST CANCER: Status: ACTIVE | Noted: 2021-09-22

## 2021-09-22 PROBLEM — G47.30 SLEEP APNEA: Status: ACTIVE | Noted: 2021-09-22

## 2021-09-22 PROBLEM — R06.83 SNORING: Status: ACTIVE | Noted: 2021-09-22

## 2021-09-22 PROBLEM — L81.8: Status: ACTIVE | Noted: 2021-09-22

## 2021-09-22 PROBLEM — L81.4 MELANOSIS: Status: ACTIVE | Noted: 2021-09-22

## 2021-09-22 PROBLEM — K59.00 CONSTIPATION: Status: ACTIVE | Noted: 2021-09-22

## 2021-09-22 PROBLEM — E78.00 HIGH CHOLESTEROL: Status: ACTIVE | Noted: 2021-09-22

## 2021-09-22 PROBLEM — R09.81 NASAL CONGESTION: Status: ACTIVE | Noted: 2021-09-22

## 2021-09-22 PROCEDURE — 99442 PR PHYS/QHP TELEPHONE EVALUATION 11-20 MIN: CPT | Performed by: INTERNAL MEDICINE

## 2021-09-22 RX ORDER — ONDANSETRON 8 MG/1
TABLET, ORALLY DISINTEGRATING ORAL
COMMUNITY
End: 2022-04-05

## 2021-09-22 RX ORDER — DOCUSATE SODIUM 100 MG/1
CAPSULE, LIQUID FILLED ORAL
COMMUNITY
End: 2022-10-24 | Stop reason: SDUPTHER

## 2021-09-22 RX ORDER — DIPHENOXYLATE HYDROCHLORIDE AND ATROPINE SULFATE 2.5; .025 MG/1; MG/1
TABLET ORAL
COMMUNITY
End: 2022-10-24 | Stop reason: SDUPTHER

## 2021-09-22 RX ORDER — NAPROXEN SODIUM 220 MG
TABLET ORAL
COMMUNITY
End: 2022-04-05

## 2021-09-22 RX ORDER — IBUPROFEN 200 MG
CAPSULE ORAL
COMMUNITY

## 2021-09-22 RX ORDER — TRAZODONE HYDROCHLORIDE 50 MG/1
TABLET ORAL
COMMUNITY
End: 2022-04-05

## 2021-09-22 RX ORDER — HYDROCODONE BITARTRATE AND ACETAMINOPHEN 10; 300 MG/1; MG/1
TABLET ORAL
COMMUNITY
End: 2022-04-05

## 2021-09-22 RX ORDER — METOPROLOL SUCCINATE 50 MG/1
75 TABLET, EXTENDED RELEASE ORAL
COMMUNITY

## 2021-09-22 NOTE — PROGRESS NOTES
Cindy Rosa   : 1945     LOCATION: [facility] HEMATOLOGY & ONCOLOGY   You have chosen to receive care through a telephone visit. Do you consent to use a telephone visit for your medical care today? Yes      Chief Complaint  Breast Cancer (-telehealth)    Referring Provider: Anabel Woodson*  PCP: Anabel Woodson APRN    Oncology/Hematology History    No history exists.     History and Present Illness      Past Oncology Illness History      Ms. Rosa is a very pleasant 74-year-old  female with a recently     diagnosed left-sided breast cancer. Initial breast biopsy was performed on     10/10/2016 with biopsy of an area of concern of calcifications in the left     breast. 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 She then underwent a     needle lobe lumpectomy on 10/25/2016. The lumpectomy revealed an incidental     finding of grade 3 invasive lobular carcinoma the left breast. Estrogen receptor    +70%, progesterone receptor -0%, HER-2/young was equivocal 2+ IHC, Ki-67 10%. 5     sentinel lymph nodes were removed and one was found to contain isolated tumor     cells. Multiple margins were positive which required a reexcision. The     reexcision was performed on 2016 and was performed via total mastectomy.     Lorman lymph node evaluation was also performed. The size of the largest     invasive carcinoma was 9 mm but multiple foci of invasive carcinoma were noted.     Overall grade was again grade 3. All margins were uninvolved. Total lymph nodes     removed were 16 and unfortunately one of these was involved with macro     metastatic disease. This was staged as a pT1b pN1a stage IIA breast cancer.            HPI - Oncology Interim      This is a very pleasant 75-year-old white female who was diagnosed with left-    sided breast cancer in 2016, triple positive, stage IIA, [id number].  She     underwent left mastectomy, adjuvant chemotherapy, TCHP,  Herceptin x 1y, Nerlynx     and has been on Arimidex since 5/2017.  She is tolerating anastrozole well.  She    is not aware of any significant side effects from that.  She does complain of     mild chronic feet paresthesias but is not interested in any treatment for that.     Today she also complains of recurrent episodes of dizzy spells since last year.     The last episode was about 2 weeks ago which happened after she went to a     restaurant and after coming out of the bathroom from the restaurant and after     having had dinner.  She described the episode as feeling that the room was     spinning around her, lasting for 2 minutes and spontaneously subsiding.  The new    other the other prior episodes were similar in nature but not as intense. I     discussed with her that the symptom that she is describing is consistent with     vertigo.  We discussed the possibility of being evaluated by neurology.  She was    not interested in that at this time. However, she agreed that if the symptoms     continue to progress, we may consider a brain MRI but at this point she might be    better off pursuing neurological evaluation for vertigo.            Cancer Details            Left breast with multifocal invasive lobular carcinoma intermediate grade      ER/KY+ HER2+ Ki 67 25%            Clinical Staging      Stage IIA (T1b N1aM0)            Treatments      Chemotherapy      12/28/16 thru 4/11/17 completed 6C TCHP chemo; 12/19/17 completed 1yr Herceptin     therapy;  5/23/17 Arimidex initiated;   3/1/18 Nerlynx 240mg prescribed      Radiation Therapy      5/2/17 declined radiation therapy      Surgeries      11/16/2016 Left Mastectomy        Subjective        History of Present Illness   Pt reports that she is doing well   mammo 9/20 wnl   tolerating arimidex   taking calcium with Vit D   Has aches but uses tylenol/ ibuprofen and massages and pain is manageable    Review of Systems   Constitutional: Negative for appetite  change, diaphoresis, fatigue, fever, unexpected weight gain and unexpected weight loss.   HENT: Negative for hearing loss, mouth sores, sore throat, swollen glands, trouble swallowing and voice change.    Eyes: Negative for blurred vision.   Respiratory: Negative for cough, shortness of breath and wheezing.    Cardiovascular: Negative for chest pain and palpitations.   Gastrointestinal: Negative for abdominal pain, blood in stool, constipation, diarrhea, nausea and vomiting.   Endocrine: Negative for cold intolerance and heat intolerance.   Genitourinary: Negative for difficulty urinating, dysuria, frequency, hematuria and urinary incontinence.   Musculoskeletal: Negative for arthralgias, back pain and myalgias.   Skin: Negative for rash, skin lesions and bruise.   Neurological: Negative for dizziness, seizures, weakness, numbness and headache.   Hematological: Does not bruise/bleed easily.   Psychiatric/Behavioral: Negative for depressed mood. The patient is not nervous/anxious.    All other systems reviewed and are negative.    Current Outpatient Medications on File Prior to Visit   Medication Sig Dispense Refill   • acetaminophen (TYLENOL) 500 MG tablet Take 500 mg by mouth Every 6 (Six) Hours As Needed for Mild Pain .     • Alpha-Lipoic Acid 600 MG capsule Take 1,200 mg by mouth Every Night.     • anastrozole (ARIMIDEX) 1 MG tablet Take 1 mg by mouth Daily.     • aspirin 81 MG tablet Take 81 mg by mouth Daily.     • Biotin w/ Vitamins C & E (HAIR/SKIN/NAILS PO) Take 1 tablet by mouth Every Night.     • calcium carbonate (OS-RJ) 1250 (500 Ca) MG tablet Calcium 500 500 mg calcium (1,250 mg) oral tablet take 1 tablet by oral route daily   Active     • calcium citrate-vitamin d (CITRACAL) 200-250 MG-UNIT tablet tablet Take 1 tablet by mouth Daily.     • cholecalciferol (VITAMIN D3) 1000 units tablet Take 1,000 Units by mouth Daily.     • Cyanocobalamin (B-12) 2500 MCG sublingual tablet Place 2,500 mcg under the  tongue Daily.     • diphenoxylate-atropine (Lomotil) 2.5-0.025 MG per tablet Lomotil 2.5-0.025 mg oral tablet take 2 tablets (5 mg) by oral route once daily   Active     • docusate sodium (Colace) 100 MG capsule Colace 100 mg oral capsule take 2 capsules (200 mg) by oral route once daily at bedtime as needed   Active     • hydrochlorothiazide (HYDRODIURIL) 12.5 MG tablet Take 12.5 mg by mouth Daily.     • HYDROcodone-Acetaminophen (XODOL )  MG per tablet hydrocodone-acetaminophen  mg oral tablet take 1 tablet by oral route every 4-6 hours as needed   Active     • Loperamide HCl (IMODIUM PO) Take 4 tablets by mouth 2 (Two) Times a Day.     • Loratadine (CLARITIN) 10 MG capsule Take 10 mg by mouth Daily.     • Melatonin 10 MG tablet Take 10 mg by mouth Every Night.     • metoprolol succinate XL (TOPROL-XL) 50 MG 24 hr tablet metoprolol succinate 50 mg oral tablet extended release 24 hr take 1 tablet (50 mg) by oral route once daily   Active     • metoprolol tartrate (LOPRESSOR) 50 MG tablet Take 50 mg by mouth Every Morning.     • Multiple Vitamins-Minerals (MULTIVITAMIN ADULT PO) Take 1 tablet by mouth Daily.     • naproxen sodium (Aleve) 220 MG tablet Aleve 220 mg oral tablet take 1 tablet (220 mg) by oral route every 12 hours as needed   Active     • Neratinib Maleate (NERLYNX PO) Take 240 mg by mouth Daily.     • omeprazole (priLOSEC) 20 MG capsule Take 20 mg by mouth Daily.     • ondansetron ODT (ZOFRAN-ODT) 8 MG disintegrating tablet ondansetron 8 mg oral tablet,disintegrating take 1 tablet (8 mg) and place on top of the tongue where it will dissolve, then swallow by oral route 30 minutes before the start of chemotherapy, then 1 tablet (8 mg) eight hours after the first dose   Active     • oxybutynin (DITROPAN) 5 MG tablet Take 5 mg by mouth 3 (Three) Times a Day As Needed.     • POTASSIUM PO potassium 99 mg oral tablet take 1 tablet by oral route daily   Active     • simvastatin (ZOCOR) 20 MG  tablet Take 20 mg by mouth Every Morning.     • traZODone (DESYREL) 50 MG tablet trazodone 50 mg oral tablet take 1 tablet by oral route once a day (in the evening) as needed   Active       No current facility-administered medications on file prior to visit.       No Known Allergies    Past Medical History:   Diagnosis Date   • Breast cancer (CMS/HCC) 2016    Left mastectomy.   • Diarrhea    • Fibula fracture 3/209    LEFT   • History of breast cancer    • History of chemotherapy    • History of recent fall 2019   • Hyperlipidemia    • Hypertension    • Neuropathy    • SOB (shortness of breath) on exertion    • Urinary frequency      Past Surgical History:   Procedure Laterality Date   • ANKLE OPEN REDUCTION INTERNAL FIXATION Left 2019    Procedure: LEFT PILON AND FIBULA FRACTURE OPEN REDUCTION INTERNAL FIXATION;  Surgeon: Skinny Dupree Jr., MD;  Location: [facility];  Service: Orthopedics   • BREAST BIOPSY Left    • COLONOSCOPY     • DILATION AND CURETTAGE, DIAGNOSTIC / THERAPEUTIC     • MASTECTOMY Left    • WISDOM TOOTH EXTRACTION     • WRIST FRACTURE SURGERY Right      Social History     Socioeconomic History   • Marital status:      Spouse name: Not on file   • Number of children: Not on file   • Years of education: Not on file   • Highest education level: Not on file   Tobacco Use   • Smoking status: Former Smoker     Packs/day: 0.50     Types: Cigarettes     Quit date:      Years since quittin.7   • Smokeless tobacco: Never Used   Substance and Sexual Activity   • Alcohol use: Yes     Comment: OCC   • Drug use: No   • Sexual activity: Defer     Family History   Problem Relation Age of Onset   • Malig Hyperthermia Neg Hx      Immunization History   Administered Date(s) Administered   • COVID-19 (MODERNA) 2021, 2021       Objective     There were no vitals filed for this visit.  There is no height or weight on file to calculate BMI.     Physical  Exam          No results found for: IRON, LABIRON, TRANSFERRIN, TIBC, FERRITIN, [id number], FOLATE              PHQ-9 Total Score:           Result Review :   The following data was reviewed by: Kenisha Can MD on 09/22/2021:  Lab Results   Component Value Date    HGB 11.4 (L) 04/08/2019    HCT 33.1 (L) 04/08/2019    MCV 86.6 04/08/2019     04/08/2019    WBC 8.34 04/08/2019     Lab Results   Component Value Date    GLUCOSE 95 04/08/2019    BUN 17 04/08/2019    CREATININE 1.13 (H) 04/08/2019     (L) 04/08/2019    K 3.5 04/08/2019    CL 93 (L) 04/08/2019    CO2 24.9 04/08/2019    CALCIUM 10.1 04/08/2019    PROTEINTOT 7.0 04/08/2019    ALBUMIN 3.90 04/08/2019    BILITOT 1.0 04/08/2019    ALKPHOS 85 04/08/2019    AST 19 04/08/2019    ALT 15 04/08/2019         Data reviewed: Radiologic studies mammo          Assessment and Plan      ASSESSMENT  Left breast cancer  PLAN/RECOMMENDATIONS  Patient continues on her Arimidex  She is tolerating Arimidex except for some mild body aches.  She uses Tylenol and ibuprofen as needed  She is taking calcium and vitamin D supplementation daily  Her R mammogram done 9/20/2021 was within normal limits  Labs ordered today   f/u 6 months  Diagnoses and all orders for this visit:    1. Primary malignant neoplasm of left breast with stage 2 silvina metastasis per American Joint Committee on Cancer 7th edition (N2) (HCC) (Primary)  -     Comprehensive Metabolic Panel; Future  -     Calcium; Future  -     CBC & Differential; Future      I spent 15 minutes caring for Cindy on this date of service. This time includes time spent by me in the following activities: reviewing tests, counseling and educating the patient/family/caregiver, ordering medications, tests, or procedures, documenting information in the medical record and independently interpreting results and communicating that information with the patient/family/caregiver    Patient was given instructions and counseling regarding  her condition or for health maintenance advice. Please see specific information pulled into the AVS if appropriate.     Kenisha Can MD    9/22/2021

## 2021-10-20 ENCOUNTER — OFFICE VISIT (OUTPATIENT)
Dept: SLEEP MEDICINE | Facility: HOSPITAL | Age: 76
End: 2021-10-20

## 2021-10-20 VITALS
WEIGHT: 234 LBS | TEMPERATURE: 97 F | HEIGHT: 66 IN | OXYGEN SATURATION: 98 % | DIASTOLIC BLOOD PRESSURE: 70 MMHG | HEART RATE: 80 BPM | BODY MASS INDEX: 37.61 KG/M2 | SYSTOLIC BLOOD PRESSURE: 142 MMHG

## 2021-10-20 DIAGNOSIS — G47.33 OSA ON CPAP: Primary | ICD-10-CM

## 2021-10-20 DIAGNOSIS — E66.9 CLASS 2 OBESITY: ICD-10-CM

## 2021-10-20 DIAGNOSIS — Z99.89 OSA ON CPAP: Primary | ICD-10-CM

## 2021-10-20 PROBLEM — E66.812 CLASS 2 OBESITY: Status: ACTIVE | Noted: 2021-10-20

## 2021-10-20 PROBLEM — R06.83 SNORING: Status: RESOLVED | Noted: 2021-09-22 | Resolved: 2021-10-20

## 2021-10-20 PROCEDURE — G0463 HOSPITAL OUTPT CLINIC VISIT: HCPCS | Performed by: INTERNAL MEDICINE

## 2021-10-20 PROCEDURE — 99213 OFFICE O/P EST LOW 20 MIN: CPT | Performed by: INTERNAL MEDICINE

## 2021-10-20 NOTE — PROGRESS NOTES
"  [address] [address]  Phone: [phone]  Fax: [phone]      SLEEP CLINIC FOLLOW UP PROGRESS NOTE.    Cindy Rosa  1945  76 y.o.  female      PCP: Anabel Woodson APRN      Date of visit: 10/20/2021    Chief Complaint   Patient presents with   • Sleep Apnea   • Obesity       HPI:  This is a 76 y.o. years old patient who has a history of obstructive sleep apnea is here for  the annual compliance follow-up.  Sleep apnea is severe in severity with a AHI of 35/hr. Patient is using positive airway pressure therapy with auto CPAP and the symptoms of snoring, non-restorative sleep and daytime excessive sleepiness have improved significantly on the therapy. Normally goes to bed at 11 PM and wakes up at 7 AM.  The patient wakes up 0-2 time(s) during the night and has no problem going back to sleep.  Feels refreshed after waking up.  Patient also denies headaches and nasal congestion.   She has no problems she says that she cannot go to sleep without the CPAP.    Medications and allergies are reviewed by me and documented in the encounter.     SOCIAL ( habits pertaining to sleep medicine)  History tobacco use:No   History of alcohol use: 2 per week  Caffeine use: 4     REVIEW OF SYSTEMS:   Callery Sleepiness Scale :Total score: 3   Nasal congestion:No   Dry mouth/nose:No   Post nasal drip; No   Acid reflux/Heartburn:No   Abd bloating:No   Morning headache:No   Anxiety:No   Depression:No    PHYSICAL EXAMINATION:  CONSTITUTIONAL:  Vitals:    10/20/21 1100   BP: 142/70   Pulse: 80   Temp: 97 °F (36.1 °C)   SpO2: 98%   Weight: 106 kg (234 lb)   Height: 167.6 cm (66\")    Body mass index is 37.77 kg/m².   NOSE: nasal passages are clear, no nasal polyps, septum in the midline.  THROAT: throat is clear, oral airway Mallampati class 3  RESP SYSTEM: Breath sounds are normal, no wheezes or crackles  CARDIOVASULAR: Heart rate is regular without murmur. No " edema      Data reviewed:  The Smart card downloaded on 10/20/2021 has been reviewed independently by me for compliance and discussed the data with the patient.   Compliance; 100%  More than 4 hr use, 100%  Average use of the device 8 hours and 4-minute per night  Residual AHI: Two-point /hr (goal < 5.0 /hr)  Mask type: Nasal pillows  DME: [facility]      ASSESSMENT AND PLAN:  · Obstructive sleep apnea ( G 47.33).  The symptoms of sleep apnea have improved with the device and the treatment.  Patient's compliance with the device is excellent for treatment of sleep apnea.  I have independently reviewed the smart card down load and discussed with the patient the download data and encouarged the patient to continue to use the device.The residual AHI is acceptable. The device is benefiting the patient and the device is medically necessary.  Without proper control of sleep apnea and good compliance there is a increased risk for hypertension, diabetes mellitus and nonrestorative sleep with hypersomnia which can increase risk for motor vehicle accidents.  Untreated sleep apnea is also a risk factor for development of atrial fibrillation, pulmonary hypertension and stroke. The patient is also instructed to get the supplies from the [facility] and and change them on a regular basis.  A prescription for supplies has been sent to the [facility].  I have also discussed the good sleep hygiene habits and adequate amount of sleep needed for good health.  · Obesity, class 2 with BMI is Body mass index is 37.77 kg/m².. I have discuss the relationship between the weight and sleep apnea. The benefit of weight loss in reducing severity of sleep apnea was discussed. Discussed diet and exercise with the patient to achieve ideal BMI.  · Return in about 1 year (around 10/20/2022) for Annual visit with smartcard download. . Patient's questions were answered.        Nya Aguilera MD  Sleep Medicine.  Medical Director, [facility]  [facility]  10/20/2021 ,

## 2022-04-05 ENCOUNTER — OFFICE VISIT (OUTPATIENT)
Dept: ONCOLOGY | Facility: HOSPITAL | Age: 77
End: 2022-04-05

## 2022-04-05 VITALS
OXYGEN SATURATION: 97 % | HEART RATE: 65 BPM | BODY MASS INDEX: 37.01 KG/M2 | WEIGHT: 229.28 LBS | RESPIRATION RATE: 16 BRPM | DIASTOLIC BLOOD PRESSURE: 54 MMHG | TEMPERATURE: 97.8 F | SYSTOLIC BLOOD PRESSURE: 145 MMHG

## 2022-04-05 DIAGNOSIS — C50.912 PRIMARY MALIGNANT NEOPLASM OF LEFT BREAST WITH STAGE 2 NODAL METASTASIS PER AMERICAN JOINT COMMITTEE ON CANCER 7TH EDITION (N2): Primary | ICD-10-CM

## 2022-04-05 DIAGNOSIS — Z12.31 ENCOUNTER FOR SCREENING MAMMOGRAM FOR MALIGNANT NEOPLASM OF BREAST: ICD-10-CM

## 2022-04-05 DIAGNOSIS — C77.9 PRIMARY MALIGNANT NEOPLASM OF LEFT BREAST WITH STAGE 2 NODAL METASTASIS PER AMERICAN JOINT COMMITTEE ON CANCER 7TH EDITION (N2): Primary | ICD-10-CM

## 2022-04-05 DIAGNOSIS — Z78.0 POSTMENOPAUSAL: ICD-10-CM

## 2022-04-05 PROCEDURE — G0463 HOSPITAL OUTPT CLINIC VISIT: HCPCS | Performed by: NURSE PRACTITIONER

## 2022-04-05 PROCEDURE — 99214 OFFICE O/P EST MOD 30 MIN: CPT | Performed by: NURSE PRACTITIONER

## 2022-04-05 RX ORDER — ANASTROZOLE 1 MG/1
1 TABLET ORAL DAILY
Qty: 90 TABLET | Refills: 1 | Status: SHIPPED | OUTPATIENT
Start: 2022-04-05 | End: 2022-04-05

## 2022-04-05 RX ORDER — ROSUVASTATIN CALCIUM 10 MG/1
10 TABLET, COATED ORAL DAILY
COMMUNITY

## 2022-04-05 RX ORDER — ANASTROZOLE 1 MG/1
1 TABLET ORAL DAILY
Qty: 90 TABLET | Refills: 0 | Status: SHIPPED | OUTPATIENT
Start: 2022-04-05 | End: 2022-06-21 | Stop reason: SDUPTHER

## 2022-04-05 NOTE — PROGRESS NOTES
Chief Complaint  Breast Cancer    Anabel Woodson*  Chintan, Anabel Rock, APRN        Subjective          Cindy Rosa presents to [facility] for breast cancer follow up.     History of Present Illness   Ms. Cindy Rosa presents for 6 month  follow up for invasive left sided breast cancer. Completed chemotherapy with TCHP and 1 year of Herceptin. Completed lumpectomy and had to go back for left sided mastectomy due to positive margins. 1 LN positive for macrometastatsis. She took Neratinib for some time after the diagnosis. She has been taking the Anastrozole since May of 2017 and has tolerated well. She inquires today about taking for additional time since she had the macrometastasis noted at diagnosis. She was a stage II A at diagnosis.     She also reports recently with bilateral shoulder pain. Reports she ongoing neuropathy to her feet and recently started a supplement which seems to be helping the neuropathy.   She is due for mammogram screening of the right breast in September.   Due for bone density in June of 2022.   We discussed breast cancer index testing.     Cancer Staging  No matching staging information was found for the patient.     Treatment intent: curative    Oncology/Hematology History    No history exists.   Ms. Rosa is a very pleasant 74-year-old  female with a recently     diagnosed left-sided breast cancer. Initial breast biopsy was performed on     10/10/2016 with biopsy of an area of concern of calcifications in the left     breast. 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 She then underwent a     needle lobe lumpectomy on 10/25/2016. The lumpectomy revealed an incidental     finding of grade 3 invasive lobular carcinoma the left breast. Estrogen receptor    +70%, progesterone receptor -0%, HER-2/young was equivocal 2+ IHC, Ki-67 10%. 5     sentinel lymph nodes were removed and one was found to contain isolated tumor      cells. Multiple margins were positive which required a reexcision. The     reexcision was performed on 11/16/2016 and was performed via total mastectomy.     [place] lymph node evaluation was also performed. The size of the largest     invasive carcinoma was 9 mm but multiple foci of invasive carcinoma were noted.     Overall grade was again grade 3. All margins were uninvolved. Total lymph nodes     removed were 16 and unfortunately one of these was involved with macro     metastatic disease. This was staged as a pT1b pN1a stage IIA breast cancer.        Review of Systems   Constitutional: Negative for appetite change, diaphoresis, fatigue, fever, unexpected weight gain and unexpected weight loss.   HENT: Negative for hearing loss, mouth sores, sore throat, swollen glands, trouble swallowing and voice change.    Eyes: Negative for blurred vision.   Respiratory: Negative for cough, shortness of breath and wheezing.    Cardiovascular: Negative for chest pain and palpitations.   Gastrointestinal: Negative for abdominal pain, blood in stool, constipation, diarrhea, nausea and vomiting.   Endocrine: Negative for cold intolerance and heat intolerance.   Genitourinary: Negative for difficulty urinating, dysuria, frequency, hematuria and urinary incontinence.   Musculoskeletal: Negative for arthralgias, back pain and myalgias.   Skin: Negative for rash, skin lesions and wound.   Neurological: Negative for dizziness, seizures, weakness, numbness and headache.   Hematological: Does not bruise/bleed easily.   Psychiatric/Behavioral: Negative for depressed mood. The patient is not nervous/anxious.    All other systems reviewed and are negative.      Current Outpatient Medications on File Prior to Visit   Medication Sig Dispense Refill   • acetaminophen (TYLENOL) 500 MG tablet Take 500 mg by mouth Every 6 (Six) Hours As Needed for Mild Pain .     • Alpha-Lipoic Acid 600 MG capsule Take 1,200 mg by mouth Every Night.     •  aspirin 81 MG tablet Take 81 mg by mouth Daily.     • Biotin w/ Vitamins C & E (HAIR/SKIN/NAILS PO) Take 1 tablet by mouth Every Night.     • calcium carbonate (OS-RJ) 1250 (500 Ca) MG tablet Calcium 500 500 mg calcium (1,250 mg) oral tablet take 1 tablet by oral route daily   Active     • calcium citrate-vitamin d (CITRACAL) 200-250 MG-UNIT tablet tablet Take 1 tablet by mouth Daily.     • cholecalciferol (VITAMIN D3) 1000 units tablet Take 1,000 Units by mouth Daily.     • Cyanocobalamin (B-12) 2500 MCG sublingual tablet Place 2,500 mcg under the tongue Daily.     • hydrochlorothiazide (HYDRODIURIL) 12.5 MG tablet Take 12.5 mg by mouth Daily.     • Loperamide HCl (IMODIUM PO) Take 4 tablets by mouth 2 (Two) Times a Day.     • Loratadine 10 MG capsule Take 10 mg by mouth Daily.     • Melatonin 10 MG tablet Take 10 mg by mouth Every Night.     • metoprolol succinate XL (TOPROL-XL) 50 MG 24 hr tablet 75 mg.     • Multiple Vitamins-Minerals (MULTIVITAMIN ADULT PO) Take 1 tablet by mouth Daily.     • Neratinib Maleate (NERLYNX PO) Take 240 mg by mouth Daily.     • omeprazole (priLOSEC) 20 MG capsule Take 20 mg by mouth Daily.     • oxybutynin (DITROPAN) 5 MG tablet Take 5 mg by mouth 3 (Three) Times a Day As Needed.     • rosuvastatin (CRESTOR) 10 MG tablet Take 10 mg by mouth Daily.     • [DISCONTINUED] anastrozole (ARIMIDEX) 1 MG tablet Take 1 mg by mouth Daily.     • [DISCONTINUED] metoprolol tartrate (LOPRESSOR) 50 MG tablet Take 50 mg by mouth Every Morning.     • [DISCONTINUED] traZODone (DESYREL) 50 MG tablet trazodone 50 mg oral tablet take 1 tablet by oral route once a day (in the evening) as needed   Active     • diphenoxylate-atropine (LOMOTIL) 2.5-0.025 MG per tablet Lomotil 2.5-0.025 mg oral tablet take 2 tablets (5 mg) by oral route once daily   Active     • docusate sodium (COLACE) 100 MG capsule Colace 100 mg oral capsule take 2 capsules (200 mg) by oral route once daily at bedtime as needed   Active      • [DISCONTINUED] HYDROcodone-Acetaminophen (XODOL )  MG per tablet hydrocodone-acetaminophen  mg oral tablet take 1 tablet by oral route every 4-6 hours as needed   Active     • [DISCONTINUED] naproxen sodium (ALEVE) 220 MG tablet Aleve 220 mg oral tablet take 1 tablet (220 mg) by oral route every 12 hours as needed   Active     • [DISCONTINUED] ondansetron ODT (ZOFRAN-ODT) 8 MG disintegrating tablet ondansetron 8 mg oral tablet,disintegrating take 1 tablet (8 mg) and place on top of the tongue where it will dissolve, then swallow by oral route 30 minutes before the start of chemotherapy, then 1 tablet (8 mg) eight hours after the first dose   Active     • [DISCONTINUED] POTASSIUM PO potassium 99 mg oral tablet take 1 tablet by oral route daily   Active     • [DISCONTINUED] simvastatin (ZOCOR) 20 MG tablet Take 20 mg by mouth Every Morning.       No current facility-administered medications on file prior to visit.       No Known Allergies  Past Medical History:   Diagnosis Date   • Breast cancer (HCC) 2016    Left mastectomy.   • Diarrhea    • Fibula fracture 3/209    LEFT   • History of breast cancer 2016   • History of chemotherapy 2016   • History of recent fall 03/2019   • Hyperlipidemia    • Hypertension    • Neuropathy    • SOB (shortness of breath) on exertion    • Urinary frequency      Past Surgical History:   Procedure Laterality Date   • ANKLE OPEN REDUCTION INTERNAL FIXATION Left 4/8/2019    Procedure: LEFT PILON AND FIBULA FRACTURE OPEN REDUCTION INTERNAL FIXATION;  Surgeon: Skinny Dupree Jr., MD;  Location: [facility];  Service: Orthopedics   • BREAST BIOPSY Left    • COLONOSCOPY     • DILATION AND CURETTAGE, DIAGNOSTIC / THERAPEUTIC     • MASTECTOMY Left 2016   • WISDOM TOOTH EXTRACTION     • WRIST FRACTURE SURGERY Right      Social History     Socioeconomic History   • Marital status:    Tobacco Use   • Smoking status: Former Smoker     Packs/day: 0.50     Types:  Cigarettes     Quit date:      Years since quittin.2   • Smokeless tobacco: Never Used   Substance and Sexual Activity   • Alcohol use: Yes     Comment: OCC   • Drug use: No   • Sexual activity: Defer     Family History   Problem Relation Age of Onset   • Malig Hyperthermia Neg Hx      Immunization History   Administered Date(s) Administered   • COVID-19 (MODERNA) 1st, 2nd, 3rd Dose Only 2021, 2021       Objective   Physical Exam  Vitals and nursing note reviewed.   Constitutional:       Appearance: She is obese.   HENT:      Head: Normocephalic.      Nose: Nose normal.      Mouth/Throat:      Mouth: Mucous membranes are moist.   Eyes:      Pupils: Pupils are equal, round, and reactive to light.   Cardiovascular:      Rate and Rhythm: Normal rate and regular rhythm.      Pulses: Normal pulses.      Heart sounds: Normal heart sounds.   Pulmonary:      Effort: Pulmonary effort is normal.      Breath sounds: Normal breath sounds.   Abdominal:      Palpations: Abdomen is soft.   Musculoskeletal:         General: Normal range of motion.      Cervical back: Normal range of motion and neck supple.   Skin:     General: Skin is warm and dry.      Capillary Refill: Capillary refill takes less than 2 seconds.   Neurological:      General: No focal deficit present.      Mental Status: She is alert and oriented to person, place, and time.   Psychiatric:         Mood and Affect: Mood normal.         Behavior: Behavior normal.         Thought Content: Thought content normal.         Judgment: Judgment normal.       Breast exam. Left mastectomy site without any palpable mass or lump noted. No palpable mass, lump or adenopathy noted to the right breast or axillary area.     Vitals:    22 1143   BP: 145/54   Pulse: 65   Resp: 16   Temp: 97.8 °F (36.6 °C)   SpO2: 97%   Weight: 104 kg (229 lb 4.5 oz)   PainSc: 0-No pain     ECOG score: 0         ECOG: (0) Fully Active - Able to Carry On All Pre-disease  Performance Without Restriction  Fall Risk Assessment was completed, and patient is at low risk for falls.  PHQ-9 Total Score: 0       The patient is not  experiencing fatigue. Fatigue score: 0    PT/OT Functional Screening: PT fx screen: No needs identified  Speech Functional Screening: Speech fx screen: No needs identified  Rehab to be ordered: Rehab to be ordered: No needs identified        Result Review :   The following data was reviewed by: BRYANT Cheney on 04/05/2022:  Lab Results   Component Value Date    HGB 11.4 (L) 04/08/2019    HCT 33.1 (L) 04/08/2019    MCV 86.6 04/08/2019     04/08/2019    WBC 8.34 04/08/2019     Lab Results   Component Value Date    GLUCOSE 95 04/08/2019    BUN 17 04/08/2019    CREATININE 1.13 (H) 04/08/2019     (L) 04/08/2019    K 3.5 04/08/2019    CL 93 (L) 04/08/2019    CO2 24.9 04/08/2019    CALCIUM 10.1 04/08/2019    PROTEINTOT 7.0 04/08/2019    ALBUMIN 3.90 04/08/2019    BILITOT 1.0 04/08/2019    ALKPHOS 85 04/08/2019    AST 19 04/08/2019    ALT 15 04/08/2019          Assessment and Plan    Diagnoses and all orders for this visit:    1. Primary malignant neoplasm of left breast with stage 2 [place] metastasis per American Joint Committee on Cancer 7th edition (N2) (HCC) (Primary)  -     Discontinue: anastrozole (ARIMIDEX) 1 MG tablet; Take 1 tablet by mouth Daily.  Dispense: 90 tablet; Refill: 1  -     anastrozole (ARIMIDEX) 1 MG tablet; Take 1 tablet by mouth Daily.  Dispense: 90 tablet; Refill: 0    2. Encounter for screening mammogram for malignant neoplasm of breast  -     Mammo Screening Right With CAD; Future    3. Postmenopausal  -     DEXA Bone Density Axial; Future      Continue Anastrozole for now. Refill sent for Anastrozole.  Will plan for BCI testing from original specimen. Will call with results.     Plan for right sided mammogram in September. Bone density in June.   Follow up in 6 months with Dr. Dent.         Patient Follow Up: 6  months.     Patient was given instructions and counseling regarding her condition or for health maintenance advice. Please see specific information pulled into the AVS if appropriate.     Katja Moe, APRN    4/5/2022

## 2022-04-12 ENCOUNTER — HOSPITAL ENCOUNTER (OUTPATIENT)
Dept: BONE DENSITY | Facility: HOSPITAL | Age: 77
Discharge: HOME | End: 2022-04-12
Admitting: NURSE PRACTITIONER

## 2022-04-12 DIAGNOSIS — Z78.0 POSTMENOPAUSAL: ICD-10-CM

## 2022-04-12 PROCEDURE — 77080 DXA BONE DENSITY AXIAL: CPT

## 2022-04-14 ENCOUNTER — TELEPHONE (OUTPATIENT)
Dept: ONCOLOGY | Facility: HOSPITAL | Age: 77
End: 2022-04-14

## 2022-04-14 NOTE — TELEPHONE ENCOUNTER
Caller: SANDRA    Relationship: Saranas    Best call back number: [phone]    What is the best time to reach you: ANY    Who are you requesting to speak with (clinical staff, provider,  specific staff member): CLINICAL STAFF    What was the call regarding: CALLING IN REGARD TO THE BREAST CANCER INDEX ORDERS THEY RECEIVED. THEY NEED ANOTHER BREAST BX REPORT SENT OVER, CANNOT TEST ON THE ONE THAT WAS SUPPLIED    Do you require a callback: YES

## 2022-06-21 DIAGNOSIS — C50.912 PRIMARY MALIGNANT NEOPLASM OF LEFT BREAST WITH STAGE 2 NODAL METASTASIS PER AMERICAN JOINT COMMITTEE ON CANCER 7TH EDITION (N2): ICD-10-CM

## 2022-06-21 DIAGNOSIS — C77.9 PRIMARY MALIGNANT NEOPLASM OF LEFT BREAST WITH STAGE 2 NODAL METASTASIS PER AMERICAN JOINT COMMITTEE ON CANCER 7TH EDITION (N2): ICD-10-CM

## 2022-06-21 RX ORDER — ANASTROZOLE 1 MG/1
1 TABLET ORAL DAILY
Qty: 90 TABLET | Refills: 1 | Status: SHIPPED | OUTPATIENT
Start: 2022-06-21 | End: 2022-10-24 | Stop reason: SDUPTHER

## 2022-06-21 NOTE — TELEPHONE ENCOUNTER
Caller: Shelley Cindy RAFAEL    Relationship: Self    Best call back number: [phone]    Requested Prescriptions:   Requested Prescriptions     Pending Prescriptions Disp Refills   • anastrozole (ARIMIDEX) 1 MG tablet 90 tablet 0     Sig: Take 1 tablet by mouth Daily.        Pharm[facility] where request should be sent: [facility] [phone]  - [phone]     Additional details provided by patient: PT STATED THAT SHE HAD THE BREAST CANCER INDEX TEST PERFORMED 5/4/22 AND WANTED TO KNOW IF SHE WOULD BE DISCONTINUING THE ANASTRAZOLE/ARIMIDEX OR CONTINUING IT AT THIS TIME. IF SHE CONTINUES SHE WILL NEED IT REFILLED.      PLEASE CALL PT TO ADVISE - CAN LEAVE DETAILED VM IF NO ANSWER.     Does the patient have less than a 3 day supply:  [x] Yes  [] No

## 2022-06-28 NOTE — TELEPHONE ENCOUNTER
Caller: Cindy Rosa    Relationship: Self    Best call back number: [phone]    Who are you requesting to speak with (clinical staff, provider,  specific staff member): CLINICAL    What was the call regarding:PATIENT STILL NEEDS TO CLARIFY IF SHE IS TO CONTINUE OR DISCONTINUE ARIMIDEX MEDICATION     Do you require a callback: YES

## 2022-09-21 ENCOUNTER — HOSPITAL ENCOUNTER (OUTPATIENT)
Dept: MAMMOGRAPHY | Facility: HOSPITAL | Age: 77
Discharge: HOME | End: 2022-09-21
Admitting: NURSE PRACTITIONER

## 2022-09-21 DIAGNOSIS — Z12.31 ENCOUNTER FOR SCREENING MAMMOGRAM FOR MALIGNANT NEOPLASM OF BREAST: ICD-10-CM

## 2022-09-21 PROCEDURE — 77067 SCR MAMMO BI INCL CAD: CPT

## 2022-10-24 ENCOUNTER — OFFICE VISIT (OUTPATIENT)
Dept: SLEEP MEDICINE | Facility: HOSPITAL | Age: 77
End: 2022-10-24

## 2022-10-24 ENCOUNTER — OFFICE VISIT (OUTPATIENT)
Dept: ONCOLOGY | Facility: HOSPITAL | Age: 77
End: 2022-10-24

## 2022-10-24 VITALS
OXYGEN SATURATION: 94 % | WEIGHT: 204.6 LBS | SYSTOLIC BLOOD PRESSURE: 137 MMHG | HEART RATE: 67 BPM | DIASTOLIC BLOOD PRESSURE: 56 MMHG | HEIGHT: 66 IN | BODY MASS INDEX: 32.88 KG/M2

## 2022-10-24 VITALS
RESPIRATION RATE: 16 BRPM | WEIGHT: 204.37 LBS | TEMPERATURE: 97.7 F | BODY MASS INDEX: 32.99 KG/M2 | HEART RATE: 62 BPM | DIASTOLIC BLOOD PRESSURE: 61 MMHG | OXYGEN SATURATION: 99 % | SYSTOLIC BLOOD PRESSURE: 138 MMHG

## 2022-10-24 DIAGNOSIS — Z17.0 MALIGNANT NEOPLASM OF LEFT BREAST IN FEMALE, ESTROGEN RECEPTOR POSITIVE, UNSPECIFIED SITE OF BREAST: Primary | ICD-10-CM

## 2022-10-24 DIAGNOSIS — G47.33 OSA ON CPAP: Primary | ICD-10-CM

## 2022-10-24 DIAGNOSIS — E66.9 CLASS 2 OBESITY: ICD-10-CM

## 2022-10-24 DIAGNOSIS — C50.912 MALIGNANT NEOPLASM OF LEFT BREAST IN FEMALE, ESTROGEN RECEPTOR POSITIVE, UNSPECIFIED SITE OF BREAST: Primary | ICD-10-CM

## 2022-10-24 DIAGNOSIS — Z99.89 OSA ON CPAP: Primary | ICD-10-CM

## 2022-10-24 PROCEDURE — G0463 HOSPITAL OUTPT CLINIC VISIT: HCPCS | Performed by: INTERNAL MEDICINE

## 2022-10-24 PROCEDURE — 99213 OFFICE O/P EST LOW 20 MIN: CPT | Performed by: INTERNAL MEDICINE

## 2022-10-24 PROCEDURE — G0463 HOSPITAL OUTPT CLINIC VISIT: HCPCS

## 2022-10-24 RX ORDER — AMLODIPINE BESYLATE 5 MG/1
5 TABLET ORAL DAILY
COMMUNITY

## 2022-10-24 RX ORDER — ANASTROZOLE 1 MG/1
1 TABLET ORAL DAILY
Qty: 90 TABLET | Refills: 1 | Status: SHIPPED | OUTPATIENT
Start: 2022-10-24

## 2022-10-24 NOTE — ASSESSMENT & PLAN NOTE
Patient is on extended adjuvant aromatase inhibitor therapy.  Breast cancer index did not suggest high risk of recurrence the patient wishes to complete 10 full years of therapy.  Given her lack of symptoms for medicine, this is reasonable.  Refill provided today.  I will see her back with her mammogram prior

## 2022-10-24 NOTE — PROGRESS NOTES
Chief Complaint  Breast Cancer    Anabel Woodson*  Anabel Woodson, APRN    Subjective          Cindy Rosa presents to [facility] for follow-up of her breast cancer.  She is HER2 positive and hormone receptor positive.  She is currently on extended adjuvant aromatase inhibitor therapy with Arimidex.  Tolerating well.  She denies any side effects or issues.  She denies new masses or adenopathy, no unusual pains.  She is up-to-date on mammogram.  She is trying exercise.    Oncology/Hematology History Overview Note   2016 Left breast with multifocal invasive lobular carcinoma intermediate grade  ER/WA+ HER2+ Ki 67 25%            Clinical Staging      Stage IIA (T1b N1aM0)            Treatments      Chemotherapy      12/28/16 thru 4/11/17 completed 6C TCHP chemo; 12/19/17 completed 1yr Herceptin   therapy;    5/23/17 Arimidex initiated;   3/1/18 Nerlynx 240mg prescribed      10/24/22 Breast Cancer Index did not recommend 10 years of AI. Patient wants to continue Arimidex.    Radiation Therapy      5/2/17 declined radiation therapy        Surgeries      11/16/2016 Left Mastectomy         Malignant neoplasm of left breast in female, estrogen receptor positive (HCC)   9/22/2021 Initial Diagnosis    Breast cancer (HCC)         Review of Systems   Constitutional: Negative for appetite change, diaphoresis, fatigue, fever, unexpected weight gain and unexpected weight loss.   HENT: Negative for hearing loss, mouth sores, sore throat, swollen glands, trouble swallowing and voice change.    Eyes: Negative for blurred vision.   Respiratory: Negative for cough, shortness of breath and wheezing.    Cardiovascular: Negative for chest pain and palpitations.   Gastrointestinal: Negative for abdominal pain, blood in stool, constipation, diarrhea, nausea and vomiting.   Endocrine: Negative for cold intolerance and heat intolerance.   Genitourinary: Negative for difficulty  urinating, dysuria, frequency, hematuria and urinary incontinence.   Musculoskeletal: Negative for arthralgias, back pain and myalgias.   Skin: Negative for rash, skin lesions and wound.   Neurological: Negative for dizziness, seizures, weakness, numbness and headache.   Hematological: Does not bruise/bleed easily.   Psychiatric/Behavioral: Negative for depressed mood. The patient is not nervous/anxious.      Current Outpatient Medications on File Prior to Visit   Medication Sig Dispense Refill   • acetaminophen (TYLENOL) 500 MG tablet Take 500 mg by mouth Every 6 (Six) Hours As Needed for Mild Pain .     • Alpha-Lipoic Acid 600 MG capsule Take 1,200 mg by mouth Every Night.     • amLODIPine (NORVASC) 5 MG tablet Take 1 tablet by mouth Daily.     • aspirin 81 MG tablet Take 81 mg by mouth Daily.     • Biotin w/ Vitamins C & E (HAIR/SKIN/NAILS PO) Take 1 tablet by mouth Every Night.     • calcium carbonate (OS-RJ) 1250 (500 Ca) MG tablet Calcium 500 500 mg calcium (1,250 mg) oral tablet take 1 tablet by oral route daily   Active     • calcium citrate-vitamin d (CITRACAL) 200-250 MG-UNIT tablet tablet Take 1 tablet by mouth Daily.     • cholecalciferol (VITAMIN D3) 1000 units tablet Take 1,000 Units by mouth Daily.     • Cyanocobalamin (B-12) 2500 MCG sublingual tablet Place 2,500 mcg under the tongue Daily.     • hydrochlorothiazide (HYDRODIURIL) 12.5 MG tablet Take 12.5 mg by mouth Daily.     • Loperamide HCl (IMODIUM PO) Take 4 tablets by mouth 2 (Two) Times a Day.     • Loratadine 10 MG capsule Take 10 mg by mouth Daily.     • Melatonin 10 MG tablet Take 10 mg by mouth Every Night.     • metoprolol succinate XL (TOPROL-XL) 50 MG 24 hr tablet 75 mg.     • Multiple Vitamins-Minerals (MULTIVITAMIN ADULT PO) Take 1 tablet by mouth Daily.     • omeprazole (priLOSEC) 20 MG capsule Take 20 mg by mouth Daily.     • oxybutynin (DITROPAN) 5 MG tablet Take 5 mg by mouth 3 (Three) Times a Day As Needed.     • rosuvastatin  (CRESTOR) 10 MG tablet Take 10 mg by mouth Daily.     • [DISCONTINUED] anastrozole (ARIMIDEX) 1 MG tablet Take 1 tablet by mouth Daily. 90 tablet 1   • [DISCONTINUED] diphenoxylate-atropine (LOMOTIL) 2.5-0.025 MG per tablet Lomotil 2.5-0.025 mg oral tablet take 2 tablets (5 mg) by oral route once daily   Active     • [DISCONTINUED] docusate sodium (COLACE) 100 MG capsule Colace 100 mg oral capsule take 2 capsules (200 mg) by oral route once daily at bedtime as needed   Active     • [DISCONTINUED] Neratinib Maleate (NERLYNX PO) Take 240 mg by mouth Daily.       No current facility-administered medications on file prior to visit.       No Known Allergies  Past Medical History:   Diagnosis Date   • Breast cancer (HCC) 2016    Left mastectomy.   • Diarrhea    • Fibula fracture 3/209    LEFT   • History of breast cancer    • History of chemotherapy    • History of recent fall 2019   • Hyperlipidemia    • Hypertension    • Malignant neoplasm of left breast in female, estrogen receptor positive (HCC) 2021   • Neuropathy    • SOB (shortness of breath) on exertion    • Urinary frequency      Past Surgical History:   Procedure Laterality Date   • ANKLE OPEN REDUCTION INTERNAL FIXATION Left 2019    Procedure: LEFT PILON AND FIBULA FRACTURE OPEN REDUCTION INTERNAL FIXATION;  Surgeon: Skinny Dupree Jr., MD;  Location: [facility];  Service: Orthopedics   • BREAST BIOPSY Left    • COLONOSCOPY     • DILATION AND CURETTAGE, DIAGNOSTIC / THERAPEUTIC     • MASTECTOMY Left    • WISDOM TOOTH EXTRACTION     • WRIST FRACTURE SURGERY Right      Social History     Socioeconomic History   • Marital status:    Tobacco Use   • Smoking status: Former     Packs/day: 0.50     Types: Cigarettes     Quit date:      Years since quittin.8   • Smokeless tobacco: Never   Vaping Use   • Vaping Use: Never used   Substance and Sexual Activity   • Alcohol use: Yes     Comment: OCC   • Drug use: No   • Sexual  activity: Defer     Family History   Problem Relation Age of Onset   • Malig Hyperthermia Neg Hx        Objective   Physical Exam  Vitals reviewed. Exam conducted with a chaperone present.   Constitutional:       General: She is not in acute distress.     Appearance: Normal appearance.   Cardiovascular:      Rate and Rhythm: Normal rate and regular rhythm.      Heart sounds: Normal heart sounds. No murmur heard.    No gallop.   Pulmonary:      Effort: Pulmonary effort is normal.      Breath sounds: Normal breath sounds.   Chest:   Breasts:     Right: Normal.      Left: Absent.       Abdominal:      General: Abdomen is flat. Bowel sounds are normal.      Palpations: Abdomen is soft.   Musculoskeletal:      Cervical back: Neck supple.      Right lower leg: No edema.      Left lower leg: No edema.   Lymphadenopathy:      Cervical: No cervical adenopathy.      Upper Body:      Right upper body: No supraclavicular or axillary adenopathy.      Left upper body: No supraclavicular or axillary adenopathy.   Neurological:      Mental Status: She is alert and oriented to person, place, and time.   Psychiatric:         Mood and Affect: Mood normal.         Behavior: Behavior normal.         Vitals:    10/24/22 0922   BP: 138/61   Pulse: 62   Resp: 16   Temp: 97.7 °F (36.5 °C)   SpO2: 99%   Weight: 92.7 kg (204 lb 5.9 oz)   PainSc: 0-No pain     ECOG score: 0         PHQ-9 Total Score:                    Result Review :   The following data was reviewed by: Payam Dent MD on 10/24/2022:  Lab Results   Component Value Date    HGB 11.4 (L) 04/08/2019    HCT 33.1 (L) 04/08/2019    MCV 86.6 04/08/2019     04/08/2019    WBC 8.34 04/08/2019     Lab Results   Component Value Date    GLUCOSE 95 04/08/2019    BUN 17 04/08/2019    CREATININE 1.13 (H) 04/08/2019     (L) 04/08/2019    K 3.5 04/08/2019    CL 93 (L) 04/08/2019    CO2 24.9 04/08/2019    CALCIUM 10.1 04/08/2019    PROTEINTOT 7.0 04/08/2019    ALBUMIN 3.90  04/08/2019    BILITOT 1.0 04/08/2019    ALKPHOS 85 04/08/2019    AST 19 04/08/2019    ALT 15 04/08/2019     No results found for: MG, PHOS, FREET4, TSH          Assessment and Plan    Diagnoses and all orders for this visit:    1. Malignant neoplasm of left breast in female, estrogen receptor positive, unspecified site of breast (HCC) (Primary)  Assessment & Plan:  Patient is on extended adjuvant aromatase inhibitor therapy.  Breast cancer index did not suggest high risk of recurrence the patient wishes to complete 10 full years of therapy.  Given her lack of symptoms for medicine, this is reasonable.  Refill provided today.  I will see her back with her mammogram prior    Orders:  -     anastrozole (ARIMIDEX) 1 MG tablet; Take 1 tablet by mouth Daily.  Dispense: 90 tablet; Refill: 1            Patient was given instructions and counseling regarding her condition or for health maintenance advice. Please see specific information pulled into the AVS if appropriate.     Payam Dent MD    10/24/2022

## 2022-10-24 NOTE — PROGRESS NOTES
"  [address] [address]  Phone: [phone]  Fax: [phone]      SLEEP CLINIC FOLLOW UP PROGRESS NOTE.    Cindy Rosa  [id number]   1945  77 y.o.  female      PCP: Anabel Woodson APRN      Date of visit: 10/24/2022    Chief Complaint   Patient presents with   • Sleep Apnea   • Obesity       HPI:  This is a 77 y.o. years old patient is here for the management of obstructive sleep apnea.  Sleep apnea is severe in severity with a AHI of 35/hr. Patient is using positive airway pressure therapy with APAP and the symptoms of snoring, non-restorative sleep and daytime excessive sleepiness have improved significantly on the therapy. Normally goes to bed at 10 and wakes up at 7 AM.  The patient wakes up 0-2 time(s) during the night and has no problem going back to sleep.  Feels refreshed after waking up.  Patient also denies headaches and nasal congestion.  She received her DreamStation 2.     Medications and allergies are reviewed by me and documented in the encounter.     SOCIAL (habits pertaining to sleep medicine)  History tobacco use:No   History of alcohol use: 1 per week  Caffeine use: 5     REVIEW OF SYSTEMS:   [place] Sleepiness Scale :Total score: 0   Nasal congestion:No   Dry mouth/nose:No   Post nasal drip; No   Acid reflux/Heartburn:No   Abd bloating:No   Morning headache:No   Anxiety:No   Depression:No    PHYSICAL EXAMINATION:  CONSTITUTIONAL:  Vitals:    10/24/22 1029   BP: 137/56   Pulse: 67   SpO2: 94%   Weight: 92.8 kg (204 lb 9.6 oz)   Height: 167.6 cm (65.98\")    Body mass index is 33.04 kg/m².   NOSE: nasal passages are clear, No deformities noted   RESP SYSTEM: Not in any respiratory distress, no chest deformities noted,   CARDIOVASULAR: No edema noted  NEURO: Oriented x 3, gait normal,  Mood and affect appeared appropriate      Data reviewed:  The Smart card downloaded on 10/24/2022 has been reviewed independently by me " for compliance and discussed the data with the patient.   Compliance; 97%  More than 4 hr use, 97%  Average use of the device 8 hours and 14 per night  Residual AHI: 2.3 /hr (goal < 5.0 /hr)  Mask type: Nasal pillow  Device: DreamStation 2  DME: [employer]      ASSESSMENT AND PLAN:  · Obstructive sleep apnea ( G 47.33).  The symptoms of sleep apnea have improved with the device and the treatment.  Patient's compliance with the device is excellent for treatment of sleep apnea.  I have independently reviewed the smart card down load and discussed with the patient the download data and encouarged the patient to continue to use the device.The residual AHI is acceptable. The device is benefiting the patient and the device is medically necessary.  Without proper control of sleep apnea and good compliance there is a increased risk for hypertension, diabetes mellitus and nonrestorative sleep with hypersomnia which can increase risk for motor vehicle accidents.  Untreated sleep apnea is also a risk factor for development of atrial fibrillation, pulmonary hypertension and stroke. The patient is also instructed to get the supplies from the [facility] and and change them on a regular basis.  A prescription for supplies has been sent to the [facility].  I have also discussed the good sleep hygiene habits and adequate amount of sleep needed for good health.  · Obesity  1 with BMI is Body mass index is 33.04 kg/m².. I have discuss the relationship between the weight and sleep apnea. The benefit of weight loss in reducing severity of sleep apnea was discussed. Discussed diet and exercise with the patient to achieve ideal BMI.   · Return in about 1 year (around 10/24/2023) for with smart card down load. . Patient's questions were answered.      Nya Aguilera MD  Sleep Medicine.  Medical Director, [facility] sleep [facility]  10/24/2022 ,

## 2023-04-19 DIAGNOSIS — Z17.0 MALIGNANT NEOPLASM OF LEFT BREAST IN FEMALE, ESTROGEN RECEPTOR POSITIVE, UNSPECIFIED SITE OF BREAST: ICD-10-CM

## 2023-04-19 DIAGNOSIS — C50.912 MALIGNANT NEOPLASM OF LEFT BREAST IN FEMALE, ESTROGEN RECEPTOR POSITIVE, UNSPECIFIED SITE OF BREAST: ICD-10-CM

## 2023-04-19 RX ORDER — ANASTROZOLE 1 MG/1
TABLET ORAL
Qty: 90 TABLET | Refills: 1 | Status: SHIPPED | OUTPATIENT
Start: 2023-04-19

## 2023-04-24 ENCOUNTER — OFFICE VISIT (OUTPATIENT)
Dept: ONCOLOGY | Facility: HOSPITAL | Age: 78
End: 2023-04-24
Payer: MEDICARE

## 2023-04-24 VITALS
TEMPERATURE: 98.6 F | OXYGEN SATURATION: 98 % | DIASTOLIC BLOOD PRESSURE: 72 MMHG | BODY MASS INDEX: 35.6 KG/M2 | RESPIRATION RATE: 18 BRPM | SYSTOLIC BLOOD PRESSURE: 153 MMHG | HEART RATE: 60 BPM | WEIGHT: 220.46 LBS

## 2023-04-24 DIAGNOSIS — Z17.0 MALIGNANT NEOPLASM OF LEFT BREAST IN FEMALE, ESTROGEN RECEPTOR POSITIVE, UNSPECIFIED SITE OF BREAST: Primary | ICD-10-CM

## 2023-04-24 DIAGNOSIS — Z12.31 ENCOUNTER FOR SCREENING MAMMOGRAM FOR MALIGNANT NEOPLASM OF BREAST: ICD-10-CM

## 2023-04-24 DIAGNOSIS — C50.912 MALIGNANT NEOPLASM OF LEFT BREAST IN FEMALE, ESTROGEN RECEPTOR POSITIVE, UNSPECIFIED SITE OF BREAST: Primary | ICD-10-CM

## 2023-04-24 DIAGNOSIS — M85.851 OSTEOPENIA OF NECKS OF BOTH FEMURS: ICD-10-CM

## 2023-04-24 DIAGNOSIS — M85.852 OSTEOPENIA OF NECKS OF BOTH FEMURS: ICD-10-CM

## 2023-04-24 PROCEDURE — G0463 HOSPITAL OUTPT CLINIC VISIT: HCPCS | Performed by: INTERNAL MEDICINE

## 2023-04-24 NOTE — ASSESSMENT & PLAN NOTE
DEXA scan reviewed showing worsening osteopenia, FRAX score 15%.  She does take calcium and vitamin D daily.  We discussed routine exercise including weightbearing.  We discussed treatment for osteopenia with FRAX score >10.  She is currently undergoing dental procedures with an implant and that would need to be healed first before considering bone modifying agents such as bisphosphonates or rank ligand inhibitors.  I specifically discussed Prolia which could be given through the office.  Information provided including risks and side effects such as injection site reactions, allergic reactions, low electrolyte levels such as calcium, magnesium, phosphorus, rare instance of osteonecrosis of the jaw.  She will discuss with her dentist and primary care provider regarding other options such as oral medications-Fosamax Boniva etc.  I will see her back in 6 months to reassess.

## 2023-04-24 NOTE — PROGRESS NOTES
Chief Complaint  Breast Cancer    Sheldon, AnabelBRYANT Puckett    Subjective          Cindy Rosa presents to [facility] for follow-up of breast cancer.  She is on extended adjuvant anastrozole.  She is taking her medication daily as prescribed.  She denies any issues.  She denies any new masses or adenopathy.  No unusual aches or pains.  She does take calcium and vitamin D daily.  She is trying exercise.  She reports a recent tooth extraction and will be having a dental implant over the course of the next few months.    Oncology/Hematology History Overview Note   2016 Left breast with multifocal invasive lobular carcinoma intermediate grade  ER/MA+ HER2+ Ki 67 25%            Clinical Staging      Stage IIA (T1b N1aM0)            Treatments      Chemotherapy      12/28/16 thru 4/11/17 completed 6C [facility] chemo; 12/19/17 completed 1yr Herceptin   therapy;    5/23/17 Arimidex initiated;   3/1/18 Nerlynx 240mg prescribed      10/24/22 Breast Cancer Index did not recommend 10 years of AI. Patient wants to continue Arimidex.    Radiation Therapy      5/2/17 declined radiation therapy        Surgeries      11/16/2016 Left Mastectomy         Malignant neoplasm of left breast in female, estrogen receptor positive (HCC)   9/22/2021 Initial Diagnosis    Breast cancer (HCC)         Review of Systems   Constitutional: Negative for appetite change, diaphoresis, fatigue, fever, unexpected weight gain and unexpected weight loss.   HENT: Negative for hearing loss, mouth sores, sore throat, swollen glands, trouble swallowing and voice change.    Eyes: Negative for blurred vision.   Respiratory: Negative for cough, shortness of breath and wheezing.    Cardiovascular: Negative for chest pain and palpitations.   Gastrointestinal: Negative for abdominal pain, blood in stool, constipation, diarrhea, nausea and vomiting.   Endocrine: Negative for cold intolerance and heat intolerance.    Genitourinary: Negative for difficulty urinating, dysuria, frequency, hematuria and urinary incontinence.   Musculoskeletal: Negative for arthralgias, back pain and myalgias.   Skin: Negative for rash, skin lesions and wound.   Neurological: Negative for dizziness, seizures, weakness, numbness and headache.   Hematological: Does not bruise/bleed easily.   Psychiatric/Behavioral: Negative for depressed mood. The patient is not nervous/anxious.      Current Outpatient Medications on File Prior to Visit   Medication Sig Dispense Refill   • acetaminophen (TYLENOL) 500 MG tablet Take 1 tablet by mouth Every 6 (Six) Hours As Needed for Mild Pain.     • Alpha-Lipoic Acid 600 MG capsule Take 1,200 mg by mouth Every Night.     • amLODIPine (NORVASC) 5 MG tablet Take 1 tablet by mouth Daily.     • anastrozole (ARIMIDEX) 1 MG tablet TAKE ONE TABLET BY MOUTH DAILY 90 tablet 1   • aspirin 81 MG tablet Take 1 tablet by mouth Daily.     • Biotin w/ Vitamins C & E (HAIR/SKIN/NAILS PO) Take 1 tablet by mouth Every Night.     • calcium carbonate (OS-RJ) 1250 (500 Ca) MG tablet Calcium 500 500 mg calcium (1,250 mg) oral tablet take 1 tablet by oral route daily   Active     • calcium citrate-vitamin d (CITRACAL) 200-250 MG-UNIT tablet tablet Take 1 tablet by mouth Daily.     • cholecalciferol (VITAMIN D3) 1000 units tablet Take 1 tablet by mouth Daily.     • Cyanocobalamin (B-12) 2500 MCG sublingual tablet Place 1 tablet under the tongue Daily.     • hydrochlorothiazide (HYDRODIURIL) 12.5 MG tablet Take 1 tablet by mouth Daily.     • Loperamide HCl (IMODIUM PO) Take 4 tablets by mouth 2 (Two) Times a Day.     • Loratadine 10 MG capsule Take 1 capsule by mouth Daily.     • Melatonin 10 MG tablet Take 1 tablet by mouth Every Night.     • metoprolol succinate XL (TOPROL-XL) 50 MG 24 hr tablet 1.5 tablets.     • Multiple Vitamins-Minerals (MULTIVITAMIN ADULT PO) Take 1 tablet by mouth Daily.     • omeprazole (priLOSEC) 20 MG capsule  Take 1 capsule by mouth Daily.     • oxybutynin (DITROPAN) 5 MG tablet Take 1 tablet by mouth 3 (Three) Times a Day As Needed.     • rosuvastatin (CRESTOR) 10 MG tablet Take 1 tablet by mouth Daily.       No current facility-administered medications on file prior to visit.       No Known Allergies  Past Medical History:   Diagnosis Date   • Breast cancer 2016    Left mastectomy.   • Diarrhea    • Fibula fracture 3/209    LEFT   • History of breast cancer    • History of chemotherapy    • History of recent fall 2019   • Hyperlipidemia    • Hypertension    • Malignant neoplasm of left breast in female, estrogen receptor positive 2021   • Neuropathy    • Osteopenia of necks of both femurs 2023   • SOB (shortness of breath) on exertion    • Urinary frequency      Past Surgical History:   Procedure Laterality Date   • ANKLE OPEN REDUCTION INTERNAL FIXATION Left 2019    Procedure: LEFT PILON AND FIBULA FRACTURE OPEN REDUCTION INTERNAL FIXATION;  Surgeon: Skinny Dupree Jr., MD;  Location: [facility];  Service: Orthopedics   • BREAST BIOPSY Left    • COLONOSCOPY     • DILATION AND CURETTAGE, DIAGNOSTIC / THERAPEUTIC     • MASTECTOMY Left 2016   • WISDOM TOOTH EXTRACTION     • WRIST FRACTURE SURGERY Right      Social History     Socioeconomic History   • Marital status:    Tobacco Use   • Smoking status: Former     Packs/day: 0.50     Types: Cigarettes     Quit date:      Years since quittin.3   • Smokeless tobacco: Never   Vaping Use   • Vaping Use: Never used   Substance and Sexual Activity   • Alcohol use: Yes     Comment: OCC   • Drug use: No   • Sexual activity: Defer     Family History   Problem Relation Age of Onset   • Malig Hyperthermia Neg Hx        Objective   Physical Exam  Vitals reviewed. Exam conducted with a chaperone present.   Constitutional:       General: She is not in acute distress.     Appearance: Normal appearance.   Cardiovascular:      Rate and Rhythm:  Normal rate and regular rhythm.      Heart sounds: Normal heart sounds. No murmur heard.    No gallop.   Pulmonary:      Effort: Pulmonary effort is normal.      Breath sounds: Normal breath sounds.   Chest:   Breasts:     Right: Normal.      Left: Absent.       Abdominal:      General: Abdomen is flat. Bowel sounds are normal.      Palpations: Abdomen is soft.   Musculoskeletal:      Cervical back: Neck supple.      Right lower leg: No edema.      Left lower leg: No edema.   Lymphadenopathy:      Cervical: No cervical adenopathy.      Upper Body:      Right upper body: No supraclavicular or axillary adenopathy.      Left upper body: No supraclavicular or axillary adenopathy.   Neurological:      Mental Status: She is alert and oriented to person, place, and time.   Psychiatric:         Mood and Affect: Mood normal.         Behavior: Behavior normal.         Vitals:    04/24/23 1338   BP: 153/72   Pulse: 60   Resp: 18   Temp: 98.6 °F (37 °C)   TempSrc: Temporal   SpO2: 98%   Weight: 100 kg (220 lb 7.4 oz)  Comment: noted 16 lb weight gain since oct.   PainSc: 0-No pain               PHQ-9 Total Score:                    Result Review :   The following data was reviewed by: Payam Dent MD on 04/24/2023:  Lab Results   Component Value Date    HGB 11.4 (L) 04/08/2019    HCT 33.1 (L) 04/08/2019    MCV 86.6 04/08/2019     04/08/2019    WBC 8.34 04/08/2019     Lab Results   Component Value Date    GLUCOSE 95 04/08/2019    BUN 17 04/08/2019    CREATININE 1.13 (H) 04/08/2019     (L) 04/08/2019    K 3.5 04/08/2019    CL 93 (L) 04/08/2019    CO2 24.9 04/08/2019    CALCIUM 10.1 04/08/2019    PROTEINTOT 7.0 04/08/2019    ALBUMIN 3.90 04/08/2019    BILITOT 1.0 04/08/2019    ALKPHOS 85 04/08/2019    AST 19 04/08/2019    ALT 15 04/08/2019     No results found for: MG, PHOS, FREET4, TSH  No results found for: IRON, LABIRON, TRANSFERRIN, TIBC  No results found for: LDH, FERRITIN, DUHAVBIC86, FOLATE  No results  found for: PSA, CEA, AFP, ,   Breast cancer index shows no benefit to extended adjuvant therapy    Data reviewed: Radiologic studies Mammogram and DEXA scan reviewed      Assessment and Plan    Diagnoses and all orders for this visit:    1. Malignant neoplasm of left breast in female, estrogen receptor positive, unspecified site of breast (Primary)  Assessment & Plan:  Patient is on extended adjuvant therapy with anastrozole.  Breast cancer index did not demonstrate any clear benefit.  DEXA scan shows worsening osteopenia.  This is at least in part related to her anastrozole therapy.  In light of worsening bone density and no clear benefit to the anastrozole extended treatment, I would recommend stopping the anastrozole.  Patient is in agreement.  She will finish her current supply and then stop.  I did not detect any evidence of recurrence by history, physical examination or recent mammogram which was reviewed.  She will continue yearly mammogram on the right.  I will see her back in 6 months for ongoing surveillance.    Orders:  -     Mammo screening modified with tomosynthesis right w CAD; Future    2. Encounter for screening mammogram for malignant neoplasm of breast  -     Mammo screening modified with tomosynthesis right w CAD; Future    3. Osteopenia of necks of both femurs  Assessment & Plan:  DEXA scan reviewed showing worsening osteopenia, FRAX score 15%.  She does take calcium and vitamin D daily.  We discussed routine exercise including weightbearing.  We discussed treatment for osteopenia with FRAX score >10.  She is currently undergoing dental procedures with an implant and that would need to be healed first before considering bone modifying agents such as bisphosphonates or rank ligand inhibitors.  I specifically discussed Prolia which could be given through the office.  Information provided including risks and side effects such as injection site reactions, allergic reactions, low  electrolyte levels such as calcium, magnesium, phosphorus, rare instance of osteonecrosis of the jaw.  She will discuss with her dentist and primary care provider regarding other options such as oral medications-Fosamax Boniva etc.  I will see her back in 6 months to reassess.            Patient Follow Up: 6 months    Patient was given instructions and counseling regarding her condition or for health maintenance advice. Please see specific information pulled into the AVS if appropriate.     Payam Dent MD    4/24/2023

## 2023-04-24 NOTE — ASSESSMENT & PLAN NOTE
Patient is on extended adjuvant therapy with anastrozole.  Breast cancer index did not demonstrate any clear benefit.  DEXA scan shows worsening osteopenia.  This is at least in part related to her anastrozole therapy.  In light of worsening bone density and no clear benefit to the anastrozole extended treatment, I would recommend stopping the anastrozole.  Patient is in agreement.  She will finish her current supply and then stop.  I did not detect any evidence of recurrence by history, physical examination or recent mammogram which was reviewed.  She will continue yearly mammogram on the right.  I will see her back in 6 months for ongoing surveillance.

## 2023-09-25 ENCOUNTER — HOSPITAL ENCOUNTER (OUTPATIENT)
Dept: MAMMOGRAPHY | Facility: HOSPITAL | Age: 78
Discharge: HOME | End: 2023-09-25
Admitting: INTERNAL MEDICINE
Payer: MEDICARE

## 2023-09-25 DIAGNOSIS — Z12.31 ENCOUNTER FOR SCREENING MAMMOGRAM FOR MALIGNANT NEOPLASM OF BREAST: ICD-10-CM

## 2023-09-25 DIAGNOSIS — Z17.0 MALIGNANT NEOPLASM OF LEFT BREAST IN FEMALE, ESTROGEN RECEPTOR POSITIVE, UNSPECIFIED SITE OF BREAST: ICD-10-CM

## 2023-09-25 DIAGNOSIS — C50.912 MALIGNANT NEOPLASM OF LEFT BREAST IN FEMALE, ESTROGEN RECEPTOR POSITIVE, UNSPECIFIED SITE OF BREAST: ICD-10-CM

## 2023-09-25 PROCEDURE — 77067 SCR MAMMO BI INCL CAD: CPT

## 2023-09-25 PROCEDURE — 77063 BREAST TOMOSYNTHESIS BI: CPT

## 2023-09-28 ENCOUNTER — OFFICE VISIT (OUTPATIENT)
Dept: ONCOLOGY | Facility: HOSPITAL | Age: 78
End: 2023-09-28
Payer: MEDICARE

## 2023-09-28 VITALS
WEIGHT: 222.66 LBS | SYSTOLIC BLOOD PRESSURE: 150 MMHG | OXYGEN SATURATION: 97 % | HEART RATE: 64 BPM | TEMPERATURE: 98.5 F | DIASTOLIC BLOOD PRESSURE: 79 MMHG | BODY MASS INDEX: 35.96 KG/M2 | RESPIRATION RATE: 16 BRPM

## 2023-09-28 DIAGNOSIS — C50.912 MALIGNANT NEOPLASM OF LEFT BREAST IN FEMALE, ESTROGEN RECEPTOR POSITIVE, UNSPECIFIED SITE OF BREAST: Primary | ICD-10-CM

## 2023-09-28 DIAGNOSIS — Z17.0 MALIGNANT NEOPLASM OF LEFT BREAST IN FEMALE, ESTROGEN RECEPTOR POSITIVE, UNSPECIFIED SITE OF BREAST: Primary | ICD-10-CM

## 2023-09-28 PROCEDURE — G0463 HOSPITAL OUTPT CLINIC VISIT: HCPCS

## 2023-09-28 NOTE — PROGRESS NOTES
Chief Complaint  Breast Cancer    Anabel Woodson*  Anabel Woodson, APRN    Subjective          Cindy Rosa presents to [facility]EMATOLOGY [facility] ONCOLOGY for follow-up of breast cancer.  She status post treatments as outlined.  She is currently on surveillance.  She has completed adjuvant aromatase inhibitor therapy-BCI did not demonstrate benefit to extended adjuvant treatment.  She states she is feeling well.  No issues from her mastectomy site or remaining breast.  She had recent mammogram.  She is currently having dental work done.  She notes neuropathy in her feet which has been ongoing since her prior chemotherapy.  It bothers her mostly at night but does interfere with walking on occasion    Oncology/Hematology History Overview Note   2016 Left breast with multifocal invasive lobular carcinoma intermediate grade  ER/IN+ HER2+ Ki 67 25%            Clinical Staging      Stage IIA (T1b N1aM0)            Treatments      Chemotherapy      12/28/16 thru 4/11/17 completed 6C TCHP chemo; 12/19/17 completed 1yr Herceptin   therapy;    5/23/17 Arimidex initiated;   3/1/18 Nerlynx 240mg prescribed      10/24/22 Breast Cancer Index did not recommend 10 years of AI. Patient wants to continue Arimidex.    Radiation Therapy      5/2/17 declined radiation therapy        Surgeries      11/16/2016 Left Mastectomy         Malignant neoplasm of left breast in female, estrogen receptor positive   9/22/2021 Initial Diagnosis    Breast cancer (HCC)         Review of Systems   Constitutional:  Negative for appetite change, diaphoresis, fatigue, fever, unexpected weight gain and unexpected weight loss.   HENT:  Negative for hearing loss, mouth sores, sore throat, swollen glands, trouble swallowing and voice change.    Eyes:  Negative for blurred vision.   Respiratory:  Negative for cough, shortness of breath and wheezing.    Cardiovascular:  Negative for chest pain and palpitations.    Gastrointestinal:  Negative for abdominal pain, blood in stool, constipation, diarrhea, nausea and vomiting.   Endocrine: Negative for cold intolerance and heat intolerance.   Genitourinary:  Negative for difficulty urinating, dysuria, frequency, hematuria and urinary incontinence.   Musculoskeletal:  Negative for arthralgias, back pain and myalgias.   Skin:  Negative for rash, skin lesions and wound.   Neurological:  Negative for dizziness, seizures, weakness, numbness and headache.   Hematological:  Does not bruise/bleed easily.   Psychiatric/Behavioral:  Negative for depressed mood. The patient is not nervous/anxious.    Current Outpatient Medications on File Prior to Visit   Medication Sig Dispense Refill    acetaminophen (TYLENOL) 500 MG tablet Take 1 tablet by mouth Every 6 (Six) Hours As Needed for Mild Pain.      Alpha-Lipoic Acid 600 MG capsule Take 1,200 mg by mouth Every Night.      amLODIPine (NORVASC) 5 MG tablet Take 1 tablet by mouth Daily.      aspirin 81 MG tablet Take 1 tablet by mouth Daily.      Biotin w/ Vitamins C & E (HAIR/SKIN/NAILS PO) Take 1 tablet by mouth Every Night.      calcium carbonate (OS-RJ) 1250 (500 Ca) MG tablet Calcium 500 500 mg calcium (1,250 mg) oral tablet take 1 tablet by oral route daily   Active      calcium citrate-vitamin d (CITRACAL) 200-250 MG-UNIT tablet tablet Take 1 tablet by mouth Daily.      cholecalciferol (VITAMIN D3) 1000 units tablet Take 1 tablet by mouth Daily.      Cyanocobalamin (B-12) 2500 MCG sublingual tablet Place 1 tablet under the tongue Daily.      hydrochlorothiazide (HYDRODIURIL) 12.5 MG tablet Take 1 tablet by mouth Daily.      Loperamide HCl (IMODIUM PO) Take 4 tablets by mouth 2 (Two) Times a Day.      Loratadine 10 MG capsule Take 1 capsule by mouth Daily.      Melatonin 10 MG tablet Take 1 tablet by mouth Every Night.      metoprolol succinate XL (TOPROL-XL) 50 MG 24 hr tablet 1.5 tablets.      Multiple Vitamins-Minerals (MULTIVITAMIN  ADULT PO) Take 1 tablet by mouth Daily.      omeprazole (priLOSEC) 20 MG capsule Take 1 capsule by mouth Daily.      oxybutynin (DITROPAN) 5 MG tablet Take 1 tablet by mouth 3 (Three) Times a Day As Needed.      rosuvastatin (CRESTOR) 10 MG tablet Take 1 tablet by mouth Daily.       No current facility-administered medications on file prior to visit.       No Known Allergies  Past Medical History:   Diagnosis Date    Breast cancer 2016    Left mastectomy.    Diarrhea     Fibula fracture 3/209    LEFT    History of breast cancer 2016    History of chemotherapy 2016    History of recent fall 2019    Hyperlipidemia     Hypertension     Malignant neoplasm of left breast in female, estrogen receptor positive 2021    Neuropathy     Osteopenia of necks of both femurs 2023    SOB (shortness of breath) on exertion     Urinary frequency      Past Surgical History:   Procedure Laterality Date    ANKLE OPEN REDUCTION INTERNAL FIXATION Left 2019    Procedure: LEFT PILON AND FIBULA FRACTURE OPEN REDUCTION INTERNAL FIXATION;  Surgeon: Skinny Dupree Jr., MD;  Location: [facility];  Service: Orthopedics    BREAST BIOPSY Left     COLONOSCOPY      DILATION AND CURETTAGE, DIAGNOSTIC / THERAPEUTIC      MASTECTOMY Left 2016    WISDOM TOOTH EXTRACTION      WRIST FRACTURE SURGERY Right      Social History     Socioeconomic History    Marital status:    Tobacco Use    Smoking status: Former     Packs/day: 0.50     Types: Cigarettes     Quit date:      Years since quittin.7    Smokeless tobacco: Never   Vaping Use    Vaping Use: Never used   Substance and Sexual Activity    Alcohol use: Yes     Comment: OCC    Drug use: No    Sexual activity: Defer     Family History   Problem Relation Age of Onset    Malig Hyperthermia Neg Hx        Objective   Physical Exam  Vitals reviewed. Exam conducted with a chaperone present.   Constitutional:       General: She is not in acute distress.     Appearance: Normal  appearance.   Cardiovascular:      Rate and Rhythm: Normal rate and regular rhythm.      Heart sounds: Normal heart sounds. No murmur heard.    No gallop.   Pulmonary:      Effort: Pulmonary effort is normal.      Breath sounds: Normal breath sounds.   Chest:   Breasts:     Right: Normal.      Left: Absent. Skin change (See diagram) present.       Abdominal:      General: Abdomen is flat. Bowel sounds are normal. There is no distension.      Palpations: Abdomen is soft.      Tenderness: There is no abdominal tenderness. There is no guarding.   Musculoskeletal:      Right lower leg: No edema.      Left lower leg: No edema.   Lymphadenopathy:      Upper Body:      Right upper body: No supraclavicular or axillary adenopathy.      Left upper body: No supraclavicular or axillary adenopathy.   Neurological:      Mental Status: She is oriented to person, place, and time.   Psychiatric:         Mood and Affect: Mood normal.         Behavior: Behavior normal.       Vitals:    09/28/23 1259   BP: 150/79   Pulse: 64   Resp: 16   Temp: 98.5 °F (36.9 °C)   TempSrc: Temporal   SpO2: 97%   Weight: 101 kg (222 lb 10.6 oz)   PainSc: 0-No pain     ECOG score: 0         PHQ-9 Total Score:                    Result Review :   The following data was reviewed by: Payam Dent MD on 09/28/2023:  Lab Results   Component Value Date    HGB 11.4 (L) 04/08/2019    HCT 33.1 (L) 04/08/2019    MCV 86.6 04/08/2019     04/08/2019    WBC 8.34 04/08/2019     Lab Results   Component Value Date    GLUCOSE 95 04/08/2019    BUN 17 04/08/2019    CREATININE 1.13 (H) 04/08/2019     (L) 04/08/2019    K 3.5 04/08/2019    CL 93 (L) 04/08/2019    CO2 24.9 04/08/2019    CALCIUM 10.1 04/08/2019    PROTEINTOT 7.0 04/08/2019    ALBUMIN 3.90 04/08/2019    BILITOT 1.0 04/08/2019    ALKPHOS 85 04/08/2019    AST 19 04/08/2019    ALT 15 04/08/2019     No results found for: MG, PHOS, FREET4, TSH  No results found for: IRON, LABIRON, TRANSFERRIN,  TIBC  No results found for: LDH, FERRITIN, [id number], FOLATE  No results found for: PSA, CEA, AFP, ,     Data reviewed : Radiologic studies mammogram reviewed       Assessment and Plan    Diagnoses and all orders for this visit:    1. Malignant neoplasm of left breast in female, estrogen receptor positive, unspecified site of breast (Primary)  Assessment & Plan:  Patient is on surveillance.  She is doing well.  I see no evidence of disease recurrence by history, physical examination or recent mammogram.  She does have neuropathy in her feet from prior chemotherapy.  We discussed over-the-counter capsaicin cream at bedtime to help with those symptoms.  I will see her back in 6 months for continued surveillance.      Other orders  -     SCANNED - LABS            Patient Follow Up: 6 months    Patient was given instructions and counseling regarding her condition or for health maintenance advice. Please see specific information pulled into the AVS if appropriate.     Payam Dent MD    9/28/2023

## 2023-09-28 NOTE — ASSESSMENT & PLAN NOTE
Patient is on surveillance.  She is doing well.  I see no evidence of disease recurrence by history, physical examination or recent mammogram.  She does have neuropathy in her feet from prior chemotherapy.  We discussed over-the-counter capsaicin cream at bedtime to help with those symptoms.  I will see her back in 6 months for continued surveillance.

## 2023-12-04 ENCOUNTER — OFFICE VISIT (OUTPATIENT)
Dept: SLEEP MEDICINE | Facility: HOSPITAL | Age: 78
End: 2023-12-04
Payer: MEDICARE

## 2023-12-04 VITALS
SYSTOLIC BLOOD PRESSURE: 139 MMHG | HEART RATE: 65 BPM | DIASTOLIC BLOOD PRESSURE: 71 MMHG | HEIGHT: 66 IN | BODY MASS INDEX: 35.36 KG/M2 | WEIGHT: 220 LBS | OXYGEN SATURATION: 99 %

## 2023-12-04 DIAGNOSIS — G47.33 OSA ON CPAP: Primary | ICD-10-CM

## 2023-12-04 DIAGNOSIS — E66.9 CLASS 2 OBESITY: ICD-10-CM

## 2023-12-04 PROCEDURE — 99213 OFFICE O/P EST LOW 20 MIN: CPT | Performed by: INTERNAL MEDICINE

## 2023-12-04 PROCEDURE — 1160F RVW MEDS BY RX/DR IN RCRD: CPT | Performed by: INTERNAL MEDICINE

## 2023-12-04 PROCEDURE — G0463 HOSPITAL OUTPT CLINIC VISIT: HCPCS

## 2023-12-04 PROCEDURE — 3078F DIAST BP <80 MM HG: CPT | Performed by: INTERNAL MEDICINE

## 2023-12-04 PROCEDURE — 1159F MED LIST DOCD IN RCRD: CPT | Performed by: INTERNAL MEDICINE

## 2023-12-04 PROCEDURE — 3075F SYST BP GE 130 - 139MM HG: CPT | Performed by: INTERNAL MEDICINE

## 2023-12-04 RX ORDER — HYDROCHLOROTHIAZIDE 25 MG/1
25 TABLET ORAL
COMMUNITY
Start: 2023-10-04

## 2023-12-04 NOTE — PROGRESS NOTES
"  [facility]  [address]  [address]  [place] [address]  Phone   Fax       SLEEP CLINIC FOLLOW UP PROGRESS NOTE.    Cindy Rosa  [id number]   1945  78 y.o.  female      PCP: Anabel Woodson APRN      Date of visit: 12/4/2023    Chief Complaint   Patient presents with    Sleep Apnea    Obesity       HPI:  This is a 78 y.o. years old patient is here for the management of obstructive sleep apnea.  Sleep apnea is severe in severity with a AHI of 35/hr. Patient is using positive airway pressure therapy with auto CPAP and the symptoms of sleep apnea have improved significantly on the therapy. Normally patient goes to bed at 11 PM and wakes up at 730 AM .  The patient wakes up 0-1 time(s) during the night and has no problem going back to sleep.  Feels refreshed after waking up.     Medications and allergies are reviewed by me and documented in the encounter.     SOCIAL (habits pertaining to sleep medicine)  History tobacco use:No   History of alcohol use: 1 per week  Caffeine use: 4     REVIEW OF SYSTEMS:   Pertaining positive symptoms are:  [place] Sleepiness Scale :Total score: 4   Snoring resolved      PHYSICAL EXAMINATION:  CONSTITUTIONAL:  Vitals:    12/04/23 1300   BP: 139/71   Pulse: 65   SpO2: 99%   Weight: 99.8 kg (220 lb)   Height: 167.6 cm (65.98\")    Body mass index is 35.53 kg/m².   NOSE: nasal passages are clear, No deformities noted   RESP SYSTEM: Not in any respiratory distress, no chest deformities noted,   CARDIOVASULAR: No edema noted  NEURO: Oriented x 3, gait normal,  Mood and affect appeared appropriate      Data reviewed:  The Smart card downloaded on 12/4/2023 has been reviewed independently by me for compliance and discussed the data with the patient.   Compliance; 100%  More than 4 hr use, 100%  Average use of the device 7 hours and 21 minutes per night  Residual AHI: 2.4 /hr (goal < 5.0 /hr)  Mask type: Nasal pillows  Device: " DreamStation 2  DME: [employer]      ASSESSMENT AND PLAN:  Obstructive sleep apnea ( G 47.33).  The symptoms of sleep apnea have improved with the device and the treatment.  Patient's compliance with the device is excellent for treatment of sleep apnea.  I have independently reviewed the smart card down load and discussed with the patient the download data and encouarged the patient to continue to use the device.The residual AHI is acceptable. The device is benefiting the patient and the device is medically necessary.  Without proper control of sleep apnea and good compliance there is a increased risk for hypertension, diabetes mellitus and nonrestorative sleep with hypersomnia which can increase risk for motor vehicle accidents.  Untreated sleep apnea is also a risk factor for development of atrial fibrillation, pulmonary hypertension, insulin resistance and stroke. The patient is also instructed to get the supplies from the DME company and and change them on a regular basis.  A prescription for supplies has been sent to the DME company.  I have also discussed the good sleep hygiene habits and adequate amount of sleep needed for good health.  Obesity  2 with BMI is Body mass index is 35.53 kg/m².. I have discuss the relationship between the weight and sleep apnea. The benefit of weight loss in reducing severity of sleep apnea was discussed. Discussed diet and exercise with the patient to achieve ideal BMI.  Return in about 1 year (around 12/4/2024) for with smart card down load. . Patient's questions were answered.    12/4/2023  Nya Aguilera MD  Sleep Medicine.  Medical Director,   [facility] sleep centers.

## 2024-02-22 ENCOUNTER — CLINICAL SUPPORT (OUTPATIENT)
Dept: GASTROENTEROLOGY | Facility: CLINIC | Age: 79
End: 2024-02-22
Payer: MEDICARE

## 2024-02-22 NOTE — PROGRESS NOTES
During the Direct Access call with Cindy Rosa, 1945  the patient communicated that they would like to defer her colonoscopy at this time due to her age. Patient is 78 yrs old and she believes the risks outweigh the benefits and she is not having any concers. The patient verbalized understanding and will contact the office if she decides she wants to proceed with the colonoscopy or if she has any symptoms.

## 2024-04-15 ENCOUNTER — OFFICE VISIT (OUTPATIENT)
Dept: ONCOLOGY | Facility: HOSPITAL | Age: 79
End: 2024-04-15
Payer: MEDICARE

## 2024-04-15 VITALS
DIASTOLIC BLOOD PRESSURE: 56 MMHG | BODY MASS INDEX: 35.61 KG/M2 | RESPIRATION RATE: 16 BRPM | WEIGHT: 221.56 LBS | HEART RATE: 63 BPM | OXYGEN SATURATION: 97 % | SYSTOLIC BLOOD PRESSURE: 137 MMHG | TEMPERATURE: 97.3 F | HEIGHT: 66 IN

## 2024-04-15 DIAGNOSIS — Z17.0 MALIGNANT NEOPLASM OF LEFT BREAST IN FEMALE, ESTROGEN RECEPTOR POSITIVE, UNSPECIFIED SITE OF BREAST: Primary | ICD-10-CM

## 2024-04-15 DIAGNOSIS — Z12.31 ENCOUNTER FOR SCREENING MAMMOGRAM FOR BREAST CANCER: ICD-10-CM

## 2024-04-15 DIAGNOSIS — M85.852 OSTEOPENIA OF NECKS OF BOTH FEMURS: ICD-10-CM

## 2024-04-15 DIAGNOSIS — C50.912 MALIGNANT NEOPLASM OF LEFT BREAST IN FEMALE, ESTROGEN RECEPTOR POSITIVE, UNSPECIFIED SITE OF BREAST: Primary | ICD-10-CM

## 2024-04-15 DIAGNOSIS — M85.851 OSTEOPENIA OF NECKS OF BOTH FEMURS: ICD-10-CM

## 2024-04-15 PROCEDURE — 99214 OFFICE O/P EST MOD 30 MIN: CPT | Performed by: INTERNAL MEDICINE

## 2024-04-15 PROCEDURE — 1126F AMNT PAIN NOTED NONE PRSNT: CPT | Performed by: INTERNAL MEDICINE

## 2024-04-15 PROCEDURE — G0463 HOSPITAL OUTPT CLINIC VISIT: HCPCS | Performed by: INTERNAL MEDICINE

## 2024-04-15 PROCEDURE — G2211 COMPLEX E/M VISIT ADD ON: HCPCS | Performed by: INTERNAL MEDICINE

## 2024-04-15 PROCEDURE — 3075F SYST BP GE 130 - 139MM HG: CPT | Performed by: INTERNAL MEDICINE

## 2024-04-15 PROCEDURE — 3078F DIAST BP <80 MM HG: CPT | Performed by: INTERNAL MEDICINE

## 2024-04-15 NOTE — ASSESSMENT & PLAN NOTE
Patient is on extended adjuvant anastrozole started 5/17.  Tolerating well.  I see no evidence of disease recurrence per history, physical examination or recent mammogram.  Continue anastrozole.  I will see her back in 6 months for continued treatment with mammogram prior

## 2024-04-15 NOTE — PROGRESS NOTES
Chief Complaint  Malignant neoplasm of left breast in female, estrogen recep    Chintan, Anabel Beltran*  Chintan, Anabel Rock, APRN    Subjective          Cindy Rosa presents to [facility] HEMATOLOGY [facility] ONCOLOGY for follow-up of breast cancer.  She is on adjuvant anastrozole.  Tolerating well.  She denies any issues or side effects.  No new masses or adenopathy, no unusual aches or pains.  She notes adequate appetite and energy level.    Oncology/Hematology History Overview Note   2016 Left breast with multifocal invasive lobular carcinoma intermediate grade  ER/LA+ HER2+ Ki 67 25%            Clinical Staging      Stage IIA (T1b N1aM0)            Treatments      Chemotherapy      12/28/16 thru 4/11/17 completed 6C TCHP chemo; 12/19/17 completed 1yr Herceptin   therapy;    5/23/17 Arimidex initiated;   3/1/18 Nerlynx 240mg prescribed      10/24/22 Breast Cancer Index did not recommend 10 years of AI. Patient wants to continue Arimidex.    Radiation Therapy      5/2/17 declined radiation therapy        Surgeries      11/16/2016 Left Mastectomy         Malignant neoplasm of left breast in female, estrogen receptor positive   9/22/2021 Initial Diagnosis    Breast cancer (HCC)         Review of Systems   Constitutional:  Negative for appetite change, diaphoresis, fatigue, fever, unexpected weight gain and unexpected weight loss.   HENT:  Negative for hearing loss, mouth sores, sore throat, swollen glands, trouble swallowing and voice change.    Eyes:  Negative for blurred vision.   Respiratory:  Negative for cough, shortness of breath and wheezing.    Cardiovascular:  Negative for chest pain and palpitations.   Gastrointestinal:  Negative for abdominal pain, blood in stool, constipation, diarrhea, nausea and vomiting.   Endocrine: Negative for cold intolerance and heat intolerance.   Genitourinary:  Negative for difficulty urinating, dysuria, frequency, hematuria and urinary incontinence.    Musculoskeletal:  Negative for arthralgias, back pain and myalgias.   Skin:  Negative for rash, skin lesions and wound.   Neurological:  Positive for numbness (KRISTI foot and some KRISTI hand). Negative for dizziness, seizures, weakness and headache.   Hematological:  Does not bruise/bleed easily.   Psychiatric/Behavioral:  Negative for depressed mood. The patient is not nervous/anxious.      Current Outpatient Medications on File Prior to Visit   Medication Sig Dispense Refill    acetaminophen (TYLENOL) 500 MG tablet Take 1 tablet by mouth Every 6 (Six) Hours As Needed for Mild Pain.      Alpha-Lipoic Acid 600 MG capsule Take 1,200 mg by mouth Every Night.      amLODIPine (NORVASC) 5 MG tablet Take 1 tablet by mouth Daily.      aspirin 81 MG tablet Take 1 tablet by mouth Daily.      Biotin w/ Vitamins C & E (HAIR/SKIN/NAILS PO) Take 1 tablet by mouth Every Night.      calcium carbonate (OS-RJ) 1250 (500 Ca) MG tablet       calcium citrate-vitamin d (CITRACAL) 200-250 MG-UNIT tablet tablet Take 1 tablet by mouth Daily.      cholecalciferol (VITAMIN D3) 1000 units tablet Take 1 tablet by mouth Daily.      Cyanocobalamin (B-12) 2500 MCG sublingual tablet Place 1 tablet under the tongue Daily.      hydroCHLOROthiazide (HYDRODIURIL) 25 MG tablet 1 tablet.      Loperamide HCl (IMODIUM PO) Take 4 tablets by mouth 2 (Two) Times a Day.      Loratadine 10 MG capsule Take 1 capsule by mouth Daily.      Melatonin 10 MG tablet Take 1 tablet by mouth Every Night.      metoprolol succinate XL (TOPROL-XL) 50 MG 24 hr tablet 1.5 tablets.      Multiple Vitamins-Minerals (ICAPS AREDS 2 PO) Take  by mouth.      Multiple Vitamins-Minerals (MULTIVITAMIN ADULT PO) Take 1 tablet by mouth Daily.      omeprazole (priLOSEC) 20 MG capsule Take 1 capsule by mouth Daily.      oxybutynin (DITROPAN) 5 MG tablet Take 1 tablet by mouth 3 (Three) Times a Day As Needed.      rosuvastatin (CRESTOR) 10 MG tablet Take 1 tablet by mouth Daily.       No  current facility-administered medications on file prior to visit.       No Known Allergies  Past Medical History:   Diagnosis Date    Breast cancer 2016    Left mastectomy.    Diarrhea     Fibula fracture 3/209    LEFT    History of breast cancer 2016    History of chemotherapy 2016    History of recent fall 2019    Hyperlipidemia     Hypertension     Malignant neoplasm of left breast in female, estrogen receptor positive 2021    Neuropathy     Osteopenia of necks of both femurs 2023    SOB (shortness of breath) on exertion     Urinary frequency      Past Surgical History:   Procedure Laterality Date    ANKLE OPEN REDUCTION INTERNAL FIXATION Left 2019    Procedure: LEFT PILON AND FIBULA FRACTURE OPEN REDUCTION INTERNAL FIXATION;  Surgeon: Skinny Dupree Jr., MD;  Location: [facility];  Service: Orthopedics    BREAST BIOPSY Left     COLONOSCOPY      DILATION AND CURETTAGE, DIAGNOSTIC / THERAPEUTIC      MASTECTOMY Left 2016    WISDOM TOOTH EXTRACTION      WRIST FRACTURE SURGERY Right      Social History     Socioeconomic History    Marital status:    Tobacco Use    Smoking status: Former     Current packs/day: 0.00     Types: Cigarettes     Quit date:      Years since quittin.3    Smokeless tobacco: Never   Vaping Use    Vaping status: Never Used   Substance and Sexual Activity    Alcohol use: Yes     Comment: OCC    Drug use: No    Sexual activity: Defer     Family History   Problem Relation Age of Onset    Malig Hyperthermia Neg Hx        Objective   Physical Exam  Vitals reviewed. Exam conducted with a chaperone present.   Constitutional:       General: She is not in acute distress.     Appearance: Normal appearance.   Cardiovascular:      Rate and Rhythm: Normal rate and regular rhythm.      Heart sounds: Normal heart sounds. No murmur heard.     No gallop.   Pulmonary:      Effort: Pulmonary effort is normal.      Breath sounds: Normal breath sounds.   Chest:   Breasts:      "Right: Normal.      Left: Absent.       Abdominal:      General: Abdomen is flat. Bowel sounds are normal.      Palpations: Abdomen is soft.   Lymphadenopathy:      Upper Body:      Right upper body: No supraclavicular or axillary adenopathy.      Left upper body: No supraclavicular or axillary adenopathy.   Neurological:      Mental Status: She is alert.   Psychiatric:         Mood and Affect: Mood normal.         Behavior: Behavior normal.         Vitals:    04/15/24 1115   BP: 137/56   Pulse: 63   Resp: 16   Temp: 97.3 °F (36.3 °C)   SpO2: 97%   Weight: 101 kg (221 lb 9 oz)   Height: 167.6 cm (65.98\")   PainSc: 0-No pain     ECOG score: 0         PHQ-9 Total Score:                    Result Review :   The following data was reviewed by: Payam Dent MD on 04/15/2024:  Lab Results   Component Value Date    HGB 11.4 (L) 04/08/2019    HCT 33.1 (L) 04/08/2019    MCV 86.6 04/08/2019     04/08/2019    WBC 8.34 04/08/2019     Lab Results   Component Value Date    GLUCOSE 95 04/08/2019    BUN 17 04/08/2019    CREATININE 1.13 (H) 04/08/2019     (L) 04/08/2019    K 3.5 04/08/2019    CL 93 (L) 04/08/2019    CO2 24.9 04/08/2019    CALCIUM 10.1 04/08/2019    PROTEINTOT 7.0 04/08/2019    ALBUMIN 3.90 04/08/2019    BILITOT 1.0 04/08/2019    ALKPHOS 85 04/08/2019    AST 19 04/08/2019    ALT 15 04/08/2019     No results found for: \"MG\", \"PHOS\", \"FREET4\", \"TSH\"  No results found for: \"IRON\", \"LABIRON\", \"TRANSFERRIN\", \"TIBC\"  No results found for: \"LDH\", \"FERRITIN\", \"[id number]\", \"FOLATE\"  No results found for: \"PSA\", \"CEA\", \"AFP\", \"\", \"\"          Assessment and Plan    Diagnoses and all orders for this visit:    1. Malignant neoplasm of left breast in female, estrogen receptor positive, unspecified site of breast [C50.912, Z17.0] (Primary)  Assessment & Plan:  Patient is on extended adjuvant anastrozole started 5/17.  Tolerating well.  I see no evidence of disease recurrence per history, physical " examination or recent mammogram.  Continue anastrozole.  I will see her back in 6 months for continued treatment with mammogram prior      2. Encounter for screening mammogram for breast cancer  -     Mammo Screening Modified With Tomosynthesis Right With CAD; Future    3. Osteopenia of necks of both femurs  Assessment & Plan:  Patient exercises routinely.  She takes calcium supplement.  DEXA scan before next visit.    Orders:  -     DEXA Bone Density Axial; Future            Patient Follow Up: 6 months    Patient was given instructions and counseling regarding her condition or for health maintenance advice. Please see specific information pulled into the AVS if appropriate.     Payam Dent MD    4/15/2024

## 2024-06-18 ENCOUNTER — PROCEDURE VISIT (OUTPATIENT)
Dept: NEUROLOGY | Facility: CLINIC | Age: 79
End: 2024-06-18
Payer: MEDICARE

## 2024-06-18 ENCOUNTER — LAB (OUTPATIENT)
Dept: LAB | Facility: HOSPITAL | Age: 79
End: 2024-06-18
Payer: MEDICARE

## 2024-06-18 VITALS — HEIGHT: 66 IN | WEIGHT: 217 LBS | BODY MASS INDEX: 34.87 KG/M2

## 2024-06-18 DIAGNOSIS — E08.42 DIABETIC POLYNEUROPATHY ASSOCIATED WITH DIABETES MELLITUS DUE TO UNDERLYING CONDITION: ICD-10-CM

## 2024-06-18 DIAGNOSIS — E08.42 DIABETIC POLYNEUROPATHY ASSOCIATED WITH DIABETES MELLITUS DUE TO UNDERLYING CONDITION: Primary | ICD-10-CM

## 2024-06-18 LAB — HBA1C MFR BLD: 5.7 % (ref 4.8–5.6)

## 2024-06-18 PROCEDURE — 86255 FLUORESCENT ANTIBODY SCREEN: CPT

## 2024-06-18 PROCEDURE — 99203 OFFICE O/P NEW LOW 30 MIN: CPT | Performed by: PSYCHIATRY & NEUROLOGY

## 2024-06-18 PROCEDURE — 84446 ASSAY OF VITAMIN E: CPT

## 2024-06-18 PROCEDURE — 83036 HEMOGLOBIN GLYCOSYLATED A1C: CPT

## 2024-06-18 PROCEDURE — 84207 ASSAY OF VITAMIN B-6: CPT

## 2024-06-18 PROCEDURE — 84425 ASSAY OF VITAMIN B-1: CPT

## 2024-06-18 PROCEDURE — 36415 COLL VENOUS BLD VENIPUNCTURE: CPT

## 2024-06-18 PROCEDURE — 95910 NRV CNDJ TEST 7-8 STUDIES: CPT | Performed by: PSYCHIATRY & NEUROLOGY

## 2024-06-18 PROCEDURE — 95885 MUSC TST DONE W/NERV TST LIM: CPT | Performed by: PSYCHIATRY & NEUROLOGY

## 2024-06-18 RX ORDER — DULOXETIN HYDROCHLORIDE 20 MG/1
20 CAPSULE, DELAYED RELEASE ORAL DAILY
COMMUNITY
Start: 2024-06-05 | End: 2024-06-18

## 2024-06-18 RX ORDER — DULOXETIN HYDROCHLORIDE 30 MG/1
30 CAPSULE, DELAYED RELEASE ORAL NIGHTLY
COMMUNITY

## 2024-06-18 NOTE — PROGRESS NOTES
"Chief Complaint  NERVE STUDY (BLE) and Numbness (BLE, R>L)    Subjective          Cindy Rosa is a 78 y.o. female who presents to [facility]  History of Present Illness  78-year-old woman evaluated for neuropathy.  Etiology is undetermined.  She has had prediabetes.  She states symptoms of the numbness happened worse in 2019 and has been getting worse since that time.  It may have started happening after she had chemotherapy 2 years before that.  Objective   Vital Signs:   Ht 167.6 cm (65.98\")   Wt 98.4 kg (217 lb)   BMI 35.04 kg/m²     Physical Exam   She is alert, fluent, phasic, follows commands well.  There is no weakness of the lower extremity on individual muscle testing.  Sensation is decreased to pinprick and cold sensation to the mid leg.  Vibration is impaired.  Reflexes are absent in the ankles.        Assessment and Plan  Diagnoses and all orders for this visit:    1. Diabetic polyneuropathy associated with diabetes mellitus due to underlying condition (Primary)  Assessment & Plan:  Nerve conduction study is abnormal and shows electrophysiologic evidence for severe axonal sensorimotor polyneuropathy.  Etiology is most likely diabetic polyneuropathy.  A neuropathy workup will be initiated looking for other etiologies.  I will see her again in 1 months time for follow-up.    Orders:  -     Paraneoplastic Profile 1; Future  -     Hemoglobin A1c; Future  -     Vitamin B1, Whole Blood; Future  -     Vitamin E; Future  -     Vitamin B6 (Pyridoxine); Future         Nerve Conduction Study:  7 nerves     EMG:  Limited    Total time spent with the patient and coordinating patient care was 35 minutes.    Follow Up  No follow-ups on file.  Patient was given instructions and counseling regarding her condition or for health maintenance advice. Please see specific information pulled into the AVS if appropriate.       "

## 2024-06-18 NOTE — ASSESSMENT & PLAN NOTE
Nerve conduction study is abnormal and shows electrophysiologic evidence for severe axonal sensorimotor polyneuropathy.  Etiology is most likely diabetic polyneuropathy.  A neuropathy workup will be initiated looking for other etiologies.  I will see her again in 1 months time for follow-up.

## 2024-06-21 LAB
HU AB SER QL IF: NEGATIVE
HU2 AB SER QL IF: NEGATIVE
PCA-1 AB SER QL IF: NEGATIVE

## 2024-06-22 LAB — PYRIDOXAL PHOS SERPL-MCNC: 43.2 UG/L (ref 3.4–65.2)

## 2024-06-23 LAB
A-TOCOPHEROL VIT E SERPL-MCNC: 15.4 MG/L (ref 9–29)
GAMMA TOCOPHEROL SERPL-MCNC: 1.1 MG/L (ref 0.5–4.9)
VIT B1 BLD-SCNC: 132.8 NMOL/L (ref 66.5–200)

## 2024-10-01 ENCOUNTER — HOSPITAL ENCOUNTER (OUTPATIENT)
Dept: MAMMOGRAPHY | Facility: HOSPITAL | Age: 79
Discharge: HOME | End: 2024-10-01
Payer: MEDICARE

## 2024-10-01 ENCOUNTER — HOSPITAL ENCOUNTER (OUTPATIENT)
Dept: BONE DENSITY | Facility: HOSPITAL | Age: 79
Discharge: HOME | End: 2024-10-01
Payer: MEDICARE

## 2024-10-01 DIAGNOSIS — Z12.31 ENCOUNTER FOR SCREENING MAMMOGRAM FOR BREAST CANCER: ICD-10-CM

## 2024-10-01 DIAGNOSIS — M85.852 OSTEOPENIA OF NECKS OF BOTH FEMURS: ICD-10-CM

## 2024-10-01 DIAGNOSIS — M85.851 OSTEOPENIA OF NECKS OF BOTH FEMURS: ICD-10-CM

## 2024-10-01 PROCEDURE — 77080 DXA BONE DENSITY AXIAL: CPT

## 2024-10-01 PROCEDURE — 77067 SCR MAMMO BI INCL CAD: CPT

## 2024-10-01 PROCEDURE — 77063 BREAST TOMOSYNTHESIS BI: CPT

## 2024-10-02 ENCOUNTER — TRANSCRIBE ORDERS (OUTPATIENT)
Dept: ADMINISTRATIVE | Facility: HOSPITAL | Age: 79
End: 2024-10-02
Payer: MEDICARE

## 2024-10-02 DIAGNOSIS — R41.3 MEMORY LOSS: Primary | ICD-10-CM

## 2024-10-07 ENCOUNTER — OFFICE VISIT (OUTPATIENT)
Dept: ONCOLOGY | Facility: HOSPITAL | Age: 79
End: 2024-10-07
Payer: MEDICARE

## 2024-10-07 VITALS
DIASTOLIC BLOOD PRESSURE: 60 MMHG | BODY MASS INDEX: 35.45 KG/M2 | HEIGHT: 66 IN | RESPIRATION RATE: 18 BRPM | TEMPERATURE: 98.1 F | WEIGHT: 220.6 LBS | HEART RATE: 71 BPM | SYSTOLIC BLOOD PRESSURE: 141 MMHG | OXYGEN SATURATION: 95 %

## 2024-10-07 DIAGNOSIS — Z17.0 MALIGNANT NEOPLASM OF LEFT BREAST IN FEMALE, ESTROGEN RECEPTOR POSITIVE, UNSPECIFIED SITE OF BREAST: Primary | ICD-10-CM

## 2024-10-07 DIAGNOSIS — C50.912 MALIGNANT NEOPLASM OF LEFT BREAST IN FEMALE, ESTROGEN RECEPTOR POSITIVE, UNSPECIFIED SITE OF BREAST: Primary | ICD-10-CM

## 2024-10-07 DIAGNOSIS — Z12.31 ENCOUNTER FOR SCREENING MAMMOGRAM FOR BREAST CANCER: ICD-10-CM

## 2024-10-07 PROCEDURE — 1126F AMNT PAIN NOTED NONE PRSNT: CPT | Performed by: INTERNAL MEDICINE

## 2024-10-07 PROCEDURE — G0463 HOSPITAL OUTPT CLINIC VISIT: HCPCS | Performed by: INTERNAL MEDICINE

## 2024-10-07 PROCEDURE — 1160F RVW MEDS BY RX/DR IN RCRD: CPT | Performed by: INTERNAL MEDICINE

## 2024-10-07 PROCEDURE — 1159F MED LIST DOCD IN RCRD: CPT | Performed by: INTERNAL MEDICINE

## 2024-10-07 PROCEDURE — 3077F SYST BP >= 140 MM HG: CPT | Performed by: INTERNAL MEDICINE

## 2024-10-07 PROCEDURE — 3078F DIAST BP <80 MM HG: CPT | Performed by: INTERNAL MEDICINE

## 2024-10-07 PROCEDURE — 99212 OFFICE O/P EST SF 10 MIN: CPT | Performed by: INTERNAL MEDICINE

## 2024-10-07 NOTE — ASSESSMENT & PLAN NOTE
Patient has completed adjuvant anastrozole.  She is doing well.  I see no evidence of disease recurrence by history or physical examination.  Right mammogram is benign.  I will see her back on a yearly basis for continued surveillance with right mammogram prior.
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
## 2024-11-05 ENCOUNTER — HOSPITAL ENCOUNTER (OUTPATIENT)
Dept: MRI IMAGING | Facility: HOSPITAL | Age: 79
Discharge: HOME | End: 2024-11-05
Admitting: NURSE PRACTITIONER
Payer: MEDICARE

## 2024-11-05 DIAGNOSIS — R41.3 MEMORY LOSS: ICD-10-CM

## 2024-11-05 LAB
CREAT BLDA-MCNC: 1.1 MG/DL (ref 0.6–1.3)
EGFRCR SERPLBLD CKD-EPI 2021: 51.9 ML/MIN/1.73

## 2024-11-05 PROCEDURE — 70553 MRI BRAIN STEM W/O & W/DYE: CPT

## 2024-11-05 PROCEDURE — 82565 ASSAY OF CREATININE: CPT

## 2024-11-05 PROCEDURE — A9577 INJ MULTIHANCE: HCPCS | Performed by: NURSE PRACTITIONER

## 2024-11-05 PROCEDURE — 0 GADOBENATE DIMEGLUMINE 529 MG/ML SOLUTION: Performed by: NURSE PRACTITIONER

## 2024-11-05 RX ADMIN — GADOBENATE DIMEGLUMINE 20 ML: 529 INJECTION, SOLUTION INTRAVENOUS at 09:23

## 2025-02-05 ENCOUNTER — OFFICE VISIT (OUTPATIENT)
Dept: SLEEP MEDICINE | Facility: HOSPITAL | Age: 80
End: 2025-02-05
Payer: MEDICARE

## 2025-02-05 VITALS
HEIGHT: 66 IN | OXYGEN SATURATION: 93 % | SYSTOLIC BLOOD PRESSURE: 142 MMHG | DIASTOLIC BLOOD PRESSURE: 65 MMHG | HEART RATE: 70 BPM | BODY MASS INDEX: 37.11 KG/M2 | WEIGHT: 230.9 LBS

## 2025-02-05 DIAGNOSIS — I10 PRIMARY HYPERTENSION: ICD-10-CM

## 2025-02-05 DIAGNOSIS — E66.812 CLASS 2 OBESITY: ICD-10-CM

## 2025-02-05 DIAGNOSIS — G47.33 OSA ON CPAP: Primary | ICD-10-CM

## 2025-02-05 PROCEDURE — 1159F MED LIST DOCD IN RCRD: CPT | Performed by: INTERNAL MEDICINE

## 2025-02-05 PROCEDURE — G0463 HOSPITAL OUTPT CLINIC VISIT: HCPCS

## 2025-02-05 PROCEDURE — 3078F DIAST BP <80 MM HG: CPT | Performed by: INTERNAL MEDICINE

## 2025-02-05 PROCEDURE — 99214 OFFICE O/P EST MOD 30 MIN: CPT | Performed by: INTERNAL MEDICINE

## 2025-02-05 PROCEDURE — 1160F RVW MEDS BY RX/DR IN RCRD: CPT | Performed by: INTERNAL MEDICINE

## 2025-02-05 PROCEDURE — 3077F SYST BP >= 140 MM HG: CPT | Performed by: INTERNAL MEDICINE
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
## 2025-04-09 ENCOUNTER — APPOINTMENT (OUTPATIENT)
Dept: GENERAL RADIOLOGY | Facility: HOSPITAL | Age: 80
DRG: 522 | End: 2025-04-09
Payer: MEDICARE

## 2025-04-09 ENCOUNTER — ANESTHESIA EVENT (OUTPATIENT)
Dept: PERIOP | Facility: HOSPITAL | Age: 80
End: 2025-04-09
Payer: MEDICARE

## 2025-04-09 ENCOUNTER — HOSPITAL ENCOUNTER (INPATIENT)
Facility: HOSPITAL | Age: 80
LOS: 3 days | Discharge: NURSING, REHAB OR LONG-TERM CARE | DRG: 522 | End: 2025-04-12
Attending: EMERGENCY MEDICINE | Admitting: INTERNAL MEDICINE
Payer: MEDICARE

## 2025-04-09 DIAGNOSIS — Z78.9 DECREASED ACTIVITIES OF DAILY LIVING (ADL): ICD-10-CM

## 2025-04-09 DIAGNOSIS — S72.002A LEFT DISPLACED FEMORAL NECK FRACTURE: Primary | ICD-10-CM

## 2025-04-09 DIAGNOSIS — R26.2 DIFFICULTY IN WALKING: ICD-10-CM

## 2025-04-09 PROBLEM — S72.009A HIP FX: Status: ACTIVE | Noted: 2025-04-09

## 2025-04-09 LAB
ALBUMIN SERPL-MCNC: 3.8 G/DL (ref 3.5–5.2)
ALBUMIN/GLOB SERPL: 1.2 G/DL
ALP SERPL-CCNC: 115 U/L (ref 39–117)
ALT SERPL W P-5'-P-CCNC: 19 U/L (ref 1–33)
ANION GAP SERPL CALCULATED.3IONS-SCNC: 10.9 MMOL/L (ref 5–15)
AST SERPL-CCNC: 27 U/L (ref 1–32)
BASOPHILS # BLD AUTO: 0.04 10*3/MM3 (ref 0–0.2)
BASOPHILS NFR BLD AUTO: 0.3 % (ref 0–1.5)
BILIRUB SERPL-MCNC: 0.8 MG/DL (ref 0–1.2)
BILIRUB UR QL STRIP: NEGATIVE
BUN SERPL-MCNC: 13 MG/DL (ref 8–23)
BUN/CREAT SERPL: 13.1 (ref 7–25)
CALCIUM SPEC-SCNC: 9.4 MG/DL (ref 8.6–10.5)
CHLORIDE SERPL-SCNC: 94 MMOL/L (ref 98–107)
CLARITY UR: CLEAR
CO2 SERPL-SCNC: 27.1 MMOL/L (ref 22–29)
COLOR UR: YELLOW
CREAT SERPL-MCNC: 0.99 MG/DL (ref 0.57–1)
DEPRECATED RDW RBC AUTO: 40.4 FL (ref 37–54)
EGFRCR SERPLBLD CKD-EPI 2021: 58.8 ML/MIN/1.73
EOSINOPHIL # BLD AUTO: 0.2 10*3/MM3 (ref 0–0.4)
EOSINOPHIL NFR BLD AUTO: 1.6 % (ref 0.3–6.2)
ERYTHROCYTE [DISTWIDTH] IN BLOOD BY AUTOMATED COUNT: 13 % (ref 12.3–15.4)
GLOBULIN UR ELPH-MCNC: 3.1 GM/DL
GLUCOSE SERPL-MCNC: 139 MG/DL (ref 65–99)
GLUCOSE UR STRIP-MCNC: NEGATIVE MG/DL
HCT VFR BLD AUTO: 37.5 % (ref 34–46.6)
HGB BLD-MCNC: 13 G/DL (ref 12–15.9)
HGB UR QL STRIP.AUTO: NEGATIVE
HOLD SPECIMEN: NORMAL
IMM GRANULOCYTES # BLD AUTO: 0.1 10*3/MM3 (ref 0–0.05)
IMM GRANULOCYTES NFR BLD AUTO: 0.8 % (ref 0–0.5)
INR PPP: 0.94 (ref 0.86–1.15)
KETONES UR QL STRIP: NEGATIVE
LEUKOCYTE ESTERASE UR QL STRIP.AUTO: NEGATIVE
LYMPHOCYTES # BLD AUTO: 1.75 10*3/MM3 (ref 0.7–3.1)
LYMPHOCYTES NFR BLD AUTO: 13.7 % (ref 19.6–45.3)
MCH RBC QN AUTO: 29.6 PG (ref 26.6–33)
MCHC RBC AUTO-ENTMCNC: 34.7 G/DL (ref 31.5–35.7)
MCV RBC AUTO: 85.4 FL (ref 79–97)
MONOCYTES # BLD AUTO: 0.58 10*3/MM3 (ref 0.1–0.9)
MONOCYTES NFR BLD AUTO: 4.5 % (ref 5–12)
NEUTROPHILS NFR BLD AUTO: 10.1 10*3/MM3 (ref 1.7–7)
NEUTROPHILS NFR BLD AUTO: 79.1 % (ref 42.7–76)
NITRITE UR QL STRIP: NEGATIVE
NRBC BLD AUTO-RTO: 0 /100 WBC (ref 0–0.2)
PH UR STRIP.AUTO: 6 [PH] (ref 5–8)
PLATELET # BLD AUTO: 266 10*3/MM3 (ref 140–450)
PMV BLD AUTO: 8.8 FL (ref 6–12)
POTASSIUM SERPL-SCNC: 3.1 MMOL/L (ref 3.5–5.2)
PROT SERPL-MCNC: 6.9 G/DL (ref 6–8.5)
PROT UR QL STRIP: NEGATIVE
PROTHROMBIN TIME: 12.9 SECONDS (ref 11.8–14.9)
QT INTERVAL: 406 MS
QTC INTERVAL: 462 MS
RBC # BLD AUTO: 4.39 10*6/MM3 (ref 3.77–5.28)
SODIUM SERPL-SCNC: 132 MMOL/L (ref 136–145)
SP GR UR STRIP: 1.01 (ref 1–1.03)
UROBILINOGEN UR QL STRIP: NORMAL
WBC NRBC COR # BLD AUTO: 12.77 10*3/MM3 (ref 3.4–10.8)

## 2025-04-09 PROCEDURE — 25010000002 ACETAMINOPHEN 10 MG/ML SOLUTION: Performed by: EMERGENCY MEDICINE

## 2025-04-09 PROCEDURE — 51702 INSERT TEMP BLADDER CATH: CPT

## 2025-04-09 PROCEDURE — 81003 URINALYSIS AUTO W/O SCOPE: CPT | Performed by: EMERGENCY MEDICINE

## 2025-04-09 PROCEDURE — 99285 EMERGENCY DEPT VISIT HI MDM: CPT

## 2025-04-09 PROCEDURE — 87086 URINE CULTURE/COLONY COUNT: CPT | Performed by: EMERGENCY MEDICINE

## 2025-04-09 PROCEDURE — 94799 UNLISTED PULMONARY SVC/PX: CPT

## 2025-04-09 PROCEDURE — 93005 ELECTROCARDIOGRAM TRACING: CPT | Performed by: EMERGENCY MEDICINE

## 2025-04-09 PROCEDURE — 94660 CPAP INITIATION&MGMT: CPT

## 2025-04-09 PROCEDURE — 85025 COMPLETE CBC W/AUTO DIFF WBC: CPT | Performed by: EMERGENCY MEDICINE

## 2025-04-09 PROCEDURE — 80053 COMPREHEN METABOLIC PANEL: CPT | Performed by: EMERGENCY MEDICINE

## 2025-04-09 PROCEDURE — 85610 PROTHROMBIN TIME: CPT | Performed by: EMERGENCY MEDICINE

## 2025-04-09 PROCEDURE — 73502 X-RAY EXAM HIP UNI 2-3 VIEWS: CPT

## 2025-04-09 PROCEDURE — 99223 1ST HOSP IP/OBS HIGH 75: CPT | Performed by: INTERNAL MEDICINE

## 2025-04-09 PROCEDURE — 36415 COLL VENOUS BLD VENIPUNCTURE: CPT

## 2025-04-09 PROCEDURE — 71045 X-RAY EXAM CHEST 1 VIEW: CPT

## 2025-04-09 RX ORDER — LOPERAMIDE HYDROCHLORIDE 2 MG/1
2 CAPSULE ORAL 4 TIMES DAILY PRN
COMMUNITY

## 2025-04-09 RX ORDER — ACETAMINOPHEN 650 MG/1
650 SUPPOSITORY RECTAL EVERY 4 HOURS PRN
Status: DISCONTINUED | OUTPATIENT
Start: 2025-04-09 | End: 2025-04-12 | Stop reason: HOSPADM

## 2025-04-09 RX ORDER — SODIUM CHLORIDE 9 MG/ML
40 INJECTION, SOLUTION INTRAVENOUS AS NEEDED
Status: DISCONTINUED | OUTPATIENT
Start: 2025-04-09 | End: 2025-04-12 | Stop reason: HOSPADM

## 2025-04-09 RX ORDER — SODIUM CHLORIDE 0.9 % (FLUSH) 0.9 %
10 SYRINGE (ML) INJECTION AS NEEDED
Status: DISCONTINUED | OUTPATIENT
Start: 2025-04-09 | End: 2025-04-12 | Stop reason: HOSPADM

## 2025-04-09 RX ORDER — NALOXONE HCL 0.4 MG/ML
0.4 VIAL (ML) INJECTION
Status: DISCONTINUED | OUTPATIENT
Start: 2025-04-09 | End: 2025-04-10 | Stop reason: SDUPTHER

## 2025-04-09 RX ORDER — MORPHINE SULFATE 2 MG/ML
2 INJECTION, SOLUTION INTRAMUSCULAR; INTRAVENOUS EVERY 4 HOURS PRN
Status: DISCONTINUED | OUTPATIENT
Start: 2025-04-09 | End: 2025-04-10 | Stop reason: SDUPTHER

## 2025-04-09 RX ORDER — ACETAMINOPHEN 325 MG/1
650 TABLET ORAL EVERY 4 HOURS PRN
Status: DISCONTINUED | OUTPATIENT
Start: 2025-04-09 | End: 2025-04-12 | Stop reason: HOSPADM

## 2025-04-09 RX ORDER — POTASSIUM CHLORIDE 750 MG/1
40 CAPSULE, EXTENDED RELEASE ORAL ONCE
Status: COMPLETED | OUTPATIENT
Start: 2025-04-09 | End: 2025-04-09

## 2025-04-09 RX ORDER — ACETAMINOPHEN 160 MG/5ML
650 SOLUTION ORAL EVERY 4 HOURS PRN
Status: DISCONTINUED | OUTPATIENT
Start: 2025-04-09 | End: 2025-04-12 | Stop reason: HOSPADM

## 2025-04-09 RX ORDER — SODIUM CHLORIDE 0.9 % (FLUSH) 0.9 %
10 SYRINGE (ML) INJECTION EVERY 12 HOURS SCHEDULED
Status: DISCONTINUED | OUTPATIENT
Start: 2025-04-09 | End: 2025-04-12 | Stop reason: HOSPADM

## 2025-04-09 RX ORDER — OXYCODONE AND ACETAMINOPHEN 5; 325 MG/1; MG/1
1 TABLET ORAL EVERY 8 HOURS PRN
Status: DISCONTINUED | OUTPATIENT
Start: 2025-04-09 | End: 2025-04-10

## 2025-04-09 RX ORDER — ACETAMINOPHEN 10 MG/ML
1000 INJECTION, SOLUTION INTRAVENOUS ONCE
Status: COMPLETED | OUTPATIENT
Start: 2025-04-09 | End: 2025-04-09

## 2025-04-09 RX ADMIN — POTASSIUM CHLORIDE 40 MEQ: 750 CAPSULE, EXTENDED RELEASE ORAL at 18:12

## 2025-04-09 RX ADMIN — ACETAMINOPHEN 650 MG: 325 TABLET ORAL at 22:51

## 2025-04-09 RX ADMIN — Medication 10 ML: at 22:52

## 2025-04-09 RX ADMIN — OXYCODONE HYDROCHLORIDE AND ACETAMINOPHEN 1 TABLET: 5; 325 TABLET ORAL at 18:17

## 2025-04-09 RX ADMIN — ACETAMINOPHEN 1000 MG: 10 INJECTION INTRAVENOUS at 15:17

## 2025-04-09 NOTE — H&P
[facility] HISTORY AND PHYSICAL  Date: 2025   Patient Name: Cindy Rosa  : 1945  MRN: [id number]  Primary Care Physician:  Anabel Rai APRN  Date of admission: 2025    Subjective   Subjective     Chief Complaint: Fall    HPI:    Cindy Rosa is a 79 y.o. female past medical history of obesity BMI of 40, hypertension, dyslipidemia, GERD, breast cancer, and obstructive sleep apnea on CPAP who presents to the emergency department for a fall from standing    Patient states that she went to the dentist today.  She said she went to give her little self treat afterwards so she went to a place where she could get some cupcakes.  Says she was feeling so good she did not use her cane when she got other car but did have to use the [place] of the car to help her up on the far step.  When she took the second step she is not sure what happened but she is up on her left hip.  No fevers.  No chills Prideaux nausea.  No vomiting.  EMS was called due to the pain.    In the emergency department the patient vital signs were as follows: Temperature 97.9, pulse 81, respiratory is 20, blood pressure 135/60, 90% room air.  CBC shows white count of 12,000.  CMP shows a sodium 132 and a potassium of 3.1.  Chest x-ray shows no acute abnormality.  Hip x-ray shows acutely displaced fracture left femoral neck.  Orthopedics was consulted.  Patient admitted to hospital for management of acute hip fracture.    All systems reviewed abdomen as noted above    Personal History     Past Medical History:  Hypertension  Dyslipidemia  Bell's palsy  GERD  Breast cancer  Obesity and BMI of 40      Past Surgical History:  Ankle open reduction internal fixation  Breast biopsy on colonoscopy  D&C  Mastectomy  [place] tooth  Wrist fracture    Family History:   Stroke    Social History:   Former  Alcoholic beverage occasionally    Home Medications:  Alpha-Lipoic Acid, B-12, DULoxetine, Loperamide HCl, Loratadine, Melatonin,  Multiple Vitamins-Minerals, acetaminophen, amLODIPine, aspirin, calcium carbonate, calcium citrate-vitamin d, cholecalciferol, hydroCHLOROthiazide, metoprolol succinate XL, multivitamin with minerals, omeprazole, oxybutynin, and rosuvastatin    Allergies:  No Known Allergies        Objective   Objective     Vitals:   Temp:  [97.9 °F (36.6 °C)] 97.9 °F (36.6 °C)  Heart Rate:  [81] 81  Resp:  [20] 20  BP: (135)/(60) 135/60    Physical Exam    Constitutional: Awake, alert, no acute distress   Eyes: Pupils equal, sclerae anicteric, no conjunctival injection   HENT: NCAT, mucous membranes moist   Neck: Supple, no thyromegaly, no lymphadenopathy, trachea midline   Respiratory: Clear to auscultation bilaterally, nonlabored respirations    Cardiovascular: RRR, no murmurs, rubs, or gallops, palpable pedal pulses bilaterally   Gastrointestinal: Positive bowel sounds, soft, nontender, nondistended   Musculoskeletal: No bilateral ankle edema, no clubbing or cyanosis to extremities   Psychiatric: Appropriate affect, cooperative   Neurologic: Oriented x 3, strength symmetric in all extremities, Cranial Nerves grossly intact to confrontation, speech clear   Skin: No rashes     Result Review    Result Review:  Sodium 132 and potassium 3.1.  Glucose 139.  Blood cells 12.77  Urinalysis is bland    Chest x-ray is clear    X-ray of hip  Left femoral neck fracture      Assessment & Plan   Assessment / Plan     Assessment/Plan:   Left hip fracture requiring surgical intervention  Obesity BMI of 40  Obstructive sleep apnea on CPAP  Hypertension  Hypokalemia  Mild hyponatremia    Plan:  -- Admit to hospital service  -- Discussed the plan with the ER physician about admission.  Due to patient's injury and need for pain control as well as surgical correction patient will need to be admitted to hospital  -- Consulted orthopedics and they plan for surgery tomorrow morning  -- N.p.o. at midnight  -- Antibiotics per orthopedics  -- Oral and IV  pain medication  -- CPAP for obstructive sleep apnea.  Patient does not have her CPAP with her.  To maximize her for surgery will start her on 5 of CPAP may need to increase to 6.  -- Hold blood pressure medication due to anesthesia  -- PT/OT      VTE Prophylaxis:  SCDs but will need for aggressive DVT prophylaxis postoperatively        CODE STATUS:     Full code    Admission Status:  I believe this patient meets admission status.    Electronically signed by Alexei Moraes MD, 04/09/25, 2:39 PM EDT.
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
## 2025-04-09 NOTE — PLAN OF CARE
Goal Outcome Evaluation:  Plan of Care Reviewed With: patient           Outcome Evaluation: Pt A&Ox4, room air, medicated 1x this shift for pain, Garza cath in place, replaced K+ this shift. VSS, call light within reach and pt able to make needs known.    Ioana Mcrae RN

## 2025-04-09 NOTE — CASE MANAGEMENT/SOCIAL WORK
Discharge Planning Assessment   Nona     Patient Name: Cindy Rosa  MRN: [id number]  Today's Date: 4/9/2025    Admit Date: 4/9/2025        Discharge Needs Assessment       Row Name 04/09/25 1651       Living Environment    People in Home alone (P)     Current Living Arrangements condominium (P)     Potentially Unsafe Housing Conditions none (P)     In the past 12 months has the electric, gas, oil, or water company threatened to shut off services in your home? No (P)     Primary Care Provided by self (P)     Provides Primary Care For no one (P)     Family Caregiver if Needed child(kenan), adult (P)     Quality of Family Relationships helpful;involved;supportive (P)     Able to Return to Prior Arrangements yes (P)        Resource/Environmental Concerns    Resource/Environmental Concerns none (P)     Transportation Concerns none (P)        Transportation Needs    In the past 12 months, has lack of transportation kept you from medical appointments or from getting medications? no (P)     In the past 12 months, has lack of transportation kept you from meetings, work, or from getting things needed for daily living? No (P)        Food Insecurity    Within the past 12 months, you worried that your food would run out before you got the money to buy more. Never true (P)     Within the past 12 months, the food you bought just didn't last and you didn't have money to get more. Never true (P)        Transition Planning    Patient/Family Anticipates Transition to home (P)     Patient/Family Anticipated Services at Transition none (P)     Transportation Anticipated family or friend will provide (P)        Discharge Needs Assessment    Readmission Within the Last 30 Days no previous admission in last 30 days (P)     Current Outpatient/Agency/Support Group [facility]spiritual community (P)     Equipment Currently Used at Home cpap;cane, quad (P)     Concerns to be Addressed no discharge needs identified (P)     Do you want help  finding or keeping work or a job? I do not need or want help (P)     Do you want help with school or training? For example, starting or completing job training or getting a high school diploma, GED or equivalent No (P)     Anticipated Changes Related to Illness none (P)     Equipment Needed After Discharge none (P)                    Discharge Plan    No documentation.                      Demographic Summary       Row Name 04/09/25 1649       General Information    Admission Type inpatient (P)     Preferred Language English (P)                    Functional Status       Row Name 04/09/25 1650       Physical Activity    On average, how many days per week do you engage in moderate to strenuous exercise (like a brisk walk)? 0 days (P)     On average, how many minutes do you engage in exercise at this level? 0 min (P)     Number of minutes of exercise per week 0 (P)        Assessment of Health Literacy    How often do you have someone help you read hospital materials? Never (P)     How often do you have problems learning about your medical condition because of difficulty understanding written information? Never (P)     How often do you have a problem understanding what is told to you about your medical condition? Never (P)     How confident are you filling out medical forms by yourself? Extremely (P)     Health Literacy Excellent (P)        Functional Status, IADL    Medications independent (P)     Meal Preparation independent (P)     Housekeeping independent (P)     Laundry independent (P)     Shopping independent (P)     If for any reason you need help with day-to-day activities such as bathing, preparing meals, shopping, managing finances, etc., do you get the help you need? I don't need any help (P)                    Psychosocial       Row Name 04/09/25 1650       Mental Health    Little interest or pleasure in doing things Not at all (P)     Feeling down, depressed, or hopeless Not at all (P)        Stress    Do  you feel stress - tense, restless, nervous, or anxious, or unable to sleep at night because your mind is troubled all the time - these days? Not at all (P)        Developmental Stage (Eriksson's Stages of Development)    Developmental Stage Stage 8 (65 years-death/Late Adulthood) Integrity vs. Despair (P)                    Abuse/Neglect       Row Name 04/09/25 1650       Personal Safety    Feels Unsafe at Home or Work/School no (P)     Feels Threatened by Someone no (P)     Does Anyone Try to Keep You From Having Contact with Others or Doing Things Outside Your Home? no (P)     Physical Signs of Abuse Present no (P)                    Legal       Row Name 04/09/25 1650       Financial Resource Strain    How hard is it for you to pay for the very basics like food, housing, medical care, and heating? Not hard (P)                    Substance Abuse    No documentation.                  Patient Forms    No documentation.                 Sw met with pt at bedside on this date. Pt lives at home by herself but has a neighbor to check on her if needed. Pt uses cpap and cane. Pt has her [facility] community as her support group. Pt denies any needs at this time.    Kelsy Orellana, Social Work Student

## 2025-04-10 ENCOUNTER — APPOINTMENT (OUTPATIENT)
Dept: GENERAL RADIOLOGY | Facility: HOSPITAL | Age: 80
DRG: 522 | End: 2025-04-10
Payer: MEDICARE

## 2025-04-10 ENCOUNTER — ANESTHESIA (OUTPATIENT)
Dept: PERIOP | Facility: HOSPITAL | Age: 80
End: 2025-04-10
Payer: MEDICARE

## 2025-04-10 LAB
ALBUMIN SERPL-MCNC: 3.8 G/DL (ref 3.5–5.2)
ALBUMIN/GLOB SERPL: 1.3 G/DL
ALP SERPL-CCNC: 107 U/L (ref 39–117)
ALT SERPL W P-5'-P-CCNC: 16 U/L (ref 1–33)
ANION GAP SERPL CALCULATED.3IONS-SCNC: 13.1 MMOL/L (ref 5–15)
AST SERPL-CCNC: 21 U/L (ref 1–32)
BASOPHILS # BLD AUTO: 0.05 10*3/MM3 (ref 0–0.2)
BASOPHILS NFR BLD AUTO: 0.5 % (ref 0–1.5)
BILIRUB SERPL-MCNC: 0.7 MG/DL (ref 0–1.2)
BUN SERPL-MCNC: 16 MG/DL (ref 8–23)
BUN/CREAT SERPL: 15 (ref 7–25)
CALCIUM SPEC-SCNC: 9 MG/DL (ref 8.6–10.5)
CHLORIDE SERPL-SCNC: 96 MMOL/L (ref 98–107)
CO2 SERPL-SCNC: 25.9 MMOL/L (ref 22–29)
CREAT SERPL-MCNC: 1.07 MG/DL (ref 0.57–1)
DEPRECATED RDW RBC AUTO: 41.4 FL (ref 37–54)
EGFRCR SERPLBLD CKD-EPI 2021: 53.6 ML/MIN/1.73
EOSINOPHIL # BLD AUTO: 0.25 10*3/MM3 (ref 0–0.4)
EOSINOPHIL NFR BLD AUTO: 2.4 % (ref 0.3–6.2)
ERYTHROCYTE [DISTWIDTH] IN BLOOD BY AUTOMATED COUNT: 13.3 % (ref 12.3–15.4)
GLOBULIN UR ELPH-MCNC: 2.9 GM/DL
GLUCOSE SERPL-MCNC: 116 MG/DL (ref 65–99)
HCT VFR BLD AUTO: 37.8 % (ref 34–46.6)
HGB BLD-MCNC: 12.9 G/DL (ref 12–15.9)
IMM GRANULOCYTES # BLD AUTO: 0.02 10*3/MM3 (ref 0–0.05)
IMM GRANULOCYTES NFR BLD AUTO: 0.2 % (ref 0–0.5)
LYMPHOCYTES # BLD AUTO: 2.48 10*3/MM3 (ref 0.7–3.1)
LYMPHOCYTES NFR BLD AUTO: 23.8 % (ref 19.6–45.3)
MAGNESIUM SERPL-MCNC: 1.7 MG/DL (ref 1.6–2.4)
MCH RBC QN AUTO: 29.1 PG (ref 26.6–33)
MCHC RBC AUTO-ENTMCNC: 34.1 G/DL (ref 31.5–35.7)
MCV RBC AUTO: 85.3 FL (ref 79–97)
MONOCYTES # BLD AUTO: 0.58 10*3/MM3 (ref 0.1–0.9)
MONOCYTES NFR BLD AUTO: 5.6 % (ref 5–12)
NEUTROPHILS NFR BLD AUTO: 67.5 % (ref 42.7–76)
NEUTROPHILS NFR BLD AUTO: 7.03 10*3/MM3 (ref 1.7–7)
NRBC BLD AUTO-RTO: 0 /100 WBC (ref 0–0.2)
PLATELET # BLD AUTO: 275 10*3/MM3 (ref 140–450)
PMV BLD AUTO: 9 FL (ref 6–12)
POTASSIUM SERPL-SCNC: 3.1 MMOL/L (ref 3.5–5.2)
PROT SERPL-MCNC: 6.7 G/DL (ref 6–8.5)
QT INTERVAL: 391 MS
QTC INTERVAL: 423 MS
RBC # BLD AUTO: 4.43 10*6/MM3 (ref 3.77–5.28)
SODIUM SERPL-SCNC: 135 MMOL/L (ref 136–145)
WBC NRBC COR # BLD AUTO: 10.41 10*3/MM3 (ref 3.4–10.8)

## 2025-04-10 PROCEDURE — 25010000002 EPINEPHRINE 1 MG/ML SOLUTION: Performed by: STUDENT IN AN ORGANIZED HEALTH CARE EDUCATION/TRAINING PROGRAM

## 2025-04-10 PROCEDURE — 25010000002 MIDAZOLAM PER 1MG: Performed by: ANESTHESIOLOGY

## 2025-04-10 PROCEDURE — 25010000002 SUGAMMADEX 200 MG/2ML SOLUTION: Performed by: ANESTHESIOLOGY

## 2025-04-10 PROCEDURE — 25010000002 LIDOCAINE PF 2% 2 % SOLUTION

## 2025-04-10 PROCEDURE — 25010000002 KETOROLAC TROMETHAMINE PER 15 MG: Performed by: STUDENT IN AN ORGANIZED HEALTH CARE EDUCATION/TRAINING PROGRAM

## 2025-04-10 PROCEDURE — 25010000002 DEXAMETHASONE PER 1 MG

## 2025-04-10 PROCEDURE — 93005 ELECTROCARDIOGRAM TRACING: CPT | Performed by: ANESTHESIOLOGY

## 2025-04-10 PROCEDURE — 94761 N-INVAS EAR/PLS OXIMETRY MLT: CPT

## 2025-04-10 PROCEDURE — 25010000002 FENTANYL CITRATE (PF) 50 MCG/ML SOLUTION

## 2025-04-10 PROCEDURE — 25010000002 CEFAZOLIN PER 500 MG: Performed by: STUDENT IN AN ORGANIZED HEALTH CARE EDUCATION/TRAINING PROGRAM

## 2025-04-10 PROCEDURE — 85025 COMPLETE CBC W/AUTO DIFF WBC: CPT | Performed by: INTERNAL MEDICINE

## 2025-04-10 PROCEDURE — 83735 ASSAY OF MAGNESIUM: CPT | Performed by: INTERNAL MEDICINE

## 2025-04-10 PROCEDURE — 25010000002 ROPIVACAINE PER 1 MG: Performed by: STUDENT IN AN ORGANIZED HEALTH CARE EDUCATION/TRAINING PROGRAM

## 2025-04-10 PROCEDURE — 73502 X-RAY EXAM HIP UNI 2-3 VIEWS: CPT

## 2025-04-10 PROCEDURE — 25010000002 ONDANSETRON PER 1 MG: Performed by: NURSE ANESTHETIST, CERTIFIED REGISTERED

## 2025-04-10 PROCEDURE — 25010000002 PROPOFOL 10 MG/ML EMULSION

## 2025-04-10 PROCEDURE — 99233 SBSQ HOSP IP/OBS HIGH 50: CPT | Performed by: STUDENT IN AN ORGANIZED HEALTH CARE EDUCATION/TRAINING PROGRAM

## 2025-04-10 PROCEDURE — 93010 ELECTROCARDIOGRAM REPORT: CPT | Performed by: INTERNAL MEDICINE

## 2025-04-10 PROCEDURE — 25810000003 LACTATED RINGERS PER 1000 ML: Performed by: ANESTHESIOLOGY

## 2025-04-10 PROCEDURE — 94799 UNLISTED PULMONARY SVC/PX: CPT

## 2025-04-10 PROCEDURE — 25010000002 METOCLOPRAMIDE PER 10 MG: Performed by: ANESTHESIOLOGY

## 2025-04-10 PROCEDURE — P9041 ALBUMIN (HUMAN),5%, 50ML: HCPCS

## 2025-04-10 PROCEDURE — 25010000002 HYDROMORPHONE PER 4 MG: Performed by: STUDENT IN AN ORGANIZED HEALTH CARE EDUCATION/TRAINING PROGRAM

## 2025-04-10 PROCEDURE — C1776 JOINT DEVICE (IMPLANTABLE): HCPCS | Performed by: STUDENT IN AN ORGANIZED HEALTH CARE EDUCATION/TRAINING PROGRAM

## 2025-04-10 PROCEDURE — 25810000003 LACTATED RINGERS PER 1000 ML: Performed by: STUDENT IN AN ORGANIZED HEALTH CARE EDUCATION/TRAINING PROGRAM

## 2025-04-10 PROCEDURE — 25010000002 ALBUMIN HUMAN 5% PER 50 ML

## 2025-04-10 PROCEDURE — 80053 COMPREHEN METABOLIC PANEL: CPT | Performed by: INTERNAL MEDICINE

## 2025-04-10 PROCEDURE — 25010000002 HYDROMORPHONE 1 MG/ML SOLUTION: Performed by: NURSE ANESTHETIST, CERTIFIED REGISTERED

## 2025-04-10 PROCEDURE — 76000 FLUOROSCOPY <1 HR PHYS/QHP: CPT

## 2025-04-10 PROCEDURE — 99222 1ST HOSP IP/OBS MODERATE 55: CPT | Performed by: STUDENT IN AN ORGANIZED HEALTH CARE EDUCATION/TRAINING PROGRAM

## 2025-04-10 PROCEDURE — C1713 ANCHOR/SCREW BN/BN,TIS/BN: HCPCS | Performed by: STUDENT IN AN ORGANIZED HEALTH CARE EDUCATION/TRAINING PROGRAM

## 2025-04-10 PROCEDURE — 25010000002 MORPHINE PER 10 MG: Performed by: INTERNAL MEDICINE

## 2025-04-10 PROCEDURE — 0SRS0J9 REPLACEMENT OF LEFT HIP JOINT, FEMORAL SURFACE WITH SYNTHETIC SUBSTITUTE, CEMENTED, OPEN APPROACH: ICD-10-PCS | Performed by: FAMILY MEDICINE

## 2025-04-10 PROCEDURE — 27236 TREAT THIGH FRACTURE: CPT | Performed by: STUDENT IN AN ORGANIZED HEALTH CARE EDUCATION/TRAINING PROGRAM

## 2025-04-10 DEVICE — CAP PRT HIP BIPOL: Type: IMPLANTABLE DEVICE | Site: HIP | Status: FUNCTIONAL

## 2025-04-10 DEVICE — AVENIR® STANDARD STEM CEMENTED 1
Type: IMPLANTABLE DEVICE | Site: HIP | Status: FUNCTIONAL
Brand: AVENIR®

## 2025-04-10 DEVICE — HD FEM/HIP UNIV COCR 12/14TPR 28MM MIN3.5: Type: IMPLANTABLE DEVICE | Site: HIP | Status: FUNCTIONAL

## 2025-04-10 DEVICE — BONE PREPARATION KIT
Type: IMPLANTABLE DEVICE | Site: HIP | Status: FUNCTIONAL
Brand: BIOPREP

## 2025-04-10 DEVICE — CMT BONE PALACOS R HI/VISC 1X40: Type: IMPLANTABLE DEVICE | Site: HIP | Status: FUNCTIONAL

## 2025-04-10 DEVICE — SUT NONABS MAXBRAID NMBR5 K60 38IN WHT/BLU: Type: IMPLANTABLE DEVICE | Site: HIP | Status: FUNCTIONAL

## 2025-04-10 DEVICE — CUP ACET RINGLOC BIPOL 28MM 47MM: Type: IMPLANTABLE DEVICE | Site: HIP | Status: FUNCTIONAL

## 2025-04-10 RX ORDER — FERROUS SULFATE 325(65) MG
325 TABLET ORAL
Status: DISCONTINUED | OUTPATIENT
Start: 2025-04-11 | End: 2025-04-12 | Stop reason: HOSPADM

## 2025-04-10 RX ORDER — PETROLATUM,WHITE
OINTMENT IN PACKET (GRAM) TOPICAL AS NEEDED
Status: DISCONTINUED | OUTPATIENT
Start: 2025-04-10 | End: 2025-04-10 | Stop reason: SURG

## 2025-04-10 RX ORDER — LIDOCAINE HYDROCHLORIDE 20 MG/ML
INJECTION, SOLUTION EPIDURAL; INFILTRATION; INTRACAUDAL; PERINEURAL AS NEEDED
Status: DISCONTINUED | OUTPATIENT
Start: 2025-04-10 | End: 2025-04-10 | Stop reason: SURG

## 2025-04-10 RX ORDER — DEXAMETHASONE SODIUM PHOSPHATE 4 MG/ML
INJECTION, SOLUTION INTRA-ARTICULAR; INTRALESIONAL; INTRAMUSCULAR; INTRAVENOUS; SOFT TISSUE AS NEEDED
Status: DISCONTINUED | OUTPATIENT
Start: 2025-04-10 | End: 2025-04-10 | Stop reason: SURG

## 2025-04-10 RX ORDER — ACETAMINOPHEN 325 MG/1
325 TABLET ORAL EVERY 4 HOURS PRN
Status: DISCONTINUED | OUTPATIENT
Start: 2025-04-10 | End: 2025-04-12 | Stop reason: HOSPADM

## 2025-04-10 RX ORDER — PROMETHAZINE HYDROCHLORIDE 25 MG/1
25 TABLET ORAL ONCE AS NEEDED
Status: DISCONTINUED | OUTPATIENT
Start: 2025-04-10 | End: 2025-04-10 | Stop reason: HOSPADM

## 2025-04-10 RX ORDER — AMOXICILLIN 250 MG
2 CAPSULE ORAL 2 TIMES DAILY
Status: DISCONTINUED | OUTPATIENT
Start: 2025-04-10 | End: 2025-04-12 | Stop reason: HOSPADM

## 2025-04-10 RX ORDER — MIDAZOLAM HYDROCHLORIDE 2 MG/2ML
1 INJECTION, SOLUTION INTRAMUSCULAR; INTRAVENOUS ONCE
Status: COMPLETED | OUTPATIENT
Start: 2025-04-10 | End: 2025-04-10

## 2025-04-10 RX ORDER — SODIUM CHLORIDE 0.9 % (FLUSH) 0.9 %
10 SYRINGE (ML) INJECTION EVERY 12 HOURS SCHEDULED
Status: DISCONTINUED | OUTPATIENT
Start: 2025-04-10 | End: 2025-04-12 | Stop reason: HOSPADM

## 2025-04-10 RX ORDER — HYDROCODONE BITARTRATE AND ACETAMINOPHEN 5; 325 MG/1; MG/1
2 TABLET ORAL EVERY 4 HOURS PRN
Status: DISCONTINUED | OUTPATIENT
Start: 2025-04-10 | End: 2025-04-12 | Stop reason: HOSPADM

## 2025-04-10 RX ORDER — PROMETHAZINE HYDROCHLORIDE 25 MG/1
25 SUPPOSITORY RECTAL ONCE AS NEEDED
Status: DISCONTINUED | OUTPATIENT
Start: 2025-04-10 | End: 2025-04-10 | Stop reason: HOSPADM

## 2025-04-10 RX ORDER — MAGNESIUM HYDROXIDE 1200 MG/15ML
LIQUID ORAL AS NEEDED
Status: DISCONTINUED | OUTPATIENT
Start: 2025-04-10 | End: 2025-04-10 | Stop reason: HOSPADM

## 2025-04-10 RX ORDER — NALOXONE HCL 0.4 MG/ML
0.4 VIAL (ML) INJECTION
Status: DISCONTINUED | OUTPATIENT
Start: 2025-04-10 | End: 2025-04-12 | Stop reason: HOSPADM

## 2025-04-10 RX ORDER — SODIUM CHLORIDE 9 MG/ML
40 INJECTION, SOLUTION INTRAVENOUS AS NEEDED
Status: DISCONTINUED | OUTPATIENT
Start: 2025-04-10 | End: 2025-04-12 | Stop reason: HOSPADM

## 2025-04-10 RX ORDER — ENOXAPARIN SODIUM 100 MG/ML
40 INJECTION SUBCUTANEOUS EVERY 24 HOURS
Status: DISCONTINUED | OUTPATIENT
Start: 2025-04-11 | End: 2025-04-12 | Stop reason: HOSPADM

## 2025-04-10 RX ORDER — ALBUMIN HUMAN 50 G/1000ML
SOLUTION INTRAVENOUS CONTINUOUS PRN
Status: DISCONTINUED | OUTPATIENT
Start: 2025-04-10 | End: 2025-04-10 | Stop reason: SURG

## 2025-04-10 RX ORDER — BUPIVACAINE HCL/0.9 % NACL/PF 0.125 %
PLASTIC BAG, INJECTION (ML) EPIDURAL AS NEEDED
Status: DISCONTINUED | OUTPATIENT
Start: 2025-04-10 | End: 2025-04-10 | Stop reason: SURG

## 2025-04-10 RX ORDER — SODIUM CHLORIDE, SODIUM LACTATE, POTASSIUM CHLORIDE, CALCIUM CHLORIDE 600; 310; 30; 20 MG/100ML; MG/100ML; MG/100ML; MG/100ML
100 INJECTION, SOLUTION INTRAVENOUS CONTINUOUS
Status: ACTIVE | OUTPATIENT
Start: 2025-04-10 | End: 2025-04-11

## 2025-04-10 RX ORDER — FENTANYL CITRATE 50 UG/ML
INJECTION, SOLUTION INTRAMUSCULAR; INTRAVENOUS AS NEEDED
Status: DISCONTINUED | OUTPATIENT
Start: 2025-04-10 | End: 2025-04-10 | Stop reason: SURG

## 2025-04-10 RX ORDER — PROPOFOL 10 MG/ML
VIAL (ML) INTRAVENOUS AS NEEDED
Status: DISCONTINUED | OUTPATIENT
Start: 2025-04-10 | End: 2025-04-10 | Stop reason: SURG

## 2025-04-10 RX ORDER — BISACODYL 10 MG
10 SUPPOSITORY, RECTAL RECTAL DAILY PRN
Status: DISCONTINUED | OUTPATIENT
Start: 2025-04-10 | End: 2025-04-12 | Stop reason: HOSPADM

## 2025-04-10 RX ORDER — METOPROLOL TARTRATE 1 MG/ML
INJECTION, SOLUTION INTRAVENOUS AS NEEDED
Status: DISCONTINUED | OUTPATIENT
Start: 2025-04-10 | End: 2025-04-10 | Stop reason: SURG

## 2025-04-10 RX ORDER — MORPHINE SULFATE 2 MG/ML
2 INJECTION, SOLUTION INTRAMUSCULAR; INTRAVENOUS
Status: DISCONTINUED | OUTPATIENT
Start: 2025-04-10 | End: 2025-04-12 | Stop reason: HOSPADM

## 2025-04-10 RX ORDER — ONDANSETRON 4 MG/1
4 TABLET, ORALLY DISINTEGRATING ORAL EVERY 6 HOURS PRN
Status: DISCONTINUED | OUTPATIENT
Start: 2025-04-10 | End: 2025-04-12 | Stop reason: HOSPADM

## 2025-04-10 RX ORDER — POLYETHYLENE GLYCOL 3350 17 G/17G
17 POWDER, FOR SOLUTION ORAL DAILY
Status: DISCONTINUED | OUTPATIENT
Start: 2025-04-11 | End: 2025-04-12 | Stop reason: HOSPADM

## 2025-04-10 RX ORDER — ONDANSETRON 2 MG/ML
4 INJECTION INTRAMUSCULAR; INTRAVENOUS ONCE AS NEEDED
Status: DISCONTINUED | OUTPATIENT
Start: 2025-04-10 | End: 2025-04-10 | Stop reason: HOSPADM

## 2025-04-10 RX ORDER — ONDANSETRON 2 MG/ML
INJECTION INTRAMUSCULAR; INTRAVENOUS AS NEEDED
Status: DISCONTINUED | OUTPATIENT
Start: 2025-04-10 | End: 2025-04-10 | Stop reason: SURG

## 2025-04-10 RX ORDER — PROMETHAZINE HYDROCHLORIDE 25 MG/1
12.5 TABLET ORAL EVERY 6 HOURS PRN
Status: DISCONTINUED | OUTPATIENT
Start: 2025-04-10 | End: 2025-04-12 | Stop reason: HOSPADM

## 2025-04-10 RX ORDER — FAMOTIDINE 20 MG/1
40 TABLET, FILM COATED ORAL DAILY
Status: DISCONTINUED | OUTPATIENT
Start: 2025-04-11 | End: 2025-04-12 | Stop reason: HOSPADM

## 2025-04-10 RX ORDER — ACETAMINOPHEN 500 MG
1000 TABLET ORAL ONCE
Status: COMPLETED | OUTPATIENT
Start: 2025-04-10 | End: 2025-04-10

## 2025-04-10 RX ORDER — SODIUM CHLORIDE, SODIUM LACTATE, POTASSIUM CHLORIDE, CALCIUM CHLORIDE 600; 310; 30; 20 MG/100ML; MG/100ML; MG/100ML; MG/100ML
20 INJECTION, SOLUTION INTRAVENOUS CONTINUOUS PRN
Status: ACTIVE | OUTPATIENT
Start: 2025-04-10 | End: 2025-04-11

## 2025-04-10 RX ORDER — HYDROCODONE BITARTRATE AND ACETAMINOPHEN 5; 325 MG/1; MG/1
1 TABLET ORAL EVERY 4 HOURS PRN
Status: DISCONTINUED | OUTPATIENT
Start: 2025-04-10 | End: 2025-04-11

## 2025-04-10 RX ORDER — ACETAMINOPHEN 500 MG
1000 TABLET ORAL EVERY 8 HOURS
Status: DISCONTINUED | OUTPATIENT
Start: 2025-04-10 | End: 2025-04-12 | Stop reason: HOSPADM

## 2025-04-10 RX ORDER — ONDANSETRON 2 MG/ML
4 INJECTION INTRAMUSCULAR; INTRAVENOUS EVERY 6 HOURS PRN
Status: DISCONTINUED | OUTPATIENT
Start: 2025-04-10 | End: 2025-04-12 | Stop reason: HOSPADM

## 2025-04-10 RX ORDER — PROMETHAZINE HYDROCHLORIDE 12.5 MG/1
12.5 SUPPOSITORY RECTAL EVERY 6 HOURS PRN
Status: DISCONTINUED | OUTPATIENT
Start: 2025-04-10 | End: 2025-04-12 | Stop reason: HOSPADM

## 2025-04-10 RX ORDER — METOCLOPRAMIDE HYDROCHLORIDE 5 MG/ML
10 INJECTION INTRAMUSCULAR; INTRAVENOUS
Status: COMPLETED | OUTPATIENT
Start: 2025-04-11 | End: 2025-04-10

## 2025-04-10 RX ORDER — ROCURONIUM BROMIDE 10 MG/ML
INJECTION, SOLUTION INTRAVENOUS AS NEEDED
Status: DISCONTINUED | OUTPATIENT
Start: 2025-04-10 | End: 2025-04-10 | Stop reason: SURG

## 2025-04-10 RX ORDER — OXYCODONE HYDROCHLORIDE 5 MG/1
5 TABLET ORAL
Status: DISCONTINUED | OUTPATIENT
Start: 2025-04-10 | End: 2025-04-10 | Stop reason: HOSPADM

## 2025-04-10 RX ORDER — SODIUM CHLORIDE 0.9 % (FLUSH) 0.9 %
10 SYRINGE (ML) INJECTION AS NEEDED
Status: DISCONTINUED | OUTPATIENT
Start: 2025-04-10 | End: 2025-04-12 | Stop reason: HOSPADM

## 2025-04-10 RX ADMIN — Medication 100 MCG: at 18:16

## 2025-04-10 RX ADMIN — METOPROLOL TARTRATE 2.5 MG: 1 INJECTION, SOLUTION INTRAVENOUS at 19:20

## 2025-04-10 RX ADMIN — ONDANSETRON 4 MG: 2 INJECTION INTRAMUSCULAR; INTRAVENOUS at 18:26

## 2025-04-10 RX ADMIN — ALBUMIN (HUMAN): 12.5 INJECTION, SOLUTION INTRAVENOUS at 17:44

## 2025-04-10 RX ADMIN — SODIUM CHLORIDE, POTASSIUM CHLORIDE, SODIUM LACTATE AND CALCIUM CHLORIDE 100 ML/HR: 600; 310; 30; 20 INJECTION, SOLUTION INTRAVENOUS at 22:06

## 2025-04-10 RX ADMIN — METOCLOPRAMIDE 10 MG: 5 INJECTION, SOLUTION INTRAMUSCULAR; INTRAVENOUS at 16:59

## 2025-04-10 RX ADMIN — Medication 100 MCG: at 18:07

## 2025-04-10 RX ADMIN — ROCURONIUM BROMIDE 20 MG: 10 INJECTION, SOLUTION INTRAVENOUS at 18:54

## 2025-04-10 RX ADMIN — SODIUM CHLORIDE, POTASSIUM CHLORIDE, SODIUM LACTATE AND CALCIUM CHLORIDE: 600; 310; 30; 20 INJECTION, SOLUTION INTRAVENOUS at 18:07

## 2025-04-10 RX ADMIN — ACETAMINOPHEN 1000 MG: 500 TABLET ORAL at 22:03

## 2025-04-10 RX ADMIN — DEXAMETHASONE SODIUM PHOSPHATE 4 MG: 4 INJECTION, SOLUTION INTRAMUSCULAR; INTRAVENOUS at 17:28

## 2025-04-10 RX ADMIN — SUGAMMADEX 200 MG: 100 INJECTION, SOLUTION INTRAVENOUS at 19:20

## 2025-04-10 RX ADMIN — SODIUM CHLORIDE, POTASSIUM CHLORIDE, SODIUM LACTATE AND CALCIUM CHLORIDE: 600; 310; 30; 20 INJECTION, SOLUTION INTRAVENOUS at 17:05

## 2025-04-10 RX ADMIN — MORPHINE SULFATE 2 MG: 2 INJECTION, SOLUTION INTRAMUSCULAR; INTRAVENOUS at 05:06

## 2025-04-10 RX ADMIN — LIDOCAINE HYDROCHLORIDE 40 MG: 20 INJECTION, SOLUTION INTRAVENOUS at 17:09

## 2025-04-10 RX ADMIN — PROPOFOL 40 MG: 10 INJECTION, EMULSION INTRAVENOUS at 19:15

## 2025-04-10 RX ADMIN — SENNOSIDES AND DOCUSATE SODIUM 2 TABLET: 50; 8.6 TABLET ORAL at 22:04

## 2025-04-10 RX ADMIN — SODIUM CHLORIDE 2000 MG: 9 INJECTION, SOLUTION INTRAVENOUS at 17:20

## 2025-04-10 RX ADMIN — FENTANYL CITRATE 50 MCG: 50 INJECTION, SOLUTION INTRAMUSCULAR; INTRAVENOUS at 17:09

## 2025-04-10 RX ADMIN — Medication 10 ML: at 09:44

## 2025-04-10 RX ADMIN — Medication 10 ML: at 22:07

## 2025-04-10 RX ADMIN — MIDAZOLAM HYDROCHLORIDE 1 MG: 2 INJECTION, SOLUTION INTRAMUSCULAR; INTRAVENOUS at 16:57

## 2025-04-10 RX ADMIN — PROPOFOL 120 MG: 10 INJECTION, EMULSION INTRAVENOUS at 17:09

## 2025-04-10 RX ADMIN — HYDROMORPHONE HYDROCHLORIDE 0.5 MG: 1 INJECTION, SOLUTION INTRAMUSCULAR; INTRAVENOUS; SUBCUTANEOUS at 18:34

## 2025-04-10 RX ADMIN — FENTANYL CITRATE 50 MCG: 50 INJECTION, SOLUTION INTRAMUSCULAR; INTRAVENOUS at 17:17

## 2025-04-10 RX ADMIN — MORPHINE SULFATE 2 MG: 2 INJECTION, SOLUTION INTRAMUSCULAR; INTRAVENOUS at 11:43

## 2025-04-10 RX ADMIN — ACETAMINOPHEN 1000 MG: 500 TABLET ORAL at 16:57

## 2025-04-10 RX ADMIN — ROCURONIUM BROMIDE 80 MG: 10 INJECTION, SOLUTION INTRAVENOUS at 17:09

## 2025-04-10 RX ADMIN — Medication 1 PACKAGE: at 17:17

## 2025-04-10 NOTE — SIGNIFICANT NOTE
04/10/25 0539   OTHER   Discipline occupational therapist   Rehab Time/Intention   Session Not Performed   (scheduled for ortho surgery today)

## 2025-04-10 NOTE — PLAN OF CARE
Goal Outcome Evaluation:           Progress: no change  Outcome Evaluation: Patient is currently down in surgery to repair her left femoral neck fracture. Left the floor at 1606. At time of assessment, patient was alert and oriented, able to make needs known. On 2L NC. Medicated x1 for pain this shift. New IV placed via ultrasound. Rehab placement conversations pending.    Kayla Kebede RN

## 2025-04-10 NOTE — ANESTHESIA PREPROCEDURE EVALUATION
Anesthesia Evaluation     Patient summary reviewed and Nursing notes reviewed   no history of anesthetic complications:   NPO Solid Status: > 8 hours  NPO Liquid Status: > 2 hours           Airway   Mallampati: III  TM distance: >3 FB  Neck ROM: full  Possible difficult intubation and Small opening  Dental      Pulmonary - normal exam    breath sounds clear to auscultation  (+) ,shortness of breath, sleep apnea  Cardiovascular - normal exam  Exercise tolerance: good (4-7 METS)    Rhythm: regular  Rate: normal    (+) hypertension, hyperlipidemia      Neuro/Psych  (+) numbness  GI/Hepatic/Renal/Endo    (+) GERD, diabetes mellitus type 2    Musculoskeletal     Abdominal    Substance History - negative use     OB/GYN negative ob/gyn ROS         Other   arthritis,   history of cancer (hx of breast cancer)    ROS/Med Hx Other: PAT Nursing Notes unavailable.                     Anesthesia Plan    ASA 3     general     Reason for not using neuraxial anesthesia or peripheral nerve block: Patient Preference  (Patient understands anesthesia not responsible for dental damage.    Pt w/ small opening will need video laryngoscopy available)  intravenous induction     Anesthetic plan, risks, benefits, and alternatives have been provided, discussed and informed consent has been obtained with: patient.    Use of blood products discussed with patient .    Plan discussed with CRNA.        CODE STATUS:    Code Status (Patient has no pulse and is not breathing): CPR (Attempt to Resuscitate)  Medical Interventions (Patient has pulse or is breathing): Full Support  Level Of Support Discussed With: Patient

## 2025-04-10 NOTE — PLAN OF CARE
Goal Outcome Evaluation:  Plan of Care Reviewed With: patient        Progress: no change  Outcome Evaluation: Patient wore CPAP 5 all night with no issues. When off CPAP she is on room air.

## 2025-04-10 NOTE — PLAN OF CARE
"Nurse called to room, patient had voided bloody urine with 2 medium clots. Patient expresses he had to "bare down" when trying to void and had some burning sensation. Denies any lightheadedness, dizziness, pain, or SOB. Called and made Dr Swain aware. Per Dr Swain advised still okay for patient to discharge home. Notified patient and verbalized understanding.   " Goal Outcome Evaluation:              Outcome Evaluation: VSS. Pain managed. A&Ox4.  No acute distress noted.  Left Hip Sx Tentative for 5:50 PM on 4-10-25. NPO since midnight.

## 2025-04-10 NOTE — PROGRESS NOTES
[facility]     Progress Note    Patient Name: Cindy Rosa  : 1945  MRN: [id number]  Primary Care Physician:  Anabel Rai APRN  Date of admission: 2025    Subjective   Subjective     Chief Complaint: Left hip pain    HPI:  Patient is pending left hip ORIF.  No major overnight events.  Pain is managed with current medication regimen    Review of Systems   All systems were reviewed and negative except for: Previously mentioned    Objective   Objective     Vitals:   Temp:  [97.3 °F (36.3 °C)-98.4 °F (36.9 °C)] 98.4 °F (36.9 °C)  Heart Rate:  [66-86] 66  Resp:  [13-20] 16  BP: (112-140)/(44-71) 113/44  Physical Exam    Constitutional: Awake, alert   Eyes: PERRLA, sclerae anicteric, no conjunctival injection   HENT: NCAT, mucous membranes moist   Neck: Supple, no thyromegaly, no lymphadenopathy, trachea midline   Respiratory: Clear to auscultation bilaterally, nonlabored respirations    Cardiovascular: RRR, no murmurs, rubs, or gallops, palpable pedal pulses bilaterally   Gastrointestinal: Positive bowel sounds, soft, nontender, nondistended   Musculoskeletal: No bilateral ankle edema, no clubbing or cyanosis to extremities   Psychiatric: Appropriate affect, cooperative   Neurologic: Oriented x 3, strength symmetric in all extremities, Cranial Nerves grossly intact to confrontation, speech clear   Skin: No rashes     Result Review    Result Review:  I have personally reviewed the results from the time of this admission to 4/10/2025 10:57 EDT and agree with these findings:  [x]  Laboratory list / accordion  [x]  Microbiology  [x]  Radiology  [x]  EKG/Telemetry   [x]  Cardiology/Vascular   [x]  Pathology  [x]  Old records  []  Other:      Assessment & Plan   Assessment / Plan     Assessment/Plan:   Left hip fracture requiring surgical intervention  Obesity BMI of 40  Obstructive sleep apnea on CPAP  Hypertension  Hypokalemia  Mild hyponatremia     Plan:  --Plan for OR today  -- N.p.o.  -- Preop  antibiotics  -- Oral and IV pain medication  -- CPAP for obstructive sleep apnea.  Patient does not have her CPAP with her.  To maximize her for surgery will start her on 5 of CPAP may need to increase to 6.  -- Hold blood pressure medication due to anesthesia  -- PT/OT       VTE Prophylaxis:  Mechanical VTE prophylaxis orders are present.        CODE STATUS:   Code Status (Patient has no pulse and is not breathing): CPR (Attempt to Resuscitate)  Medical Interventions (Patient has pulse or is breathing): Full Support  Level Of Support Discussed With: Patient    Disposition: Pending surgery    Skinny Verdugo MD

## 2025-04-10 NOTE — OP NOTE
TOTAL HIP ARTHROPLASTY ANTERIOR  Procedure Report    Patient Name:  Cindy Rosa  YOB: 1945    Date of Surgery:  4/10/2025     Indications: Cindy is a 79-year-old female with a left femoral neck fracture. We discussed treatment options including both operative and nonoperative management. We specifically discussed left hip hemiarthroplasty. We discussed the primary benefits of the surgery including pain relief and improved hip function. We discussed surgical risks including bleeding, infection, damage to nerves and blood vessels, hardware complications, fracture, instability, loosening, leg length discrepancy, persistent pain, anesthesia risk including mortality, DVT/PE, and need for additional procedures. The patient elected to proceed with surgery and consent was obtained.    Pre-op Diagnosis:   Left displaced femoral neck fracture [S72.002A]       Post-Op Diagnosis Codes:     * Left displaced femoral neck fracture [S72.002A]    Procedure(s):  Left hip hemiarthroplasty  Staff:  Surgeon(s):  Tree Reese MD    Assistant: Minnie Lopez    Anesthesia: General    Estimated Blood Loss: 200ml    Implants:    Implant Name Type Inv. Item Serial No.  Lot No. LRB No. Used Action   SUT NONABS MAXBRAID NMBR5 K60 38IN WHT/HORACIO - [id number] Implant SUT NONABS MAXBRAID NMBR5 K60 38IN WHT/HORACIO  [employer] [id number] Left 2 Implanted   CMT BONE PALACOS R HI/VISC 1X40 - [id number] Implant CMT BONE PALACOS R HI/VISC 1X40  [facility] [id number] Left 1 Implanted   CMT BONE PALACOS R HI/VISC 1X40 - [id number] Implant CMT BONE PALACOS R HI/VISC 1X40  [facility] [id number] Left 1 Implanted   STEM FEM/HIP AVENIR CMT STD SZ1 - [id number] Implant STEM FEM/HIP AVENIR CMT STD SZ1  [employer] [id number] Left 1 Implanted   KT BONE BIO PREP 6EA C/S - [id number] Implant KT BONE BIO PREP 6EA C/S  [employer] [id number] Left 1 Implanted   CUP ACET RINGLOC BIPOL 28MM 47MM - [id number] Implant CUP ACET RINGLOC  BIPOL 28MM 47MM  [employer] [id number] Left 1 Implanted   HD FEM/HIP UNIV COCR 12/14TPR 28MM MIN3.5 - [id number] Implant HD FEM/HIP UNIV COCR 12/14TPR 28MM MIN3.5  [employer] [id number] Left 1 Implanted       Specimen:          None        Findings: Displaced left femoral neck fracture    Complications: None    Description of Procedure: The patient was met in the preoperative holding area and the operative extremity was marked.  The patient was transported to the operating room and general anesthesia was induced without complication.  The patient was then carefully transferred onto the [place] table with both legs well-padded and placed into the traction boots.  Preoperative antibiotics were administered.  The left hip was then prepped and draped in the usual sterile fashion.  A timeout was taken to ensure the appropriate patient, procedure, and procedural site.  All were in agreement.  I made a 10 cm longitudinal incision beginning 2 cm distal and lateral to the ASIS and extending toward the fibular head.  Sharp dissection was carried down through the skin and subcutaneous tissues.  Hemostasis was obtained with Bovie electrocautery.  The fascia over the TFL was identified.  This was split sharply in line with the incision.  The fascia was elevated.  The TFL was mobilized.  A Cobra retractor was placed over the superior aspect of the femoral neck.  A Thakur elevator was used to develop the interval between the rectus and the anterior aspect of the femur.  A Cobra retractor was then placed over the inferior aspect of the femoral neck.  At the distal aspect of the interval I identified the circumflex femoral vessels.  These were cauterized.  I then performed a T-shaped capsulotomy. Fracture hematoma was encountered and evacuated. The anterior and posterior limbs of the capsule were tagged with nonabsorbable sutures.  The Cobra retractors were placed intracapsular. The fracture site was identified. Traction was applied to  the leg through the [place] table.  I utilized a corkscrew on power to remove the femoral head without complication. This was sized at a 47. The acetabulum was inspected. No bony fragments were identified. The acetabulum was irrigated. No significant arthritic changes were noted. The femoral hook for the Stryker table attachment was then inserted.  All traction was removed from the leg.  The leg was dropped into extension, external rotation, and adduction.  I then performed a capsular release and released soft tissue over the greater trochanter including the piriformis.  I had adequate visualization of the proximal femur.  I used a rongeur to remove the femoral neck remnant and lateralize proximally.  I then identified the femoral canal utilizing the curved rasp.  I used this to lateralize as well.  I then began broaching.  I started with the starting broach and sequentially broached up to a size 1 for the Avenir cemented system which had appropriate purchase.  I calcar planed the neck at this level. Retractors were removed and the leg was brought back to neutral. C-arm was brought in to evaluate the femoral stem. This appeared appropriately positioned. No periprosthetic fractures were identified. The femoral hook was inserted. The leg was again dropped. The trial stem was removed. The proximal femur was thoroughly irrigated. The cement restrictor was passed distally to allow for 2 cm cement mantle. I then opened the size 1 Avenir cemented stem. Cement was mixed on the back table. I then cemented the size 1 Avenir stem in standard fashion without complication matching my previous version. The cement was allowed to fully harden. I inserted a -3.5 neck trial with a 47 mm head.  Retractors removed and the hip was reduced.  This was stable in extension and external rotation at 90 degrees.  C-arm fluoroscopy was brought in.  Femoral component sizing was appropriate and no periprosthetic fractures were noted.  I was satisfied  with the leg lengths radiographically.  I was satisfied with these trial components.  The hip was dislocated without complication.  The leg was repositioned and retractors were inserted. The trial head was removed. The trunnion and acetabulum were thoroughly irrigated.  The acetabulum was clear.  I impacted the size 47 mm bipolar head with a -3.5 neck without complication.   The hip was reduced.  The hip was again stable in extension and external rotation at 90 degrees.  Final images were obtained with C arm.  I was satisfied with component positioning.  No complications were noted.  The hip was thoroughly irrigated with Irrisept followed by normal saline. 2 g of topical tranexamic acid were applied. Adequate hemostasis was obtained.  The capsule was closed with the nonabsorbable tag sutures.  Fascia over the TFL was closed with 0 Vicryl in running fashion. The soft tissues were again thoroughly irrigated. Periarticular local anesthetic was injected.  Subcutaneous tissues were closed with 0 Vicryl and 2-0 Vicryl.  Skin was closed with skin staples.  A Prevena incisional wound VAC was applied.  The patient was carefully transported off the [place] table back onto her hospital bed.    Patient woke from anesthesia without complication and was transferred to the recovery room in stable condition.  All surgical counts were correct.    Assistant: Minnie Lopez  was responsible for performing the following activities: Retraction, Suction, Irrigation, Suturing, Closing, and Placing Dressing and their skilled assistance was necessary for the success of this case.    Tree Reese MD     Date: 4/10/2025  Time: 19:33 EDT

## 2025-04-10 NOTE — PROGRESS NOTES
Enter Query Response Below      Query Response: Hyponatremia clinically insignificant             If applicable, please update the problem list.

## 2025-04-10 NOTE — PLAN OF CARE
Goal Outcome Evaluation:  Plan of Care Reviewed With: patient        Progress: no change  Outcome Evaluation: Patient remains on room air. Wore CPAP 5cmH20 last night. No issues. Will continue as needed.

## 2025-04-10 NOTE — THERAPY EVALUATION
RT EQUIPMENT DEVICE RELATED - SKIN ASSESSMENT    RT Medical Equipment/Device:     NIV Mask:  Under-the-nose   size:  B    Skin Assessment:      Cheek:  Intact  Nose:  Intact    Device Skin Pressure Protection:  Pressure points protected    Nurse Notification:  Amberly Batista, RRT

## 2025-04-10 NOTE — PROGRESS NOTES
RT EQUIPMENT DEVICE RELATED - SKIN ASSESSMENT    RT Medical Equipment/Device:     NIV Mask:  Under-the-nose   size:  B    Skin Assessment:      Cheek:  Intact  Neck:  Intact  Nose:  Intact  Mouth:  Intact    Device Skin Pressure Protection:  Positioning supports utilized and Skin-to-device areas padded:  None Required    Nurse Notification:  Amberly Rodriguez, RRT
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
## 2025-04-11 LAB
ANION GAP SERPL CALCULATED.3IONS-SCNC: 11.4 MMOL/L (ref 5–15)
BACTERIA SPEC AEROBE CULT: NO GROWTH
BUN SERPL-MCNC: 23 MG/DL (ref 8–23)
BUN/CREAT SERPL: 16.5 (ref 7–25)
CALCIUM SPEC-SCNC: 8.8 MG/DL (ref 8.6–10.5)
CHLORIDE SERPL-SCNC: 97 MMOL/L (ref 98–107)
CO2 SERPL-SCNC: 25.6 MMOL/L (ref 22–29)
CREAT SERPL-MCNC: 1.39 MG/DL (ref 0.57–1)
EGFRCR SERPLBLD CKD-EPI 2021: 39.2 ML/MIN/1.73
GLUCOSE SERPL-MCNC: 151 MG/DL (ref 65–99)
HCT VFR BLD AUTO: 35.5 % (ref 34–46.6)
HGB BLD-MCNC: 11.7 G/DL (ref 12–15.9)
POTASSIUM SERPL-SCNC: 3.9 MMOL/L (ref 3.5–5.2)
SODIUM SERPL-SCNC: 134 MMOL/L (ref 136–145)

## 2025-04-11 PROCEDURE — 25010000002 ENOXAPARIN PER 10 MG: Performed by: STUDENT IN AN ORGANIZED HEALTH CARE EDUCATION/TRAINING PROGRAM

## 2025-04-11 PROCEDURE — 25010000002 CEFAZOLIN PER 500 MG: Performed by: STUDENT IN AN ORGANIZED HEALTH CARE EDUCATION/TRAINING PROGRAM

## 2025-04-11 PROCEDURE — 80048 BASIC METABOLIC PNL TOTAL CA: CPT | Performed by: STUDENT IN AN ORGANIZED HEALTH CARE EDUCATION/TRAINING PROGRAM

## 2025-04-11 PROCEDURE — 85018 HEMOGLOBIN: CPT | Performed by: STUDENT IN AN ORGANIZED HEALTH CARE EDUCATION/TRAINING PROGRAM

## 2025-04-11 PROCEDURE — 97535 SELF CARE MNGMENT TRAINING: CPT

## 2025-04-11 PROCEDURE — 99232 SBSQ HOSP IP/OBS MODERATE 35: CPT | Performed by: FAMILY MEDICINE

## 2025-04-11 PROCEDURE — 97165 OT EVAL LOW COMPLEX 30 MIN: CPT

## 2025-04-11 PROCEDURE — 94660 CPAP INITIATION&MGMT: CPT

## 2025-04-11 PROCEDURE — 97116 GAIT TRAINING THERAPY: CPT

## 2025-04-11 PROCEDURE — 85014 HEMATOCRIT: CPT | Performed by: STUDENT IN AN ORGANIZED HEALTH CARE EDUCATION/TRAINING PROGRAM

## 2025-04-11 PROCEDURE — 94799 UNLISTED PULMONARY SVC/PX: CPT

## 2025-04-11 PROCEDURE — 99024 POSTOP FOLLOW-UP VISIT: CPT | Performed by: STUDENT IN AN ORGANIZED HEALTH CARE EDUCATION/TRAINING PROGRAM

## 2025-04-11 PROCEDURE — 94761 N-INVAS EAR/PLS OXIMETRY MLT: CPT

## 2025-04-11 PROCEDURE — 97530 THERAPEUTIC ACTIVITIES: CPT

## 2025-04-11 PROCEDURE — 97150 GROUP THERAPEUTIC PROCEDURES: CPT

## 2025-04-11 PROCEDURE — 97161 PT EVAL LOW COMPLEX 20 MIN: CPT

## 2025-04-11 RX ORDER — HYDROCODONE BITARTRATE AND ACETAMINOPHEN 5; 325 MG/1; MG/1
1 TABLET ORAL EVERY 4 HOURS PRN
Qty: 30 TABLET | Refills: 0 | Status: SHIPPED | OUTPATIENT
Start: 2025-04-11 | End: 2025-04-12 | Stop reason: HOSPADM

## 2025-04-11 RX ORDER — ENOXAPARIN SODIUM 100 MG/ML
40 INJECTION SUBCUTANEOUS EVERY 24 HOURS
Qty: 5.6 ML | Refills: 0 | Status: SHIPPED | OUTPATIENT
Start: 2025-04-12 | End: 2025-04-26

## 2025-04-11 RX ORDER — DULOXETIN HYDROCHLORIDE 30 MG/1
30 CAPSULE, DELAYED RELEASE ORAL NIGHTLY
Status: DISCONTINUED | OUTPATIENT
Start: 2025-04-11 | End: 2025-04-12 | Stop reason: HOSPADM

## 2025-04-11 RX ORDER — TRAMADOL HYDROCHLORIDE 50 MG/1
50 TABLET ORAL EVERY 6 HOURS PRN
Status: DISCONTINUED | OUTPATIENT
Start: 2025-04-11 | End: 2025-04-12 | Stop reason: HOSPADM

## 2025-04-11 RX ORDER — ROSUVASTATIN CALCIUM 5 MG/1
10 TABLET, COATED ORAL NIGHTLY
Status: DISCONTINUED | OUTPATIENT
Start: 2025-04-11 | End: 2025-04-12 | Stop reason: HOSPADM

## 2025-04-11 RX ADMIN — ENOXAPARIN SODIUM 40 MG: 100 INJECTION SUBCUTANEOUS at 00:37

## 2025-04-11 RX ADMIN — SODIUM CHLORIDE 2000 MG: 9 INJECTION, SOLUTION INTRAVENOUS at 09:25

## 2025-04-11 RX ADMIN — FAMOTIDINE 40 MG: 20 TABLET, FILM COATED ORAL at 09:25

## 2025-04-11 RX ADMIN — Medication 10 ML: at 21:15

## 2025-04-11 RX ADMIN — TRAMADOL HYDROCHLORIDE 50 MG: 50 TABLET, COATED ORAL at 20:45

## 2025-04-11 RX ADMIN — SODIUM CHLORIDE 2000 MG: 9 INJECTION, SOLUTION INTRAVENOUS at 00:37

## 2025-04-11 RX ADMIN — SENNOSIDES AND DOCUSATE SODIUM 2 TABLET: 50; 8.6 TABLET ORAL at 09:25

## 2025-04-11 RX ADMIN — DULOXETINE HYDROCHLORIDE 30 MG: 30 CAPSULE, DELAYED RELEASE ORAL at 20:45

## 2025-04-11 RX ADMIN — Medication 10 ML: at 11:42

## 2025-04-11 RX ADMIN — FERROUS SULFATE TAB 325 MG (65 MG ELEMENTAL FE) 325 MG: 325 (65 FE) TAB at 09:25

## 2025-04-11 RX ADMIN — ROSUVASTATIN CALCIUM 10 MG: 5 TABLET, FILM COATED ORAL at 20:45

## 2025-04-11 RX ADMIN — ENOXAPARIN SODIUM 40 MG: 100 INJECTION SUBCUTANEOUS at 23:24

## 2025-04-11 RX ADMIN — ACETAMINOPHEN 325 MG: 325 TABLET ORAL at 11:49

## 2025-04-11 RX ADMIN — ACETAMINOPHEN 1000 MG: 500 TABLET ORAL at 06:25

## 2025-04-11 NOTE — PLAN OF CARE
Goal Outcome Evaluation:  Plan of Care Reviewed With: patient        Progress: no change  Outcome Evaluation: Patient presents with limitations in self-care, functional transfers, balance, and endurance. She would benefit from continued skilled occupational therapy services to maximize independence with ADLs/functional transfers.    Anticipated Discharge Disposition (OT): inpatient rehabilitation facility

## 2025-04-11 NOTE — PLAN OF CARE
Goal Outcome Evaluation:  Plan of Care Reviewed With: patient           Outcome Evaluation: Patient on 3l nasal cannula.  Cpap is at bedside on standby settings: Cpap 5, 30%.
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
## 2025-04-11 NOTE — PROGRESS NOTES
[facility]   Hospitalist Progress Note  Date: 2025  Patient Name: Cindy Rosa  : 1945  MRN: [id number]  Date of admission: 2025      Subjective   Subjective     Chief Complaint: Follow-up left hip pain    Summary:Cindy Rosa is a 79 y.o. female past medical history of obesity BMI of 40, hypertension, dyslipidemia, GERD, breast cancer, and obstructive sleep apnea on CPAP who presents to the emergency department for a fall from standing with resultant left hip pain. In the emergency department the patient vital signs were as follows: Temperature 97.9, pulse 81, respiratory is 20, blood pressure 135/60, 90% room air.  CBC shows white count of 12,000.  CMP shows a sodium 132 and a potassium of 3.1.  Chest x-ray shows no acute abnormality.  Hip x-ray shows acutely displaced fracture left femoral neck.  Orthopedics was consulted.  Patient admitted to hospital for management of acute hip fracture.  Taken for left hip hemiarthroplasty.  Patient tolerated the procedure well.  Working well with physical therapy occupational therapy recommending inpatient rehab.  Discharge planning working on placement at [facility] in [place].    Interval Followup: Patient lying in bed appears to be resting fairly comfortably.  Patient did well with therapy today eager to get to rehab states she is a little bit sore after Afrin therapy.  Patient states pain fairly well-controlled with oral analgesics.  Patient denies any new issues.  Afebrile overnight.  Heart rate slightly elevated.  Blood pressure within normal limits.  Satting well on room air.  Serum creatinine trending up.  Hemoglobin trend down slightly.  No other issues per nursing.    Review of Systems  Constitutional: Negative for fatigue and fever.   HENT: Negative for sore throat and trouble swallowing.    Eyes: Negative for pain and discharge.   Respiratory: Negative for cough and shortness of breath.    Cardiovascular: Negative for chest pain and  palpitations.   Gastrointestinal: Negative for abdominal pain, nausea and vomiting.   Endocrine: Negative for cold intolerance and heat intolerance.   Genitourinary: Negative for difficulty urinating and dysuria.   Musculoskeletal: Negative for back pain and neck stiffness.  Left hip pain  Skin: Negative for color change and rash.   Neurological: Negative for syncope and headaches.   Hematological: Negative for adenopathy.   Psychiatric/Behavioral: Negative for confusion and hallucinations.    Objective   Objective     Vitals:   Temp:  [97.5 °F (36.4 °C)-99.7 °F (37.6 °C)] 99.7 °F (37.6 °C)  Heart Rate:  [] 103  Resp:  [11-20] 16  BP: (105-144)/(47-75) 140/55  Flow (L/min) (Oxygen Therapy):  [2-3] 3  FiO2 (%):  [70 %-100 %] 100 %  Physical Exam   General: well-developed appearing stated age in no acute distress  HEENT: Normocephalic atraumatic moist membranes pupils equal round reactive light, no scleral icterus no conjunctival injection  Cardiovascular: regular rate and rhythm no murmurs rubs or gallops S1-S2, no lower extremity edema appreciated  Pulmonary: Clear to auscultation bilaterally no wheezes rales or rhonchi symmetric chest expansion, unlabored, no conversational dyspnea appreciated  Gastrointestinal: Soft nontender nondistended positive bowel sounds all 4 quadrants no rebound or guarding  Musculoskeletal: No clubbing cyanosis, warm and well-perfused, calves soft symmetric nontender bilaterally, surgical dressing clean dry intact over the left hip  Skin: Clean dry without rashes  Neuro: Cranial nerves II through XII intact grossly no sensorimotor deficits appreciated bilateral upper and lower extremities  Psych: Patient is calm cooperative and appropriate with exam not responding to internal stimuli  : No Garza catheter no bladder distention no suprapubic tenderness    Result Review    Result Review:  I have personally reviewed these results and agree with these findings:  [x]  Laboratory  LAB  RESULTS:      Lab 04/11/25  0415 04/10/25  0457 04/09/25  1337   WBC  --  10.41 12.77*   HEMOGLOBIN 11.7* 12.9 13.0   HEMATOCRIT 35.5 37.8 37.5   PLATELETS  --  275 266   NEUTROS ABS  --  7.03* 10.10*   IMMATURE GRANS (ABS)  --  0.02 0.10*   LYMPHS ABS  --  2.48 1.75   MONOS ABS  --  0.58 0.58   EOS ABS  --  0.25 0.20   MCV  --  85.3 85.4   PROTIME  --   --  12.9         Lab 04/11/25  0415 04/10/25  0457 04/09/25  1337   SODIUM 134* 135* 132*   POTASSIUM 3.9 3.1* 3.1*   CHLORIDE 97* 96* 94*   CO2 25.6 25.9 27.1   ANION GAP 11.4 13.1 10.9   BUN 23 16 13   CREATININE 1.39* 1.07* 0.99   EGFR 39.2* 53.6* 58.8*   GLUCOSE 151* 116* 139*   CALCIUM 8.8 9.0 9.4   MAGNESIUM  --  1.7  --          Lab 04/10/25  0457 04/09/25  1337   TOTAL PROTEIN 6.7 6.9   ALBUMIN 3.8 3.8   GLOBULIN 2.9 3.1   ALT (SGPT) 16 19   AST (SGOT) 21 27   BILIRUBIN 0.7 0.8   ALK PHOS 107 115         Lab 04/09/25  1337   PROTIME 12.9   INR 0.94                 Brief Urine Lab Results  (Last result in the past 365 days)        Color   Clarity   Blood   Leuk Est   Nitrite   Protein   CREAT   Urine HCG        04/09/25 1433 Yellow   Clear   Negative   Negative   Negative   Negative                 Microbiology Results (last 10 days)       Procedure Component Value - Date/Time    Urine Culture - Urine, Indwelling Urethral Catheter [[id number]]  (Normal) Collected: 04/09/25 1433    Lab Status: Final result Specimen: Urine from Indwelling Urethral Catheter Updated: 04/11/25 0308     Urine Culture No growth            [x]  Microbiology  [x]  Radiology  XR Hip With or Without Pelvis 2 - 3 View Left  Result Date: 4/10/2025  Impression: Expected postoperative appearance of left total hip arthroplasty. Electronically Signed: Micheal Glover MD  4/10/2025 8:22 PM EDT  Workstation ID: [id number]    XR Hip With or Without Pelvis 2 - 3 View Left  Result Date: 4/9/2025  Impression: Acute displaced fracture of the left femoral neck. Electronically Signed: Sabra Mccloud   4/9/2025 1:48 PM EDT  Workstation ID: [id number]    XR Chest 1 View  Result Date: 4/9/2025  Impression: No acute cardiopulmonary abnormality is identified. Electronically Signed: Sabra Mccloud  4/9/2025 1:45 PM EDT  Workstation ID: [id number]      []  EKG/Telemetry   []  Cardiology/Vascular   []  Pathology  []  Old records  [x]  Other:  Scheduled Meds:acetaminophen, 1,000 mg, Oral, Q8H  DULoxetine, 30 mg, Oral, Nightly  enoxaparin sodium, 40 mg, Subcutaneous, Q24H  famotidine, 40 mg, Oral, Daily  ferrous sulfate, 325 mg, Oral, Daily With Breakfast  polyethylene glycol, 17 g, Oral, Daily  rosuvastatin, 10 mg, Oral, Nightly  senna-docusate sodium, 2 tablet, Oral, BID  sodium chloride, 1,000 mL, Intravenous, Once  sodium chloride, 10 mL, Intravenous, Q12H  sodium chloride, 10 mL, Intravenous, Q12H      Continuous Infusions:   PRN Meds:.  acetaminophen **OR** acetaminophen **OR** acetaminophen    acetaminophen    bisacodyl    HYDROcodone-acetaminophen    magnesium hydroxide    melatonin    Morphine **AND** naloxone    ondansetron ODT **OR** ondansetron    promethazine **OR** promethazine    sodium chloride    sodium chloride    sodium chloride    sodium chloride    traMADol      Assessment & Plan   Assessment / Plan     Assessment/Plan:  Left hip fracture now status post left hip hemiarthroplasty  Obesity BMI of 40  Obstructive sleep apnea on CPAP  History of hypertension  Hypokalemia  Mild hyponatremia        Patient admitted for further evaluation and treatment  Orthopedics consulted thank you for assistance  Continue pain control with oral analgesics IV for breakthrough  Continue to encourage early activity  Continue to encourage incentive spirometry  Physical therapy occupational therapy consulted to evaluate and treat  Dressing changes per orthopedics  Continue CPAP at night and as needed  Hold home aspirin while on Lovenox  Hold home amlodipine metoprolol and hydrochlorothiazide restart as needed  Continue home  rosuvastatin  Continue home duloxetine  Further inpatient orders and recommendations pending clinical course       Discussed plan with bedside RN.    Disposition: Inpatient rehab.  [facility] pre-CERT pending.    VTE Prophylaxis:  Pharmacologic & mechanical VTE prophylaxis orders are present.        CODE STATUS:   Code Status (Patient has no pulse and is not breathing): CPR (Attempt to Resuscitate)  Medical Interventions (Patient has pulse or is breathing): Full Support  Level Of Support Discussed With: Patient

## 2025-04-11 NOTE — THERAPY EVALUATION
RT EQUIPMENT DEVICE RELATED - SKIN ASSESSMENT    RT Medical Equipment/Device:     NIV Mask:  Under-the-nose   size:  b    Skin Assessment:      Cheek:  Intact  Chin:  Intact  Nares:  Intact  Neck:  Intact  Mouth:  Intact    Device Skin Pressure Protection:  Pressure points protected    Nurse Notification:  Amberly Dominguez, RRT

## 2025-04-11 NOTE — PLAN OF CARE
Goal Outcome Evaluation:  Plan of Care Reviewed With: patient           Outcome Evaluation: Pt is A&O X 4, on room air, and X 1 assist with walker and gait belt. All scheduled medications given and pain managed with PRN medications. VSS. Safety checks maintained throughout shift with pt resting in bed, bed in lowest position, wheels to bed locked, and personal items and call light within reach.

## 2025-04-11 NOTE — ANESTHESIA POSTPROCEDURE EVALUATION
Patient: Cindy Rosa    Procedure Summary       Date: 04/10/25 Room / Location: [facility] OR  / [facility] MAIN OR    Anesthesia Start: 1705 Anesthesia Stop: 1941    Procedure: Left total hip arthroplasty, possible hemiarthroplasty: Surgery to replace the broken left hip (Left: Hip) Diagnosis:       Left displaced femoral neck fracture      (Left displaced femoral neck fracture [S72.002A])    Surgeons: Tree Reese MD Provider: eTddy Maldonado MD    Anesthesia Type: general ASA Status: 3            Anesthesia Type: general    Vitals  Vitals Value Taken Time   /58 04/10/25 20:21   Temp 36.7 °C (98.1 °F) 04/10/25 19:40   Pulse 91 04/10/25 20:25   Resp 13 04/10/25 20:15   SpO2 91 % 04/10/25 20:25   Vitals shown include unfiled device data.        Post Anesthesia Care and Evaluation    Patient location during evaluation: bedside  Patient participation: complete - patient participated  Level of consciousness: awake  Pain score: 2  Pain management: adequate  Reason for not using neuraxial anesthesia or a peripheral nerve block: regional anesthesia not attempted: Potential Difficulty Predicted  Airway patency: patent  Anesthetic complications: No anesthetic complications  PONV Status: none  Cardiovascular status: acceptable and stable  Respiratory status: acceptable  Hydration status: acceptable
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
## 2025-04-11 NOTE — PLAN OF CARE
Goal Outcome Evaluation:  Plan of Care Reviewed With: patient        Progress: improving  Outcome Evaluation: left hip with wound vac in place, WDL, scheduled pain meds given, slept with cpap on
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
## 2025-04-11 NOTE — PROGRESS NOTES
[facility]     Progress Note    Patient Name: Cindy Rosa  : 1945  MRN: [id number]  Primary Care Physician:  Anabel Rai APRN  Date of admission: 2025    Subjective   Subjective     HPI:  No events overnight.  Pain well-controlled.  Denies chest pain or shortness of breath currently.    Review of Systems   See HPI    Objective   Objective     Vitals:   Temp:  [97.5 °F (36.4 °C)-99 °F (37.2 °C)] 97.7 °F (36.5 °C)  Heart Rate:  [68-93] 83  Resp:  [11-18] 16  BP: (105-144)/(47-69) 105/54  Flow (L/min) (Oxygen Therapy):  [2-3] 3  FiO2 (%):  [70 %-100 %] 100 %  Physical Exam    General: Alert, no acute distress   Left lower extremity: Prevena intact.  No drainage.  Swelling is mild.  Calf nontender.  No pain with logroll.  Distal paresthesias from neuropathy.  Palpable pedal pulse.    Result Review      WBC   Date Value Ref Range Status   04/10/2025 10.41 3.40 - 10.80 10*3/mm3 Final     RBC   Date Value Ref Range Status   04/10/2025 4.43 3.77 - 5.28 10*6/mm3 Final     Hemoglobin   Date Value Ref Range Status   2025 11.7 (L) 12.0 - 15.9 g/dL Final     Hematocrit   Date Value Ref Range Status   2025 35.5 34.0 - 46.6 % Final     MCV   Date Value Ref Range Status   04/10/2025 85.3 79.0 - 97.0 fL Final     MCH   Date Value Ref Range Status   04/10/2025 29.1 26.6 - 33.0 pg Final     MCHC   Date Value Ref Range Status   04/10/2025 34.1 31.5 - 35.7 g/dL Final     RDW   Date Value Ref Range Status   04/10/2025 13.3 12.3 - 15.4 % Final     RDW-SD   Date Value Ref Range Status   04/10/2025 41.4 37.0 - 54.0 fl Final     MPV   Date Value Ref Range Status   04/10/2025 9.0 6.0 - 12.0 fL Final     Platelets   Date Value Ref Range Status   04/10/2025 275 140 - 450 10*3/mm3 Final     Neutrophil %   Date Value Ref Range Status   04/10/2025 67.5 42.7 - 76.0 % Final     Lymphocyte %   Date Value Ref Range Status   04/10/2025 23.8 19.6 - 45.3 % Final     Monocyte %   Date Value Ref Range Status   04/10/2025  5.6 5.0 - 12.0 % Final     Eosinophil %   Date Value Ref Range Status   04/10/2025 2.4 0.3 - 6.2 % Final     Basophil %   Date Value Ref Range Status   04/10/2025 0.5 0.0 - 1.5 % Final     Immature Grans %   Date Value Ref Range Status   04/10/2025 0.2 0.0 - 0.5 % Final     Neutrophils, Absolute   Date Value Ref Range Status   04/10/2025 7.03 (H) 1.70 - 7.00 10*3/mm3 Final     Lymphocytes, Absolute   Date Value Ref Range Status   04/10/2025 2.48 0.70 - 3.10 10*3/mm3 Final     Monocytes, Absolute   Date Value Ref Range Status   04/10/2025 0.58 0.10 - 0.90 10*3/mm3 Final     Eosinophils, Absolute   Date Value Ref Range Status   04/10/2025 0.25 0.00 - 0.40 10*3/mm3 Final     Basophils, Absolute   Date Value Ref Range Status   04/10/2025 0.05 0.00 - 0.20 10*3/mm3 Final     Immature Grans, Absolute   Date Value Ref Range Status   04/10/2025 0.02 0.00 - 0.05 10*3/mm3 Final     nRBC   Date Value Ref Range Status   04/10/2025 0.0 0.0 - 0.2 /100 WBC Final        Result Review:  I have personally reviewed the results from the time of this admission to 4/11/2025 07:43 EDT and agree with these findings:  [x]  Laboratory  []  Microbiology  [x]  Radiology  []  EKG/Telemetry   []  Cardiology/Vascular   []  Pathology  []  Old records  []  Other:      Assessment & Plan   Assessment / Plan     Brief Patient Summary:  Cindy Rosa is a 79 y.o. female status post left hip hemiarthroplasty for femoral neck fracture on 4/10    Active Hospital Problems:  Active Hospital Problems    Diagnosis     **Left displaced femoral neck fracture     Hip fx      Plan:   X-ray reviewed: No hardware complications  Monitor hemoglobin  Pain control  Postoperative antibiotics  DVT prophylaxis  Weight-bear as tolerated  PT/OT  Continue Prevena x 7 days  Further care per primary team, appreciate assistance  Dispo: Anticipate rehab placement       VTE Prophylaxis:  Pharmacologic & mechanical VTE prophylaxis orders are present.        CODE STATUS:   Code  Status (Patient has no pulse and is not breathing): CPR (Attempt to Resuscitate)  Medical Interventions (Patient has pulse or is breathing): Full Support  Level Of Support Discussed With: Patient      Electronically signed by Tree Reese MD, 04/11/25, 7:43 AM EDT.

## 2025-04-11 NOTE — PROGRESS NOTES
RT EQUIPMENT DEVICE RELATED - SKIN ASSESSMENT    RT Medical Equipment/Device:     NIV Mask:  Under-the-nose   size:  B    Skin Assessment:      Cheek:  Intact  Neck:  Intact  Nose:  Intact  Mouth:  Intact    Device Skin Pressure Protection:  Pressure points protected    Nurse Notification:  Amberly aGlvez, RRT

## 2025-04-11 NOTE — PLAN OF CARE
Goal Outcome Evaluation:              Outcome Evaluation: Patient is resting comfortably on her CPAP. CPAP settings 5 and 30%. When not on CPAP patient is on 3LNC.

## 2025-04-11 NOTE — PLAN OF CARE
Goal Outcome Evaluation:  Plan of Care Reviewed With: patient           Outcome Evaluation: Pt presents with decreased ROM, strength, transfers and ambulation.  Skilled PT services will be required to address these mobility deficits.    Anticipated Discharge Disposition (PT): inpatient rehabilitation facility
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
## 2025-04-12 VITALS
TEMPERATURE: 98.7 F | DIASTOLIC BLOOD PRESSURE: 50 MMHG | HEART RATE: 102 BPM | RESPIRATION RATE: 18 BRPM | WEIGHT: 244.93 LBS | HEIGHT: 65 IN | SYSTOLIC BLOOD PRESSURE: 136 MMHG | OXYGEN SATURATION: 95 % | BODY MASS INDEX: 40.81 KG/M2

## 2025-04-12 LAB
ALBUMIN SERPL-MCNC: 3.1 G/DL (ref 3.5–5.2)
ANION GAP SERPL CALCULATED.3IONS-SCNC: 9.3 MMOL/L (ref 5–15)
BUN SERPL-MCNC: 27 MG/DL (ref 8–23)
BUN/CREAT SERPL: 25 (ref 7–25)
CALCIUM SPEC-SCNC: 8.3 MG/DL (ref 8.6–10.5)
CHLORIDE SERPL-SCNC: 98 MMOL/L (ref 98–107)
CO2 SERPL-SCNC: 25.7 MMOL/L (ref 22–29)
CREAT SERPL-MCNC: 1.08 MG/DL (ref 0.57–1)
DEPRECATED RDW RBC AUTO: 43.3 FL (ref 37–54)
EGFRCR SERPLBLD CKD-EPI 2021: 53 ML/MIN/1.73
ERYTHROCYTE [DISTWIDTH] IN BLOOD BY AUTOMATED COUNT: 13.5 % (ref 12.3–15.4)
FERRITIN SERPL-MCNC: 88.24 NG/ML (ref 13–150)
GLUCOSE SERPL-MCNC: 145 MG/DL (ref 65–99)
HCT VFR BLD AUTO: 28.1 % (ref 34–46.6)
HGB BLD-MCNC: 9.5 G/DL (ref 12–15.9)
IRON 24H UR-MRATE: 11 MCG/DL (ref 37–145)
IRON SATN MFR SERPL: 4 % (ref 20–50)
MCH RBC QN AUTO: 29.6 PG (ref 26.6–33)
MCHC RBC AUTO-ENTMCNC: 33.8 G/DL (ref 31.5–35.7)
MCV RBC AUTO: 87.5 FL (ref 79–97)
PHOSPHATE SERPL-MCNC: 2.9 MG/DL (ref 2.5–4.5)
PLATELET # BLD AUTO: 184 10*3/MM3 (ref 140–450)
PMV BLD AUTO: 9.2 FL (ref 6–12)
POTASSIUM SERPL-SCNC: 3.1 MMOL/L (ref 3.5–5.2)
RBC # BLD AUTO: 3.21 10*6/MM3 (ref 3.77–5.28)
SODIUM SERPL-SCNC: 133 MMOL/L (ref 136–145)
TIBC SERPL-MCNC: 255 MCG/DL (ref 298–536)
TRANSFERRIN SERPL-MCNC: 171 MG/DL (ref 200–360)
WBC NRBC COR # BLD AUTO: 14.12 10*3/MM3 (ref 3.4–10.8)

## 2025-04-12 PROCEDURE — 94761 N-INVAS EAR/PLS OXIMETRY MLT: CPT

## 2025-04-12 PROCEDURE — 25010000002 NA FERRIC GLUC CPLX PER 12.5 MG: Performed by: FAMILY MEDICINE

## 2025-04-12 PROCEDURE — 25810000003 SODIUM CHLORIDE 0.9 % SOLUTION: Performed by: FAMILY MEDICINE

## 2025-04-12 PROCEDURE — 94660 CPAP INITIATION&MGMT: CPT

## 2025-04-12 PROCEDURE — 97150 GROUP THERAPEUTIC PROCEDURES: CPT

## 2025-04-12 PROCEDURE — 82728 ASSAY OF FERRITIN: CPT | Performed by: FAMILY MEDICINE

## 2025-04-12 PROCEDURE — 99024 POSTOP FOLLOW-UP VISIT: CPT | Performed by: STUDENT IN AN ORGANIZED HEALTH CARE EDUCATION/TRAINING PROGRAM

## 2025-04-12 PROCEDURE — 83540 ASSAY OF IRON: CPT | Performed by: FAMILY MEDICINE

## 2025-04-12 PROCEDURE — 94799 UNLISTED PULMONARY SVC/PX: CPT

## 2025-04-12 PROCEDURE — 80069 RENAL FUNCTION PANEL: CPT | Performed by: FAMILY MEDICINE

## 2025-04-12 PROCEDURE — 97116 GAIT TRAINING THERAPY: CPT

## 2025-04-12 PROCEDURE — 85027 COMPLETE CBC AUTOMATED: CPT | Performed by: FAMILY MEDICINE

## 2025-04-12 PROCEDURE — 99238 HOSP IP/OBS DSCHRG MGMT 30/<: CPT | Performed by: FAMILY MEDICINE

## 2025-04-12 PROCEDURE — 84466 ASSAY OF TRANSFERRIN: CPT | Performed by: FAMILY MEDICINE

## 2025-04-12 RX ORDER — POTASSIUM CHLORIDE 750 MG/1
30 CAPSULE, EXTENDED RELEASE ORAL
Status: DISCONTINUED | OUTPATIENT
Start: 2025-04-12 | End: 2025-04-12 | Stop reason: HOSPADM

## 2025-04-12 RX ORDER — TRAMADOL HYDROCHLORIDE 50 MG/1
50 TABLET ORAL EVERY 6 HOURS PRN
Qty: 20 TABLET | Refills: 0 | Status: SHIPPED | OUTPATIENT
Start: 2025-04-12 | End: 2025-04-17

## 2025-04-12 RX ORDER — FERROUS SULFATE 325(65) MG
325 TABLET ORAL
Qty: 30 TABLET | Refills: 0 | Status: SHIPPED | OUTPATIENT
Start: 2025-04-13 | End: 2025-05-13

## 2025-04-12 RX ADMIN — FAMOTIDINE 40 MG: 20 TABLET, FILM COATED ORAL at 09:11

## 2025-04-12 RX ADMIN — Medication 10 ML: at 09:12

## 2025-04-12 RX ADMIN — FERROUS SULFATE TAB 325 MG (65 MG ELEMENTAL FE) 325 MG: 325 (65 FE) TAB at 09:11

## 2025-04-12 RX ADMIN — POTASSIUM CHLORIDE 30 MEQ: 750 CAPSULE, EXTENDED RELEASE ORAL at 09:11

## 2025-04-12 RX ADMIN — ACETAMINOPHEN 1000 MG: 500 TABLET ORAL at 14:22

## 2025-04-12 RX ADMIN — POTASSIUM CHLORIDE 30 MEQ: 750 CAPSULE, EXTENDED RELEASE ORAL at 12:09

## 2025-04-12 RX ADMIN — ACETAMINOPHEN 1000 MG: 500 TABLET ORAL at 06:26

## 2025-04-12 RX ADMIN — SODIUM CHLORIDE 250 MG: 9 INJECTION, SOLUTION INTRAVENOUS at 12:09

## 2025-04-12 NOTE — DISCHARGE SUMMARY
[facility]         HOSPITALIST  DISCHARGE SUMMARY    Patient Name: Cindy Rosa  : 1945  MRN: [id number]    Date of Admission: 2025  Date of Discharge: 2025  Primary Care Physician: Anabel Rai APRN    Consults       Date and Time Order Name Status Description    2025  5:44 PM Inpatient Orthopedic Surgery Consult Completed     2025  2:25 PM Hospitalist (on-call MD unless specified)      2025  2:25 PM Orthopedics (on-call MD unless specified)              Active and Resolved Hospital Problems:  Left hip fracture now status post left hip hemiarthroplasty  Obesity BMI of 40  Obstructive sleep apnea on CPAP  History of hypertension  Hypokalemia  Mild hyponatremia  Iron deficiency anemia  Active Hospital Problems    Diagnosis POA    **Left displaced femoral neck fracture [S72.002A] Unknown    Hip fx [S72.009A] Yes      Resolved Hospital Problems   No resolved problems to display.       Hospital Course     Hospital Course:  Cindy Rosa is a 79 y.o. female past medical history of obesity BMI of 40, hypertension, dyslipidemia, GERD, breast cancer, and obstructive sleep apnea on CPAP who presents to the emergency department for a fall from standing with resultant left hip pain. In the emergency department the patient vital signs were as follows: Temperature 97.9, pulse 81, respiratory is 20, blood pressure 135/60, 90% room air.  CBC shows white count of 12,000.  CMP shows a sodium 132 and a potassium of 3.1.  Chest x-ray shows no acute abnormality.  Hip x-ray shows acutely displaced fracture left femoral neck.  Orthopedics was consulted.  Patient admitted to hospital for management of acute hip fracture.  Taken for left hip hemiarthroplasty.  Patient tolerated the procedure well.  Noted to have postop anemia with underlying iron deficiency.  No signs of active bleeding.  Given dose of IV iron and started on p.o. iron supplementation on discharge.  Pain controlled with  oral analgesics.  Working well with physical therapy occupational therapy recommending inpatient rehab.  Patient seen evaluated on day of discharge and thought stable for discharge to [facility] to follow-up with her primary care provider and orthopedics as an outpatient.        DISCHARGE Follow Up Recommendations for labs and diagnostics: Repeat CBC and renal panel on follow-up to reevaluate hypokalemia and anemia.      Day of Discharge     Vital Signs:  Temp:  [97.5 °F (36.4 °C)-99.7 °F (37.6 °C)] 97.8 °F (36.6 °C)  Heart Rate:  [] 92  Resp:  [16-18] 18  BP: (130-146)/(46-70) 134/46  Flow (L/min) (Oxygen Therapy):  [2] 2  Physical Exam:   General: well-developed appearing stated age in no acute distress  HEENT: Normocephalic atraumatic moist membranes pupils equal round reactive light, no scleral icterus no conjunctival injection  Cardiovascular: regular rate and rhythm no murmurs rubs or gallops S1-S2, no lower extremity edema appreciated  Pulmonary: Clear to auscultation bilaterally no wheezes rales or rhonchi symmetric chest expansion, unlabored, no conversational dyspnea appreciated  Gastrointestinal: Soft nontender nondistended positive bowel sounds all 4 quadrants no rebound or guarding  Musculoskeletal: No clubbing cyanosis, warm and well-perfused, calves soft symmetric nontender bilaterally, surgical dressing clean dry intact over the left hip  Skin: Clean dry without rashes  Neuro: Cranial nerves II through XII intact grossly no sensorimotor deficits appreciated bilateral upper and lower extremities  Psych: Patient is calm cooperative and appropriate with exam not responding to internal stimuli  : No Garza catheter no bladder distention no suprapubic tenderness       Discharge Details        Discharge Medications        PAUSE taking these medications        Instructions Start Date   amLODIPine 5 MG tablet  Wait to take this until your doctor or other care provider tells you to start  again.  Commonly known as: NORVASC   5 mg, Daily      hydroCHLOROthiazide 25 MG tablet  Wait to take this until your doctor or other care provider tells you to start again.   25 mg, Daily             New Medications        Instructions Start Date   enoxaparin sodium 40 MG/0.4ML solution prefilled syringe syringe  Commonly known as: LOVENOX   40 mg, Subcutaneous, Every 24 Hours      ferrous sulfate 325 (65 FE) MG tablet   325 mg, Oral, Daily With Breakfast   Start Date: April 13, 2025     naloxone 4 MG/0.1ML nasal spray  Commonly known as: NARCAN   Call 911. Don't prime. [place] in 1 nostril for overdose. Repeat in 2-3 minutes in other nostril if no or minimal breathing/responsiveness.      traMADol 50 MG tablet  Commonly known as: ULTRAM   50 mg, Oral, Every 6 Hours PRN             Continue These Medications        Instructions Start Date   acetaminophen 500 MG tablet  Commonly known as: TYLENOL   500 mg, Every 6 Hours PRN      Alpha-Lipoic Acid 600 MG capsule   1,200 mg, Nightly      aspirin 81 MG tablet   81 mg, Daily      B-12 2500 MCG sublingual tablet   2,500 mcg, Daily      calcium citrate-vitamin d 200-250 MG-UNIT tablet tablet  Commonly known as: CITRACAL   1 tablet, Daily      cholecalciferol 25 MCG (1000 UT) tablet  Commonly known as: VITAMIN D3   1,000 Units, Daily      DULoxetine 30 MG capsule  Commonly known as: CYMBALTA   30 mg, Nightly      ICAPS AREDS 2 PO   1 capsule, Daily      loperamide 2 MG capsule  Commonly known as: IMODIUM   2 mg, 4 Times Daily PRN      loratadine 10 MG tablet  Commonly known as: CLARITIN   10 mg, Daily      Melatonin 10 MG tablet   10 mg, Nightly PRN      metoprolol succinate XL 50 MG 24 hr tablet  Commonly known as: TOPROL-XL   Take 1.5 tablets by mouth Daily.      multivitamin with minerals tablet tablet   1 tablet, Daily      multivitamin with minerals tablet tablet   1 tablet, Nightly      omeprazole 20 MG capsule  Commonly known as: priLOSEC   20 mg, Daily       rosuvastatin 10 MG tablet  Commonly known as: CRESTOR   10 mg, Nightly               No Known Allergies    Discharge Disposition:  Rehab Facility or Unit (DC - External)    Diet:  Hospital:  Diet Order   Procedures    Diet: Regular/House; Fluid Consistency: Thin (IDDSI 0)       Discharge Activity:   Activity Instructions       Gradually Increase Activity Until at Pre-Hospitalization Level              CODE STATUS:  Code Status and Medical Interventions: CPR (Attempt to Resuscitate); Full Support   Ordered at: 04/09/25 1520     Code Status (Patient has no pulse and is not breathing):    CPR (Attempt to Resuscitate)     Medical Interventions (Patient has pulse or is breathing):    Full Support     Level Of Support Discussed With:    Patient         Future Appointments   Date Time Provider Department Center   10/7/2025 11:30 AM [place] [facility] [place]   2/11/2026  1:00 PM Nya Aguilera [place] [facility] [place]       Additional Instructions for the Follow-ups that You Need to Schedule       Discharge Follow-up with PCP   As directed       Currently Documented PCP:    Anabel Rai APRN    PCP Phone Number:    [phone]     Follow Up Details: HOSPITAL DISCHARGE FOLLOW UP AFTER DISCHARGE FROM REHAB FOLLOW UP POSTOP IRON DEF ANEMIA                Pertinent  and/or Most Recent Results     PROCEDURES:         Tree Reese MD   Physician  Orthopedics     Op Note     Signed     Date of Service: 04/10/25 1741  Creation Time: 04/10/25 1933  Case Time: Procedures: Surgeons:   04/10/25 1741 Left total hip arthroplasty, possible hemiarthroplasty: Surgery to replace the broken left hip    Tree Reese MD               Signed         TOTAL HIP ARTHROPLASTY ANTERIOR  Procedure Report     Patient Name:  Cindy Rosa  YOB: 1945     Date of Surgery:  4/10/2025     Indications: Cindy is a 79-year-old female with a left femoral neck fracture. We discussed treatment options including both  operative and nonoperative management. We specifically discussed left hip hemiarthroplasty. We discussed the primary benefits of the surgery including pain relief and improved hip function. We discussed surgical risks including bleeding, infection, damage to nerves and blood vessels, hardware complications, fracture, instability, loosening, leg length discrepancy, persistent pain, anesthesia risk including mortality, DVT/PE, and need for additional procedures. The patient elected to proceed with surgery and consent was obtained.     Pre-op Diagnosis:   Left displaced femoral neck fracture [S72.002A]       Post-Op Diagnosis Codes:     * Left displaced femoral neck fracture [S72.002A]     Procedure(s):  Left hip hemiarthroplasty  Staff:  Surgeon(s):  Tree Reese MD     Assistant: Minnie Lopez     Anesthesia: General     Estimated Blood Loss: 200ml     Implants:    Implant Name Type Inv. Item Serial No.  Lot No. LRB No. Used Action   SUT NONABS MAXBRAID NMBR5 K60 38IN WHT/HORACIO - [id number] Implant SUT NONABS MAXBRAID NMBR5 K60 38IN WHT/HORACIO   [employer] [id number] Left 2 Implanted   CMT BONE PALACOS R HI/VISC 1X40 - [id number] Implant CMT BONE PALACOS R HI/VISC 1X40   [facility] [id number] Left 1 Implanted   CMT BONE PALACOS R HI/VISC 1X40 - [id number] Implant CMT BONE PALACOS R HI/VISC 1X40   [facility] [id number] Left 1 Implanted   STEM FEM/HIP AVENIR CMT STD SZ1 - [id number] Implant STEM FEM/HIP AVENIR CMT STD SZ1   [employer] [id number] Left 1 Implanted   KT BONE BIO PREP 6EA C/S - [id number] Implant KT BONE BIO PREP 6EA C/S   [employer] [id number] Left 1 Implanted   CUP ACET RINGLOC BIPOL 28MM 47MM - [id number] Implant CUP ACET RINGLOC BIPOL 28MM 47MM   [employer] [employer] [id number] Left 1 Implanted   HD FEM/HIP UNIV COCR 12/14TPR 28MM MIN3.5 - [id number] Implant HD FEM/HIP UNIV COCR 12/14TPR 28MM MIN3.5   [employer] [id number] Left 1 Implanted         Specimen:          None         Findings:  Displaced left femoral neck fracture     Complications: None     Description of Procedure: The patient was met in the preoperative holding area and the operative extremity was marked.  The patient was transported to the operating room and general anesthesia was induced without complication.  The patient was then carefully transferred onto the [place] table with both legs well-padded and placed into the traction boots.  Preoperative antibiotics were administered.  The left hip was then prepped and draped in the usual sterile fashion.  A timeout was taken to ensure the appropriate patient, procedure, and procedural site.  All were in agreement.  I made a 10 cm longitudinal incision beginning 2 cm distal and lateral to the ASIS and extending toward the fibular head.  Sharp dissection was carried down through the skin and subcutaneous tissues.  Hemostasis was obtained with Bovie electrocautery.  The fascia over the TFL was identified.  This was split sharply in line with the incision.  The fascia was elevated.  The TFL was mobilized.  A Cobra retractor was placed over the superior aspect of the femoral neck.  A Thakur elevator was used to develop the interval between the rectus and the anterior aspect of the femur.  A Cobra retractor was then placed over the inferior aspect of the femoral neck.  At the distal aspect of the interval I identified the circumflex femoral vessels.  These were cauterized.  I then performed a T-shaped capsulotomy. Fracture hematoma was encountered and evacuated. The anterior and posterior limbs of the capsule were tagged with nonabsorbable sutures.  The Cobra retractors were placed intracapsular. The fracture site was identified. Traction was applied to the leg through the [place] table.  I utilized a corkscrew on power to remove the femoral head without complication. This was sized at a 47. The acetabulum was inspected. No bony fragments were identified. The acetabulum was irrigated. No significant  arthritic changes were noted. The femoral hook for the [place] table attachment was then inserted.  All traction was removed from the leg.  The leg was dropped into extension, external rotation, and adduction.  I then performed a capsular release and released soft tissue over the greater trochanter including the piriformis.  I had adequate visualization of the proximal femur.  I used a rongeur to remove the femoral neck remnant and lateralize proximally.  I then identified the femoral canal utilizing the curved rasp.  I used this to lateralize as well.  I then began broaching.  I started with the starting broach and sequentially broached up to a size 1 for the Avenir cemented system which had appropriate purchase.  I calcar planed the neck at this level. Retractors were removed and the leg was brought back to neutral. C-arm was brought in to evaluate the femoral stem. This appeared appropriately positioned. No periprosthetic fractures were identified. The femoral hook was inserted. The leg was again dropped. The trial stem was removed. The proximal femur was thoroughly irrigated. The cement restrictor was passed distally to allow for 2 cm cement mantle. I then opened the size 1 Avenir cemented stem. Cement was mixed on the back table. I then cemented the size 1 Avenir stem in standard fashion without complication matching my previous version. The cement was allowed to fully harden. I inserted a -3.5 neck trial with a 47 mm head.  Retractors removed and the hip was reduced.  This was stable in extension and external rotation at 90 degrees.  C-arm fluoroscopy was brought in.  Femoral component sizing was appropriate and no periprosthetic fractures were noted.  I was satisfied with the leg lengths radiographically.  I was satisfied with these trial components.  The hip was dislocated without complication.  The leg was repositioned and retractors were inserted. The trial head was removed. The trunnion and acetabulum were  thoroughly irrigated.  The acetabulum was clear.  I impacted the size 47 mm bipolar head with a -3.5 neck without complication.   The hip was reduced.  The hip was again stable in extension and external rotation at 90 degrees.  Final images were obtained with C arm.  I was satisfied with component positioning.  No complications were noted.  The hip was thoroughly irrigated with Irrisept followed by normal saline. 2 g of topical tranexamic acid were applied. Adequate hemostasis was obtained.  The capsule was closed with the nonabsorbable tag sutures.  Fascia over the TFL was closed with 0 Vicryl in running fashion. The soft tissues were again thoroughly irrigated. Periarticular local anesthetic was injected.  Subcutaneous tissues were closed with 0 Vicryl and 2-0 Vicryl.  Skin was closed with skin staples.  A Prevena incisional wound VAC was applied.  The patient was carefully transported off the [place] table back onto her hospital bed.    Patient woke from anesthesia without complication and was transferred to the recovery room in stable condition.  All surgical counts were correct.     Assistant: Minnie Lopez  was responsible for performing the following activities: Retraction, Suction, Irrigation, Suturing, Closing, and Placing Dressing and their skilled assistance was necessary for the success of this case.     Tree Reese MD      Date: 4/10/2025  Time: 19:33 EDT                         LAB RESULTS:      Lab 04/12/25  0435 04/11/25  0415 04/10/25  0457 04/09/25  1337   WBC 14.12*  --  10.41 12.77*   HEMOGLOBIN 9.5* 11.7* 12.9 13.0   HEMATOCRIT 28.1* 35.5 37.8 37.5   PLATELETS 184  --  275 266   NEUTROS ABS  --   --  7.03* 10.10*   IMMATURE GRANS (ABS)  --   --  0.02 0.10*   LYMPHS ABS  --   --  2.48 1.75   MONOS ABS  --   --  0.58 0.58   EOS ABS  --   --  0.25 0.20   MCV 87.5  --  85.3 85.4   PROTIME  --   --   --  12.9         Lab 04/12/25  0435 04/11/25  0415 04/10/25  0457 04/09/25  1337   SODIUM 133* 134*  135* 132*   POTASSIUM 3.1* 3.9 3.1* 3.1*   CHLORIDE 98 97* 96* 94*   CO2 25.7 25.6 25.9 27.1   ANION GAP 9.3 11.4 13.1 10.9   BUN 27* 23 16 13   CREATININE 1.08* 1.39* 1.07* 0.99   EGFR 53.0* 39.2* 53.6* 58.8*   GLUCOSE 145* 151* 116* 139*   CALCIUM 8.3* 8.8 9.0 9.4   MAGNESIUM  --   --  1.7  --    PHOSPHORUS 2.9  --   --   --          Lab 04/12/25  0435 04/10/25  0457 04/09/25  1337   TOTAL PROTEIN  --  6.7 6.9   ALBUMIN 3.1* 3.8 3.8   GLOBULIN  --  2.9 3.1   ALT (SGPT)  --  16 19   AST (SGOT)  --  21 27   BILIRUBIN  --  0.7 0.8   ALK PHOS  --  107 115         Lab 04/09/25  1337   PROTIME 12.9   INR 0.94             Lab 04/12/25  0435   IRON 11*   IRON SATURATION (TSAT) 4*   TIBC 255*   TRANSFERRIN 171*   FERRITIN 88.24         Brief Urine Lab Results  (Last result in the past 365 days)        Color   Clarity   Blood   Leuk Est   Nitrite   Protein   CREAT   Urine HCG        04/09/25 1433 Yellow   Clear   Negative   Negative   Negative   Negative                 Microbiology Results (last 10 days)       Procedure Component Value - Date/Time    Urine Culture - Urine, Indwelling Urethral Catheter [[id number]]  (Normal) Collected: 04/09/25 1433    Lab Status: Final result Specimen: Urine from Indwelling Urethral Catheter Updated: 04/11/25 0308     Urine Culture No growth            XR Hip With or Without Pelvis 2 - 3 View Left  Result Date: 4/10/2025  Impression: Impression: Expected postoperative appearance of left total hip arthroplasty. Electronically Signed: Micheal Glover MD  4/10/2025 8:22 PM EDT  Workstation ID: [id number]    XR Hip With or Without Pelvis 2 - 3 View Left  Result Date: 4/9/2025  Impression: Impression: Acute displaced fracture of the left femoral neck. Electronically Signed: Sabra Mccloud  4/9/2025 1:48 PM EDT  Workstation ID: [id number]    XR Chest 1 View  Result Date: 4/9/2025  Impression: Impression: No acute cardiopulmonary abnormality is identified. Electronically Signed: Sabra Mccloud  4/9/2025  1:45 PM EDT  Workstation ID: [id number]                  Labs Pending at Discharge:        Time spent on Discharge including face to face service: 25 minutes    Electronically signed by Emil Monge MD, 04/12/25, 11:52 AM EDT.
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
## 2025-04-12 NOTE — PROGRESS NOTES
RT EQUIPMENT DEVICE RELATED - SKIN ASSESSMENT    RT Medical Equipment/Device:     NIV Mask:  Under-the-nose   size:  B    Skin Assessment:      Cheek:  Intact  Ears:  Intact  Forehead:  Intact  Nose:  Intact  Lips:  Intact  Mouth:  Intact    Device Skin Pressure Protection:  Positioning supports utilized    Nurse Notification:  Amberly Saldana, RRT

## 2025-04-12 NOTE — PLAN OF CARE
Goal Outcome Evaluation:      Pt AO x4, x1 assist, voiding spontaneously, ambulated in hallway about 100 ft and tolerated well. Pt only wants Tylenol or Tramadol for pain if needed.Pt medicated with Tramadol x1 with relief. Pt waiting on rehab placement.

## 2025-04-12 NOTE — PLAN OF CARE
Goal Outcome Evaluation:  Plan of Care Reviewed With: patient        Progress: no change  Outcome Evaluation: Patient is currently on room air.  Patient has a CPAP of 5, with and UTN size B mask in her room.  Patient was able to wear and tolerate the CPAP for a few hours last night.  Per patient she wear a   home CPAP unit with nasal pillows so our mask and machine is different for her. Patient will continue to wear our mask and CPAP machine while here in the hospital.  Patient is going to see if she is able to get someone to bring her home unit in, but unsure at this time if she will be able to.  Informed patient that is ok and we will continue to keep our unit in the room for her to wear as needed and tolerated.

## 2025-04-12 NOTE — PLAN OF CARE
Goal Outcome Evaluation:   Patient on RA,   Patient has a CPAP of 5, with and UTN size B mask in her room.  Patient was able to wear and tolerate the CPAP for a few hours last night. She has a home unit that she says is more comfortable- may try and bring that in and if not she will continue to try using the inpatient unit

## 2025-04-12 NOTE — PROGRESS NOTES
RT EQUIPMENT DEVICE RELATED - SKIN ASSESSMENT    RT Medical Equipment/Device:     NIV Mask:  Under-the-nose   size:  b    Skin Assessment:      Cheek:  Intact  Chin:  Intact  Nose:  Intact  Mouth:  Intact    Device Skin Pressure Protection:  Skin-to-device areas padded:  None Required    Nurse Notification:  Amberly Huerta, CRT

## 2025-04-12 NOTE — PROGRESS NOTES
[facility]     Progress Note    Patient Name: Cindy Rosa  : 1945  MRN: [id number]  Primary Care Physician:  Anabel Rai APRN  Date of admission: 2025    Subjective   Subjective     HPI:  No new issues overnight.  Ambulatory in the cash with PT yesterday.  Denies chest pain or shortness of breath.  Awaiting rehab placement  Review of Systems   See HPI    Objective   Objective     Vitals:   Temp:  [97.5 °F (36.4 °C)-99.7 °F (37.6 °C)] 97.8 °F (36.6 °C)  Heart Rate:  [] 92  Resp:  [16-20] 18  BP: (120-146)/(46-75) 134/46  Flow (L/min) (Oxygen Therapy):  [2] 2  Physical Exam    General: Alert, no acute distress   Left lower extremity: Prevena intact with no drainage.  Swelling is mild.  Early bruising as expected.  Calf nontender.  No pain with logroll.  Distal paresthesias from neuropathy.  Palpable pedal pulse.    Result Review      WBC   Date Value Ref Range Status   2025 14.12 (H) 3.40 - 10.80 10*3/mm3 Final     RBC   Date Value Ref Range Status   2025 3.21 (L) 3.77 - 5.28 10*6/mm3 Final     Hemoglobin   Date Value Ref Range Status   2025 9.5 (L) 12.0 - 15.9 g/dL Final     Hematocrit   Date Value Ref Range Status   2025 28.1 (L) 34.0 - 46.6 % Final     MCV   Date Value Ref Range Status   2025 87.5 79.0 - 97.0 fL Final     MCH   Date Value Ref Range Status   2025 29.6 26.6 - 33.0 pg Final     MCHC   Date Value Ref Range Status   2025 33.8 31.5 - 35.7 g/dL Final     RDW   Date Value Ref Range Status   2025 13.5 12.3 - 15.4 % Final     RDW-SD   Date Value Ref Range Status   2025 43.3 37.0 - 54.0 fl Final     MPV   Date Value Ref Range Status   2025 9.2 6.0 - 12.0 fL Final     Platelets   Date Value Ref Range Status   2025 184 140 - 450 10*3/mm3 Final     Neutrophil %   Date Value Ref Range Status   04/10/2025 67.5 42.7 - 76.0 % Final     Lymphocyte %   Date Value Ref Range Status   04/10/2025 23.8 19.6 - 45.3 % Final      Monocyte %   Date Value Ref Range Status   04/10/2025 5.6 5.0 - 12.0 % Final     Eosinophil %   Date Value Ref Range Status   04/10/2025 2.4 0.3 - 6.2 % Final     Basophil %   Date Value Ref Range Status   04/10/2025 0.5 0.0 - 1.5 % Final     Immature Grans %   Date Value Ref Range Status   04/10/2025 0.2 0.0 - 0.5 % Final     Neutrophils, Absolute   Date Value Ref Range Status   04/10/2025 7.03 (H) 1.70 - 7.00 10*3/mm3 Final     Lymphocytes, Absolute   Date Value Ref Range Status   04/10/2025 2.48 0.70 - 3.10 10*3/mm3 Final     Monocytes, Absolute   Date Value Ref Range Status   04/10/2025 0.58 0.10 - 0.90 10*3/mm3 Final     Eosinophils, Absolute   Date Value Ref Range Status   04/10/2025 0.25 0.00 - 0.40 10*3/mm3 Final     Basophils, Absolute   Date Value Ref Range Status   04/10/2025 0.05 0.00 - 0.20 10*3/mm3 Final     Immature Grans, Absolute   Date Value Ref Range Status   04/10/2025 0.02 0.00 - 0.05 10*3/mm3 Final     nRBC   Date Value Ref Range Status   04/10/2025 0.0 0.0 - 0.2 /100 WBC Final        Result Review:  I have personally reviewed the results from the time of this admission to 4/12/2025 09:15 EDT and agree with these findings:  [x]  Laboratory  []  Microbiology  [x]  Radiology  []  EKG/Telemetry   []  Cardiology/Vascular   []  Pathology  []  Old records  []  Other:      Assessment & Plan   Assessment / Plan     Brief Patient Summary:  Cindy Rosa is a 79 y.o. female status post left hip hemiarthroplasty for femoral neck fracture on 4/10    Active Hospital Problems:  Active Hospital Problems    Diagnosis     **Left displaced femoral neck fracture     Hip fx      Plan:   Monitor hemoglobin  Pain control  DVT prophylaxis  Weight-bear as tolerated  PT/OT  Continue Prevena x 7 days  Further care per primary team, appreciate assistance  Dispo: Anticipate rehab placement, 2-week follow-up after discharge       VTE Prophylaxis:  Pharmacologic & mechanical VTE prophylaxis orders are  present.        CODE STATUS:   Code Status (Patient has no pulse and is not breathing): CPR (Attempt to Resuscitate)  Medical Interventions (Patient has pulse or is breathing): Full Support  Level Of Support Discussed With: Patient      Electronically signed by Tree Reese MD, 04/12/25, 9:16 AM EDT.

## 2025-04-21 ENCOUNTER — TELEPHONE (OUTPATIENT)
Dept: ORTHOPEDIC SURGERY | Facility: CLINIC | Age: 80
End: 2025-04-21
Payer: MEDICARE

## 2025-04-21 NOTE — TELEPHONE ENCOUNTER
Caller: ERIK WITH [facility]    Relationship: PROVIDER    Best call back number: [phone]    What orders are you requesting (i.e. lab or imaging): NURSING, PT, AND OT VERBAL ORDERS NEEDED- PLEASE CALL

## 2025-04-22 LAB
QT INTERVAL: 406 MS
QTC INTERVAL: 462 MS

## 2025-04-30 ENCOUNTER — TELEPHONE (OUTPATIENT)
Dept: ORTHOPEDIC SURGERY | Facility: CLINIC | Age: 80
End: 2025-04-30

## 2025-04-30 NOTE — TELEPHONE ENCOUNTER
Caller: [facility] - OCCUPATIONAL THERAPY    Relationship to patient:     Best call back number: [phone]    Patient is needing: PATIENT IS BEING DISCHARGED FROM OCCUPATIONAL THERAPY DUE TO ALL GOALS BEING MET

## 2025-05-02 ENCOUNTER — OFFICE VISIT (OUTPATIENT)
Dept: ORTHOPEDIC SURGERY | Facility: CLINIC | Age: 80
End: 2025-05-02
Payer: MEDICARE

## 2025-05-02 VITALS — HEART RATE: 78 BPM | BODY MASS INDEX: 40.65 KG/M2 | WEIGHT: 244 LBS | HEIGHT: 65 IN | OXYGEN SATURATION: 97 %

## 2025-05-02 DIAGNOSIS — S72.002A LEFT DISPLACED FEMORAL NECK FRACTURE: Primary | ICD-10-CM

## 2025-05-02 RX ORDER — CEPHALEXIN 500 MG/1
500 CAPSULE ORAL 4 TIMES DAILY
Qty: 40 CAPSULE | Refills: 0 | Status: SHIPPED | OUTPATIENT
Start: 2025-05-02

## 2025-05-02 NOTE — PROGRESS NOTES
"Chief Complaint  Follow-up and Pain of the Left Hip    Subjective          Cindy Rosa presents to [facility] for   History of Present Illness    Cindy returns today for follow-up of her left hip.  She is status post left hip hemiarthroplasty for femoral neck fracture on 4/10.  Her staples were removed today.  She is at home and doing home health PT.  She has minimal pain.  She is ambulating with a walker.  She feels she is doing well overall.    No Known Allergies     Social History     Socioeconomic History    Marital status:    Tobacco Use    Smoking status: Former     Current packs/day: 0.00     Average packs/day: 0.5 packs/day for 8.0 years (4.0 ttl pk-yrs)     Types: Cigarettes     Quit date:      Years since quittin.3     Passive exposure: Past    Smokeless tobacco: Never    Tobacco comments:     Stopped    Vaping Use    Vaping status: Never Used   Substance and Sexual Activity    Alcohol use: Yes     Alcohol/week: 2.0 standard drinks of alcohol     Types: 1 Glasses of wine, 1 Drinks containing 0.5 oz of alcohol per week     Comment: Occasional wine or coctail not weekly    Drug use: No    Sexual activity: Not Currently     Partners: Male     Comment: N/A        I reviewed the patient's chief complaint, history of present illness, review of systems, past medical history, surgical history, family history, social history, medications, and allergy list.     REVIEW OF SYSTEMS    Constitutional: Denies fevers, chills, weight loss  Cardiovascular: Denies chest pain, shortness of breath  Skin: Denies rashes, acute skin changes  Neurologic: Denies headache, loss of consciousness  MSK: Left hip pain      Objective   Vital Signs:   Pulse 78   Ht 165.1 cm (65\")   Wt 111 kg (244 lb)   SpO2 97%   BMI 40.60 kg/m²     Body mass index is 40.6 kg/m².    Physical Exam    General: Alert. No acute distress.   Left lower extremity: Staples have been removed.  There is skin " irritation from the staple insertion site.  No active drainage.  No surrounding cellulitis.  Mild swelling.  No pain with hip motion.  Intact hip flexion and abduction.  Distal neurovascular intact.  Calf nontender.    Procedures    Imaging Results (Most Recent)       Procedure Component Value Units Date/Time    XR Hip With or Without Pelvis 2 - 3 View Left [[id number]] Resulted: 05/02/25 1055     Updated: 05/02/25 1055    Narrative:      Indications: Follow-up left hip hemiarthroplasty    Views: AP and frog lateral left hip    Findings: Cemented left hip hemiarthroplasty implant in place.  No   periprosthetic fractures or signs of loosening.  Hip reduced.    Comparative Data: Comparative data found and reviewed today                     Assessment and Plan        XR Hip With or Without Pelvis 2 - 3 View Left  Result Date: 5/2/2025  Narrative: Indications: Follow-up left hip hemiarthroplasty Views: AP and frog lateral left hip Findings: Cemented left hip hemiarthroplasty implant in place.  No periprosthetic fractures or signs of loosening.  Hip reduced. Comparative Data: Comparative data found and reviewed today    XR Hip With or Without Pelvis 2 - 3 View Left  Result Date: 4/10/2025  Narrative: XR HIP W OR WO PELVIS 2-3 VIEW LEFT Date of Exam: 4/10/2025 8:02 PM EDT Indication: Post-Op Hip Arthroplasty Comparison: Radiographs of the left hip and pelvis performed on April 9, 2025 Findings: Interval left total hip arthroplasty. Hardware is intact and in appropriate position. No acute fractures visualized. Postsurgical subcutaneous air is visualized. Lateral skin staples are noted.     Impression: Impression: Expected postoperative appearance of left total hip arthroplasty. Electronically Signed: Micheal Glover MD  4/10/2025 8:22 PM EDT  Workstation ID: [id number]    [place] Surgery Fluoro  Result Date: 4/10/2025  Narrative: This procedure was auto-finalized with no dictation required.    XR Hip With or Without Pelvis 2 - 3  View Left  Result Date: 4/9/2025  Narrative: XR HIP W OR WO PELVIS 2-3 VIEW LEFT Date of Exam: 4/9/2025 1:13 PM EDT Indication: Injury, left hip pain, fall today Comparison: None available. Findings: Technologist noted difficulty positioning due to limited range of motion and patient condition. There is an acute fracture of the left femoral neck with approximately 2.8 cm superior displacement of the distal fracture fragment. There is arthritic narrowing of both hip joints.     Impression: Impression: Acute displaced fracture of the left femoral neck. Electronically Signed: Sabra Mccloud  4/9/2025 1:48 PM EDT  Workstation ID: [id number]    XR Chest 1 View  Result Date: 4/9/2025  Narrative: XR CHEST 1 VW Date of Exam: 4/9/2025 1:21 PM EDT Indication: Preop, left proximal femur fracture Comparison: AP chest x-ray 12/27/2016 Findings: Lungs are adequately expanded and appear clear. Cardiomediastinal contours are within normal limits.     Impression: Impression: No acute cardiopulmonary abnormality is identified. Electronically Signed: Sabra Mccloud  4/9/2025 1:45 PM EDT  Workstation ID: [id number]       Diagnoses and all orders for this visit:    1. Left displaced femoral neck fracture (Primary)  -     XR Hip With or Without Pelvis 2 - 3 View Left  -     cephalexin (KEFLEX) 500 MG capsule; Take 1 capsule by mouth 4 (Four) Times a Day.  Dispense: 40 capsule; Refill: 0        We reviewed her imaging.  She is doing very well overall.  We will add Keflex for her skin irritation around the incision.  We discussed daily dry dressing changes.  We discussed incision care.  We discussed concerning signs and symptoms.  Follow-up in 2 weeks for reevaluation and incision check.  No x-rays needed.  Continue home PT.        Call or return if worsening symptoms.    Scribed for Tree Reese MD by Tree Reese MD  05/02/2025   12:57 EDT         Follow Up       2 weeks    Patient was given instructions and counseling regarding her  condition or for health maintenance advice. Please see specific information pulled into the AVS if appropriate.       I have personally performed the services described in this document as scribed by the above individual and it is both accurate and complete. Tree Reese MD 05/02/25 12:59 EDT

## 2025-05-15 ENCOUNTER — OFFICE VISIT (OUTPATIENT)
Dept: ORTHOPEDIC SURGERY | Facility: CLINIC | Age: 80
End: 2025-05-15
Payer: MEDICARE

## 2025-05-15 VITALS — SYSTOLIC BLOOD PRESSURE: 151 MMHG | OXYGEN SATURATION: 93 % | DIASTOLIC BLOOD PRESSURE: 79 MMHG | HEART RATE: 79 BPM

## 2025-05-15 DIAGNOSIS — Z96.642 STATUS POST HEMIARTHROPLASTY OF LEFT HIP: Primary | ICD-10-CM

## 2025-05-15 NOTE — PROGRESS NOTES
Chief Complaint  Follow-up of the Left Hip    Subjective          Cindy Rosa presents to [facility]   History of Present Illness    Cindy Rosa presents today for a follow-up of her left hip.  Patient is 5 weeks postop left hip hemiarthroplasty for femoral neck fracture performed on 4/10/2025.  Today, patient states that she is doing okay.  She did complete her Keflex antibiotic course.  She states that she has had a small amount of drainage without any fevers or chills.  She is continue with her incision site care daily.  Reports no pain in her hip.      No Known Allergies     Social History     Socioeconomic History    Marital status:    Tobacco Use    Smoking status: Former     Current packs/day: 0.00     Average packs/day: 0.5 packs/day for 8.0 years (4.0 ttl pk-yrs)     Types: Cigarettes     Quit date:      Years since quittin.4     Passive exposure: Past    Smokeless tobacco: Never    Tobacco comments:     Stopped    Vaping Use    Vaping status: Never Used   Substance and Sexual Activity    Alcohol use: Yes     Alcohol/week: 2.0 standard drinks of alcohol     Types: 1 Glasses of wine, 1 Drinks containing 0.5 oz of alcohol per week     Comment: Occasional wine or coctail not weekly    Drug use: No    Sexual activity: Not Currently     Partners: Male     Comment: N/A        I reviewed the patient's chief complaint, history of present illness, review of systems, past medical history, surgical history, family history, social history, medications, and allergy list.     REVIEW OF SYSTEMS    Constitutional: Denies fevers, chills, weight loss  Cardiovascular: Denies chest pain, shortness of breath  Skin: Denies rashes, acute skin changes  Neurologic: Denies headache, loss of consciousness  MSK: Left hip pain      Objective   Vital Signs:   /79   Pulse 79   SpO2 93%     There is no height or weight on file to calculate BMI.    Physical Exam    General:  Alert. No acute distress.   Left lower extremity: Incision is healing appropriately with 2 areas of granulation tissue at the proximal portion of the incision, no active drainage, no surrounding erythema, no concerning signs for infection.  Hip flexion intact.  No pain with passive hip range of motion.  No pain passive logroll the hip.  Knee extensor mechanism intact.  Calf soft, nontender.  Demonstrates active ankle plantarflexion and dorsiflexion.  Sensation intact over dorsal and plantar foot.  Palpable pedal pulses.    Procedures    Imaging Results (Most Recent)       None                   Assessment and Plan    Diagnoses and all orders for this visit:    1. Status post hemiarthroplasty of left hip (Primary)        Cindy Rosa presents today 5 weeks postop left hip hemiarthroplasty performed on 4/10/2025.  Incision continues to heal appropriately with granulation tissue, no active drainage, no surrounding erythema, no concerning signs for infection.  Patient to continue with exercises for hip range of motion.  She will continue with incision site hygiene including daily dressing changes at this time.      Patient will follow up in 2 weeks for reevaluation.  We will obtain new x-rays of the left hip at next visit.      Call or return if symptoms worsen or patient has any concerns.       Follow Up   Return in about 2 weeks (around 5/29/2025).  Patient was given instructions and counseling regarding her condition or for health maintenance advice. Please see specific information pulled into the AVS if appropriate.     Krista Hernandez PA-C  05/19/25  07:35 EDT

## 2025-05-28 ENCOUNTER — OFFICE VISIT (OUTPATIENT)
Dept: ORTHOPEDIC SURGERY | Facility: CLINIC | Age: 80
End: 2025-05-28
Payer: MEDICARE

## 2025-05-28 VITALS — DIASTOLIC BLOOD PRESSURE: 77 MMHG | SYSTOLIC BLOOD PRESSURE: 154 MMHG | OXYGEN SATURATION: 93 % | HEART RATE: 75 BPM

## 2025-05-28 DIAGNOSIS — M25.552 LEFT HIP PAIN: ICD-10-CM

## 2025-05-28 DIAGNOSIS — Z96.642 STATUS POST HEMIARTHROPLASTY OF LEFT HIP: Primary | ICD-10-CM

## 2025-05-28 NOTE — PROGRESS NOTES
Chief Complaint  Follow-up of the Left Hip    Subjective          Cindy Rosa presents to [facility]   History of Present Illness    Cindy Rosa presents today for a follow-up of her left hip.  Patient is 7 weeks postop left hip hemiarthroplasty for femoral neck fracture performed on 4/10/2025.  Today, patient states that she is doing well.  She is still working with home physical therapy and states that she has about 1 or 2 weeks left.  She feels that she has good range of motion.  States that her strength is gradually improving and her stability is much better.  She explains that she is continue with incision site hygiene but has been applying A&E ointment at times.  Denies any drainage or redness from her incision.  Denies any fevers or chills.      No Known Allergies     Social History     Socioeconomic History    Marital status:    Tobacco Use    Smoking status: Former     Current packs/day: 0.00     Average packs/day: 0.5 packs/day for 8.0 years (4.0 ttl pk-yrs)     Types: Cigarettes     Quit date:      Years since quittin.4     Passive exposure: Past    Smokeless tobacco: Never    Tobacco comments:     Stopped    Vaping Use    Vaping status: Never Used   Substance and Sexual Activity    Alcohol use: Yes     Alcohol/week: 2.0 standard drinks of alcohol     Types: 1 Glasses of wine, 1 Drinks containing 0.5 oz of alcohol per week     Comment: Occasional wine or coctail not weekly    Drug use: No    Sexual activity: Not Currently     Partners: Male     Comment: N/A        I reviewed the patient's chief complaint, history of present illness, review of systems, past medical history, surgical history, family history, social history, medications, and allergy list.     REVIEW OF SYSTEMS    Constitutional: Denies fevers, chills, weight loss  Cardiovascular: Denies chest pain, shortness of breath  Skin: Denies rashes, acute skin changes  Neurologic: Denies headache,  loss of consciousness  MSK: Left hip pain      Objective   Vital Signs:   /77   Pulse 75   SpO2 93%     There is no height or weight on file to calculate BMI.    Physical Exam    General: Alert. No acute distress.   Left lower extremity: Incision continues to heal appropriately with 2 areas of granulation tissue at the proximal portion of the incision, no active drainage, no surrounding erythema, no concerning signs for infection.  The remainder the incision is well-healed.  Hip flexion intact.  5 out of 5 hip flexion.  5 out of 5 hip abduction.  No pain with passive hip range of motion.  No pain with passive logroll the hip.  Knee extensor mechanism intact.  Calf soft, nontender.  Demonstrates active ankle plantarflexion and dorsiflexion.  Sensation intact over dorsal and plantar foot.  Palpable pedal pulses.    Procedures    Imaging Results (Most Recent)       Procedure Component Value Units Date/Time    XR Hip With or Without Pelvis 2 - 3 View Left [[id number]] Resulted: 05/28/25 1408     Updated: 05/28/25 1408    Narrative:      Indications: Follow-up left hip hemiarthroplasty    Views: AP and frog lateral left hip    Findings: Left hip hemiarthroplasty implants in place with a cemented   femoral stem.  No evidence of loosening or complications.  No   periprosthetic fractures.  Hip is reduced.    Comparative Data: Comparative data found and reviewed today.                   Assessment and Plan    Diagnoses and all orders for this visit:    1. Status post hemiarthroplasty of left hip (Primary)    2. Left hip pain  -     XR Hip With or Without Pelvis 2 - 3 View Tsering Rosa presents today 7 weeks postop left hip hemiarthroplasty for femoral neck fracture performed on 4/10/2025.  X-rays reviewed with the patient today.  Incision continues to heal appropriately.  Patient will continue with incision site hygiene.  We discussed incision site hygiene where patient is to keep her incision clean and  dry, apply gauze to the area to collect any excess moisture, avoid using any topical ointments or creams over the incision.  Patient to notify our office with any redness, drainage, or concerns about her incision.  Patient to continue with home physical therapy as well as her home exercises.      Patient will follow up in 3 weeks for incision check.      Call or return if symptoms worsen or patient has any concerns.       Follow Up   Return in about 3 weeks (around 6/18/2025).  Patient was given instructions and counseling regarding her condition or for health maintenance advice. Please see specific information pulled into the AVS if appropriate.     Krista Hernandez PA-C  05/28/25  14:09 EDT

## 2025-06-18 ENCOUNTER — OFFICE VISIT (OUTPATIENT)
Dept: ORTHOPEDIC SURGERY | Facility: CLINIC | Age: 80
End: 2025-06-18
Payer: MEDICARE

## 2025-06-18 VITALS
DIASTOLIC BLOOD PRESSURE: 81 MMHG | SYSTOLIC BLOOD PRESSURE: 138 MMHG | WEIGHT: 244 LBS | HEART RATE: 73 BPM | OXYGEN SATURATION: 95 % | HEIGHT: 65 IN | BODY MASS INDEX: 40.65 KG/M2

## 2025-06-18 DIAGNOSIS — Z96.642 STATUS POST HEMIARTHROPLASTY OF LEFT HIP: Primary | ICD-10-CM

## 2025-06-18 NOTE — PROGRESS NOTES
"Chief Complaint  Follow-up of the Left Hip    Subjective          Cindy Rosa presents to [facility] for a follow up for her left hip.     History of Present Illness    The patient presents here today for a follow up for her left hip. She underwent a left hip hemiarthroplasty performed on 04/10/25. She is overall doing well and denies any pain to her left hip. She is not using any assistance devices. She has been doing dressing changes to her incision and states her incision is doing well.     No Known Allergies     Social History     Socioeconomic History    Marital status:    Tobacco Use    Smoking status: Former     Current packs/day: 0.00     Average packs/day: 0.5 packs/day for 8.0 years (4.0 ttl pk-yrs)     Types: Cigarettes     Quit date:      Years since quittin.4     Passive exposure: Past    Smokeless tobacco: Never    Tobacco comments:     Stopped    Vaping Use    Vaping status: Never Used   Substance and Sexual Activity    Alcohol use: Yes     Alcohol/week: 2.0 standard drinks of alcohol     Types: 1 Glasses of wine, 1 Drinks containing 0.5 oz of alcohol per week     Comment: Occasional wine or coctail not weekly    Drug use: No    Sexual activity: Not Currently     Partners: Male     Comment: N/A        I reviewed the patient's chief complaint, history of present illness, review of systems, past medical history, surgical history, family history, social history, medications, and allergy list.     REVIEW OF SYSTEMS    Constitutional: Denies fevers, chills, weight loss  Cardiovascular: Denies chest pain, shortness of breath  Skin: Denies rashes, acute skin changes  Neurologic: Denies headache, loss of consciousness  MSK: left hip pain       Objective   Vital Signs:   /81   Pulse 73   Ht 165.1 cm (65\")   Wt 111 kg (244 lb)   SpO2 95%   BMI 40.60 kg/m²     Body mass index is 40.6 kg/m².    Physical Exam    General: Alert. No acute distress. "   Left lower extremity: Healing surgicial incision, small scabs over the incision, no active drainage, no signs of infection, no pain with him range of motion, 5/5 hip flexion  and abduction, distal neurovascularly intact, calf soft, positive  pulses, positive EHL, FHL, GS, and TA. Sensation intact to all 5 nerves of the foot.      Procedures    Imaging Results (Most Recent)       None                     Assessment and Plan        XR Hip With or Without Pelvis 2 - 3 View Left  Result Date: 5/28/2025  Narrative: Indications: Follow-up left hip hemiarthroplasty Views: AP and frog lateral left hip Findings: Left hip hemiarthroplasty implants in place with a cemented femoral stem.  No evidence of loosening or complications.  No periprosthetic fractures.  Hip is reduced. Comparative Data: Comparative data found and reviewed today.       Diagnoses and all orders for this visit:    1. Status post hemiarthroplasty of left hip (Primary)        The patient presents here today for a follow up for her left hip.     She is overall doing well. She will continue current medications and home exercises given today. She will call with any increase in redness or drainage to her incision.       Will obtain X-Rays of left hip  at next visit.     Call or return if worsening symptoms.    Scribed for Tree Reese MD by Makeda Baker  06/18/2025   14:33 EDT         Follow Up       4 weeks     Patient was given instructions and counseling regarding her condition or for health maintenance advice. Please see specific information pulled into the AVS if appropriate.     I have personally performed the services described in this document as scribed by the above individual and it is both accurate and complete. Tree Reese MD 06/18/25 15:32 EDT

## 2025-07-16 ENCOUNTER — OFFICE VISIT (OUTPATIENT)
Dept: ORTHOPEDIC SURGERY | Facility: CLINIC | Age: 80
End: 2025-07-16
Payer: MEDICARE

## 2025-07-16 VITALS — BODY MASS INDEX: 40.65 KG/M2 | WEIGHT: 244 LBS | HEIGHT: 65 IN

## 2025-07-16 DIAGNOSIS — M25.552 LEFT HIP PAIN: Primary | ICD-10-CM

## 2025-07-16 DIAGNOSIS — Z96.642 STATUS POST HEMIARTHROPLASTY OF LEFT HIP: ICD-10-CM

## 2025-07-16 NOTE — PROGRESS NOTES
"Chief Complaint  Follow-up of the Left Hip    Subjective          Cindy Rosa presents to [facility] for a follow up for her left hip.     History of Present Illness    The patient presents here today for a follow up for her left hip. She underwent a left hip hemiarthroplasty performed on 04/10/25. She presents to the office today for her three months post-operative appointment. She is overall doing well and denies any new injury or falls. She has completed outpatient physical therapy but has been doing home exercises. She is ambulating today with a cane.     No Known Allergies     Social History     Socioeconomic History    Marital status:    Tobacco Use    Smoking status: Former     Current packs/day: 0.00     Average packs/day: 0.5 packs/day for 8.0 years (4.0 ttl pk-yrs)     Types: Cigarettes     Quit date:      Years since quittin.5     Passive exposure: Past    Smokeless tobacco: Never    Tobacco comments:     Stopped    Vaping Use    Vaping status: Never Used   Substance and Sexual Activity    Alcohol use: Yes     Alcohol/week: 2.0 standard drinks of alcohol     Types: 1 Glasses of wine, 1 Drinks containing 0.5 oz of alcohol per week     Comment: Occasional wine or coctail not weekly    Drug use: No    Sexual activity: Not Currently     Partners: Male     Comment: N/A        I reviewed the patient's chief complaint, history of present illness, review of systems, past medical history, surgical history, family history, social history, medications, and allergy list.     REVIEW OF SYSTEMS    Constitutional: Denies fevers, chills, weight loss  Cardiovascular: Denies chest pain, shortness of breath  Skin: Denies rashes, acute skin changes  Neurologic: Denies headache, loss of consciousness  MSK: left hip pain       Objective   Vital Signs:   Ht 165.1 cm (65\")   Wt 111 kg (244 lb)   BMI 40.60 kg/m²     Body mass index is 40.6 kg/m².    Physical Exam    General: " Alert. No acute distress.   Left lower extremity: well healed surgicial incision, hip flexion  to 90 degrees, smooth hip range of motion, no pain with him range of motion, 5/5 hip flexion and abduction, distal neurovascularly intact, calf soft, positive pulses, positive EHL, FHL, GS, and TA. Sensation intact to all 5 nerves of the foot.     Procedures    Imaging Results (Most Recent)       Procedure Component Value Units Date/Time    XR Hip With or Without Pelvis 2 - 3 View Left - In process [[id number]] Resulted: 07/16/25 1438     Updated: 07/16/25 1448    This result has not been signed. Information might be incomplete.                       Assessment and Plan        No results found.     Diagnoses and all orders for this visit:    1. Left hip pain (Primary)  -     XR Hip With or Without Pelvis 2 - 3 View Left    2. Status post hemiarthroplasty of left hip      The patient presents here today for a follow up for her left hip. X-rays were obtained in the office today and these were reviewed today.     She is overall doing well and will continue over the counter medications for pain control.     Temporary handicap placard given to the patient today.       Will obtain X-Rays of left hip at next visit.     Call or return if worsening symptoms.    Scribed for Tree Reese MD by Makeda Baker  07/16/2025   14:51 EDT         Follow Up       3 months     Patient was given instructions and counseling regarding her condition or for health maintenance advice. Please see specific information pulled into the AVS if appropriate.

## 2025-07-31 ENCOUNTER — TELEPHONE (OUTPATIENT)
Dept: ONCOLOGY | Facility: HOSPITAL | Age: 80
End: 2025-07-31
Payer: MEDICARE

## 2025-07-31 ENCOUNTER — TRANSCRIBE ORDERS (OUTPATIENT)
Dept: ADMINISTRATIVE | Facility: HOSPITAL | Age: 80
End: 2025-07-31
Payer: MEDICARE

## 2025-07-31 DIAGNOSIS — Z12.31 BREAST CANCER SCREENING BY MAMMOGRAM: Primary | ICD-10-CM

## 2025-07-31 NOTE — TELEPHONE ENCOUNTER
Caller: Cindy Rosa    Relationship: Self    Best call back number:   Telephone Information:   Mobile [phone]     What is the best time to reach you: ANYTIME     What was the call regarding: PT IS NEEDING TO R/S HER 10/7 F/U TO AFTER 10/28. PLEASE CB TO ADVISE.

## (undated) DEVICE — WEREWOLF FASTSEAL 6.0 HEMOSTASIS WAND: Brand: FASTSEAL 6.0 HEMOSTASIS WAND

## (undated) DEVICE — PENCL SMOKE/EVAC MEGADYNE TELESCP 10FT

## (undated) DEVICE — GAUZE,SPONGE,4"X4",16PLY,STRL,LF,10/TRAY: Brand: MEDLINE

## (undated) DEVICE — ENCORE® LATEX ORTHO SIZE 8, STERILE LATEX POWDER-FREE SURGICAL GLOVE: Brand: ENCORE

## (undated) DEVICE — BNDG ELAS ELITE V/CLOSE 6IN 5YD LF STRL

## (undated) DEVICE — T-DRAPE,EXTREMITY,STERILE: Brand: MEDLINE

## (undated) DEVICE — DRP SURG U/DRP INVISISHIELD PA 48X52IN

## (undated) DEVICE — TRAY,IRRIGATION,BULBSYRINGE,60ML,CSR,PVP: Brand: MEDLINE

## (undated) DEVICE — UNDERCAST PADDING: Brand: DEROYAL

## (undated) DEVICE — THE STERILE LIGHT HANDLE COVER IS USED WITH STERIS SURGICAL LIGHTING AND VISUALIZATION SYSTEMS.

## (undated) DEVICE — MAT FLR ABS W/BLU/LINER 56X72IN WHT

## (undated) DEVICE — BNDG ELAS ELITE V/CLOSE 4IN 5YD LF STRL

## (undated) DEVICE — SPNG GZ WOVN 4X4IN 12PLY 10/BX STRL

## (undated) DEVICE — IMPLANTABLE DEVICE
Type: IMPLANTABLE DEVICE | Status: NON-FUNCTIONAL
Removed: 2019-04-08

## (undated) DEVICE — SUT ETHLN 3/0 PSL BLK MONO SA 30IN 1691H

## (undated) DEVICE — PREVENA PEEL & PLACE SYSTEM KIT- 13 CM: Brand: PREVENA™ PEEL & PLACE™

## (undated) DEVICE — DRAPE,U/ SHT,SPLIT,PLAS,STERIL: Brand: MEDLINE

## (undated) DEVICE — GOWN,PREVENTION PLUS,XLONG/XLARGE,STRL: Brand: MEDLINE

## (undated) DEVICE — PAD,ABDOMINAL,8"X10",ST,LF: Brand: MEDLINE

## (undated) DEVICE — SOL IRR NACL 0.9PCT BO 1000ML

## (undated) DEVICE — TOWEL,OR,DSP,ST,BLUE,STD,4/PK,20PK/CS: Brand: MEDLINE

## (undated) DEVICE — SOL IRR NACL 0.9PCT 3000ML

## (undated) DEVICE — SLV SCD KN/LEN ADJ EXPRSS BLENDED MD 1P/U

## (undated) DEVICE — GLV SURG SENSICARE PI ORTHO SZ8 LF STRL

## (undated) DEVICE — INTENDED FOR TISSUE SEPARATION, AND OTHER PROCEDURES THAT REQUIRE A SHARP SURGICAL BLADE TO PUNCTURE OR CUT.: Brand: BARD-PARKER ® DISPOSABLE SCALPELS

## (undated) DEVICE — STRYKER PERFORMANCE SERIES SAGITTAL BLADE: Brand: STRYKER PERFORMANCE SERIES

## (undated) DEVICE — SUT VIC 2/0 CT2 27IN J269H

## (undated) DEVICE — BNDG COBAN S/ADHR WRP LF 4IN 5YD TN

## (undated) DEVICE — UNDYED BRAIDED (POLYGLACTIN 910), SYNTHETIC ABSORBABLE SUTURE: Brand: COATED VICRYL

## (undated) DEVICE — KWIRE TROC/TP 2X150MM NS
Type: IMPLANTABLE DEVICE | Status: NON-FUNCTIONAL
Removed: 2019-04-08

## (undated) DEVICE — DECANTER: Brand: UNBRANDED

## (undated) DEVICE — KT PT POSITION SUPINE HANA/PROFX TABL

## (undated) DEVICE — SOL ISO/ALC RUB 70PCT 4OZ

## (undated) DEVICE — TOTAL ANTERIOR HIP-LF: Brand: MEDLINE INDUSTRIES, INC.

## (undated) DEVICE — SCRW LK S/TAP STRDRV 3.5X38MM
Type: IMPLANTABLE DEVICE | Status: NON-FUNCTIONAL
Removed: 2019-04-08

## (undated) DEVICE — GLV SURG TRIUMPH CLASSIC PF LTX 8 STRL

## (undated) DEVICE — GLV SURG BIOGEL LTX PF 8

## (undated) DEVICE — PAD GRND REM POLYHESIVE A/ DISP

## (undated) DEVICE — SOL NACL 0.9PCT 100ML SGL

## (undated) DEVICE — PK ORTHO MAJ 40

## (undated) DEVICE — SCRW LK S/TAP STRDRV 3.5X40MM
Type: IMPLANTABLE DEVICE | Status: NON-FUNCTIONAL
Removed: 2019-04-08

## (undated) DEVICE — OCCLUSIVE GAUZE STRIP,3% BISMUTH TRIBROMOPHENATE IN PETROLATUM BLEND: Brand: XEROFORM

## (undated) DEVICE — 450 ML BOTTLE OF 0.05% CHLORHEXIDINE GLUCONATE IN 99.95% STERILE WATER FOR IRRIGATION, USP AND APPLICATOR.: Brand: IRRISEPT ANTIMICROBIAL WOUND LAVAGE

## (undated) DEVICE — Device

## (undated) DEVICE — PCH INST SURG INVISISHIELD 2PCKT

## (undated) DEVICE — BIT DRL QC DIA 2.5X110MM

## (undated) DEVICE — PULLOVER TOGA, 2X LARGE: Brand: FLYTE, SURGICOOL

## (undated) DEVICE — DISPOSABLE TOURNIQUET CUFF SINGLE BLADDER, SINGLE PORT AND QUICK CONNECT CONNECTOR: Brand: COLOR CUFF

## (undated) DEVICE — BIT DRL QC W DEPTH MARK 2X140MM

## (undated) DEVICE — ZIPPERED TOGA, PEEL-AWAY 3X LARGE: Brand: FLYTE, SURGICOOL

## (undated) DEVICE — SCRW LK S/TAP STRDRV 3.5X42MM
Type: IMPLANTABLE DEVICE | Status: NON-FUNCTIONAL
Removed: 2019-04-08

## (undated) DEVICE — ANTIBACTERIAL UNDYED BRAIDED (POLYGLACTIN 910), SYNTHETIC ABSORBABLE SUTURE: Brand: COATED VICRYL

## (undated) DEVICE — APPL CHLORAPREP HI/LITE 26ML ORNG

## (undated) DEVICE — CVR LEG BOOTLEG F/R NOSKID 33IN

## (undated) DEVICE — ELECTRD BLD EZ CLN STD 6.5IN

## (undated) DEVICE — KWIRE TROC/TP SS 1.25X150MM NS
Type: IMPLANTABLE DEVICE | Status: NON-FUNCTIONAL
Removed: 2019-04-08

## (undated) DEVICE — SYR LUERLOK 30CC

## (undated) DEVICE — DRSNG GZ PETROLTM XEROFORM CURAD 1X8IN STRL

## (undated) DEVICE — SUT VIC 0 CT1 36IN J946H

## (undated) DEVICE — DRP C/ARMOR

## (undated) DEVICE — APPL CHLORAPREP W/TINT 26ML ORNG

## (undated) DEVICE — BIT DRL QC DIA 2.5X180MM

## (undated) DEVICE — PROXIMATE RH ROTATING HEAD SKIN STAPLERS (35 WIDE) CONTAINS 35 STAINLESS STEEL STAPLES: Brand: PROXIMATE

## (undated) DEVICE — BNDG ESMARK 4IN 12FT LF STRL BLU

## (undated) DEVICE — SOL IRR SURG XPERIENCE NORNS WND/LAVG 500ML 4PK

## (undated) DEVICE — SUT VIC UD BR COAT 0 CP2 27IN

## (undated) DEVICE — DRP C/ARM 41X74IN

## (undated) DEVICE — GLOVE,SURG,SENSICARE SLT,LF,PF,8: Brand: MEDLINE

## (undated) DEVICE — SOL IRR H2O BO 1000ML STRL

## (undated) DEVICE — DPR SLUSH MACH FOR STND ALONE